# Patient Record
Sex: FEMALE | Race: WHITE | NOT HISPANIC OR LATINO | Employment: OTHER | RURAL
[De-identification: names, ages, dates, MRNs, and addresses within clinical notes are randomized per-mention and may not be internally consistent; named-entity substitution may affect disease eponyms.]

---

## 2017-10-04 LAB
CHOLEST SERPL-MSCNC: 252 MG/DL (ref 0–200)
HDLC SERPL-MCNC: 38 MG/DL (ref 35–70)
LDLC SERPL CALC-MCNC: ABNORMAL MG/DL
TRIGL SERPL-MCNC: 779 MG/DL (ref 40–160)

## 2018-02-20 LAB — % HEMOGLOBIN A1C: 8

## 2020-04-21 ENCOUNTER — HISTORICAL (OUTPATIENT)
Dept: ADMINISTRATIVE | Facility: HOSPITAL | Age: 65
End: 2020-04-21

## 2020-04-21 LAB
BASOPHILS # BLD AUTO: 0.05 X10E3/UL (ref 0–0.2)
BASOPHILS NFR BLD AUTO: 0.6 % (ref 0–1)
BILIRUB UR QL STRIP: NEGATIVE MG/DL
BUN SERPL-MCNC: 25 MG/DL (ref 7–18)
CALCIUM SERPL-MCNC: 9.3 MG/DL (ref 8.5–10.1)
CHLORIDE SERPL-SCNC: 102 MMOL/L (ref 98–107)
CLARITY UR: CLEAR
CO2 SERPL-SCNC: 30 MMOL/L (ref 21–32)
COLOR UR: COLORLESS
CREAT SERPL-MCNC: 1.72 MG/DL (ref 0.55–1.02)
EOSINOPHIL # BLD AUTO: 0.25 X10E3/UL (ref 0–0.5)
EOSINOPHIL NFR BLD AUTO: 2.8 % (ref 1–4)
ERYTHROCYTE [DISTWIDTH] IN BLOOD BY AUTOMATED COUNT: 15.5 % (ref 11.5–14.5)
GLUCOSE SERPL-MCNC: 147 MG/DL (ref 74–106)
GLUCOSE UR STRIP-MCNC: NEGATIVE MG/DL
HCT VFR BLD AUTO: 44.5 % (ref 38–47)
HGB BLD-MCNC: 14.2 G/DL (ref 12–16)
IMM GRANULOCYTES # BLD AUTO: 0.07 X10E3/UL (ref 0–0.04)
IMM GRANULOCYTES NFR BLD: 0.8 % (ref 0–0.4)
KETONES UR STRIP-SCNC: NEGATIVE MG/DL
LEUKOCYTE ESTERASE UR QL STRIP: NEGATIVE LEU/UL
LYMPHOCYTES # BLD AUTO: 1.59 X10E3/UL (ref 1–4.8)
LYMPHOCYTES NFR BLD AUTO: 17.6 % (ref 27–41)
MCH RBC QN AUTO: 29.9 PG (ref 27–31)
MCHC RBC AUTO-ENTMCNC: 31.9 G/DL (ref 32–36)
MCV RBC AUTO: 93.7 FL (ref 80–96)
MONOCYTES # BLD AUTO: 0.72 X10E3/UL (ref 0–0.8)
MONOCYTES NFR BLD AUTO: 8 % (ref 2–6)
MPC BLD CALC-MCNC: 10.1 FL (ref 9.4–12.4)
NEUTROPHILS # BLD AUTO: 6.36 X10E3/UL (ref 1.8–7.7)
NEUTROPHILS NFR BLD AUTO: 70.2 % (ref 53–65)
NITRITE UR QL STRIP: NEGATIVE
NRBC # BLD AUTO: 0 X10E3/UL (ref 0–0)
NRBC, AUTO (.00): 0 /100 (ref 0–0)
PH UR STRIP: 5.5 PH UNITS (ref 5–8)
PLATELET # BLD AUTO: 238 X10E3/UL (ref 150–400)
POTASSIUM SERPL-SCNC: 4 MMOL/L (ref 3.5–5.1)
PROLACTIN SERPL-MCNC: 12.5 NG/ML
PROT UR QL STRIP: NEGATIVE MG/DL
RBC # BLD AUTO: 4.75 X10E6/UL (ref 4.2–5.4)
RBC # UR STRIP: NEGATIVE ERY/UL
SODIUM SERPL-SCNC: 139 MMOL/L (ref 136–145)
SP GR UR STRIP: 1.01 (ref 1–1.03)
UROBILINOGEN UR STRIP-ACNC: 0.2 EU/DL
WBC # BLD AUTO: 9.04 X10E3/UL (ref 4.5–11)

## 2020-11-27 ENCOUNTER — HISTORICAL (OUTPATIENT)
Dept: ADMINISTRATIVE | Facility: HOSPITAL | Age: 65
End: 2020-11-27

## 2020-11-27 LAB
ALBUMIN SERPL BCP-MCNC: 3.4 G/DL (ref 3.4–5)
ALBUMIN/GLOB SERPL: 1 {RATIO}
ALP SERPL-CCNC: 72 U/L (ref 46–116)
ALT SERPL W P-5'-P-CCNC: 22 U/L (ref 14–63)
ANION GAP SERPL CALCULATED.3IONS-SCNC: 15 MMOL/L
ANISOCYTOSIS BLD QL SMEAR: ABNORMAL
APTT PPP: 36.4 SECONDS (ref 25.2–37.3)
AST SERPL W P-5'-P-CCNC: 15 U/L (ref 15–37)
BASOPHILS # BLD AUTO: 0.03 X10E3/UL (ref 0–0.2)
BASOPHILS NFR BLD AUTO: 0.2 % (ref 0–1)
BILIRUB SERPL-MCNC: 0.3 MG/DL (ref 0.2–1)
BUN SERPL-MCNC: 31 MG/DL (ref 7–17)
BUN/CREAT SERPL: 16.5
CALCIUM SERPL-MCNC: 9.5 MG/DL (ref 8.5–10.1)
CHLORIDE SERPL-SCNC: 101 MMOL/L (ref 98–107)
CO2 SERPL-SCNC: 25 MMOL/L (ref 21–32)
CREAT SERPL-MCNC: 1.88 MG/DL (ref 0.55–1.3)
D DIMER PPP FEU-MCNC: 0.33 UG/ML (ref 0–0.47)
EOSINOPHIL # BLD AUTO: 0.18 X10E3/UL (ref 0–0.5)
EOSINOPHIL NFR BLD AUTO: 1 % (ref 1–4)
EOSINOPHIL NFR BLD MANUAL: 2 % (ref 1–4)
ERYTHROCYTE [DISTWIDTH] IN BLOOD BY AUTOMATED COUNT: 16.2 % (ref 11.5–14.5)
FLUAV AG UPPER RESP QL IA.RAPID: NEGATIVE
FLUBV AG UPPER RESP QL IA.RAPID: NEGATIVE
GLOBULIN SER-MCNC: 3.5 G/DL
GLUCOSE SERPL-MCNC: 279 MG/DL (ref 74–106)
HCT VFR BLD AUTO: 42.9 % (ref 38–47)
HGB BLD-MCNC: 14.4 G/DL (ref 12–16)
INR BLD: 1 (ref 0–3.4)
LACTATE SERPL-SCNC: 2.89 MMOL/L (ref 0.4–2)
LYMPHOCYTES # BLD AUTO: 1.51 X10E3/UL (ref 1–4.8)
LYMPHOCYTES NFR BLD AUTO: 8.5 % (ref 27–41)
LYMPHOCYTES NFR BLD MANUAL: 9 % (ref 27–41)
MAGNESIUM SERPL-MCNC: 1.8 MG/DL (ref 1.8–2.4)
MCH RBC QN AUTO: 29.8 PG (ref 27–31)
MCHC RBC AUTO-ENTMCNC: 33.6 G/DL (ref 32–36)
MCV RBC AUTO: 89 FL (ref 80–96)
MONOCYTES # BLD AUTO: 1.12 X10E3/UL (ref 0–0.8)
MONOCYTES NFR BLD AUTO: 6.3 % (ref 2–6)
MONOCYTES NFR BLD MANUAL: 6 % (ref 2–6)
MPC BLD CALC-MCNC: 10.6 FL (ref 9.4–12.4)
NEUTROPHILS # BLD AUTO: 14.89 X10E3/UL (ref 1.8–7.7)
NEUTROPHILS NFR BLD AUTO: 84 % (ref 53–65)
NEUTS BAND NFR BLD MANUAL: 9 % (ref 1–5)
NEUTS SEG NFR BLD MANUAL: 74 % (ref 50–62)
NT-PROBNP SERPL-MCNC: 130 PG/ML (ref 5–125)
PLATELET # BLD AUTO: 193 X10E3/UL (ref 150–400)
PLATELET MORPHOLOGY: NORMAL
POTASSIUM SERPL-SCNC: 5.2 MMOL/L (ref 3.5–5.1)
PROT SERPL-MCNC: 6.9 G/DL (ref 6.4–8.2)
PROTHROMBIN TIME: 13.2 SECONDS (ref 11.7–14.7)
RBC # BLD AUTO: 4.83 X10E6/UL (ref 4.2–5.4)
RBC MORPH BLD: NORMAL
SARS-COV+SARS-COV-2 AG RESP QL IA.RAPID: NEGATIVE
SODIUM SERPL-SCNC: 136 MMOL/L (ref 136–145)
TROPONIN I SERPL-MCNC: <0.017 NG/ML (ref 0–0.06)
WBC # BLD AUTO: 17.73 X10E3/UL (ref 4.5–11)

## 2020-11-29 ENCOUNTER — HISTORICAL (OUTPATIENT)
Dept: ADMINISTRATIVE | Facility: HOSPITAL | Age: 65
End: 2020-11-29

## 2020-11-29 LAB
ANION GAP SERPL CALCULATED.3IONS-SCNC: 11 MMOL/L
BASOPHILS # BLD AUTO: 0.03 X10E3/UL (ref 0–0.2)
BASOPHILS NFR BLD AUTO: 0.3 % (ref 0–1)
BUN SERPL-MCNC: 29 MG/DL (ref 7–18)
CALCIUM SERPL-MCNC: 9.3 MG/DL (ref 8.5–10.1)
CHLORIDE SERPL-SCNC: 103 MMOL/L (ref 98–107)
CO2 SERPL-SCNC: 28 MMOL/L (ref 21–32)
CREAT SERPL-MCNC: 1.84 MG/DL (ref 0.55–1.02)
EOSINOPHIL # BLD AUTO: 0.31 X10E3/UL (ref 0–0.5)
EOSINOPHIL NFR BLD AUTO: 3.6 % (ref 1–4)
EOSINOPHIL NFR BLD MANUAL: 1 % (ref 1–4)
ERYTHROCYTE [DISTWIDTH] IN BLOOD BY AUTOMATED COUNT: 16.2 % (ref 11.5–14.5)
GLUCOSE SERPL-MCNC: 248 MG/DL (ref 74–106)
HCT VFR BLD AUTO: 39.5 % (ref 38–47)
HGB BLD-MCNC: 12.7 G/DL (ref 12–16)
LYMPHOCYTES # BLD AUTO: 2.18 X10E3/UL (ref 1–4.8)
LYMPHOCYTES NFR BLD AUTO: 25.4 % (ref 27–41)
LYMPHOCYTES NFR BLD MANUAL: 28 % (ref 27–41)
MCH RBC QN AUTO: 30.5 PG (ref 27–31)
MCHC RBC AUTO-ENTMCNC: 32.2 G/DL (ref 32–36)
MCV RBC AUTO: 95 FL (ref 80–96)
MONOCYTES # BLD AUTO: 0.7 X10E3/UL (ref 0–0.8)
MONOCYTES NFR BLD AUTO: 8.1 % (ref 2–6)
MONOCYTES NFR BLD MANUAL: 6 % (ref 2–6)
MPC BLD CALC-MCNC: 10.9 FL (ref 9.4–12.4)
NEUTROPHILS # BLD AUTO: 5.37 X10E3/UL (ref 1.8–7.7)
NEUTROPHILS NFR BLD AUTO: 62.6 % (ref 53–65)
NEUTS SEG NFR BLD MANUAL: 65 % (ref 50–62)
NT-PROBNP SERPL-MCNC: 254 PG/ML (ref 0–125)
PLATELET # BLD AUTO: 186 X10E3/UL (ref 150–400)
POTASSIUM SERPL-SCNC: 4.5 MMOL/L (ref 3.5–5.1)
RBC # BLD AUTO: 4.17 X10E6/UL (ref 4.2–5.4)
SODIUM SERPL-SCNC: 137 MMOL/L (ref 136–145)
WBC # BLD AUTO: 8.59 X10E3/UL (ref 4.5–11)

## 2020-11-30 ENCOUNTER — HISTORICAL (OUTPATIENT)
Dept: ADMINISTRATIVE | Facility: HOSPITAL | Age: 65
End: 2020-11-30

## 2020-11-30 LAB
ANION GAP SERPL CALCULATED.3IONS-SCNC: 12 MMOL/L
BUN SERPL-MCNC: 26 MG/DL (ref 7–18)
CALCIUM SERPL-MCNC: 9.4 MG/DL (ref 8.5–10.1)
CHLORIDE SERPL-SCNC: 103 MMOL/L (ref 98–107)
CO2 SERPL-SCNC: 27 MMOL/L (ref 21–32)
CREAT SERPL-MCNC: 1.58 MG/DL (ref 0.55–1.02)
GLUCOSE SERPL-MCNC: 189 MG/DL (ref 74–106)
POTASSIUM SERPL-SCNC: 4.8 MMOL/L (ref 3.5–5.1)
SARS-COV+SARS-COV-2 AG RESP QL IA.RAPID: NEGATIVE
SODIUM SERPL-SCNC: 137 MMOL/L (ref 136–145)

## 2020-12-01 ENCOUNTER — HISTORICAL (OUTPATIENT)
Dept: ADMINISTRATIVE | Facility: HOSPITAL | Age: 65
End: 2020-12-01

## 2020-12-01 LAB — GLUCOSE SERPL-MCNC: 222 MG/DL (ref 70–105)

## 2020-12-02 ENCOUNTER — HISTORICAL (OUTPATIENT)
Dept: ADMINISTRATIVE | Facility: HOSPITAL | Age: 65
End: 2020-12-02

## 2020-12-02 LAB
GLUCOSE SERPL-MCNC: 148 MG/DL (ref 70–105)
GLUCOSE SERPL-MCNC: 248 MG/DL (ref 70–105)
GLUCOSE SERPL-MCNC: 258 MG/DL (ref 70–105)

## 2020-12-03 LAB
REPORT: NORMAL

## 2021-01-19 ENCOUNTER — HISTORICAL (OUTPATIENT)
Dept: ADMINISTRATIVE | Facility: HOSPITAL | Age: 66
End: 2021-01-19

## 2021-01-19 LAB
25(OH)D3 SERPL-MCNC: 54.8 NG/ML
IRON SATN MFR SERPL: 15 % (ref 14–50)
IRON SERPL-MCNC: 53 UG/DL (ref 50–170)
TIBC SERPL-MCNC: 363 UG/DL (ref 250–450)

## 2021-05-27 ENCOUNTER — OFFICE VISIT (OUTPATIENT)
Dept: FAMILY MEDICINE | Facility: CLINIC | Age: 66
End: 2021-05-27
Payer: MEDICARE

## 2021-05-27 VITALS
BODY MASS INDEX: 34.96 KG/M2 | WEIGHT: 190 LBS | HEART RATE: 77 BPM | SYSTOLIC BLOOD PRESSURE: 116 MMHG | DIASTOLIC BLOOD PRESSURE: 70 MMHG | HEIGHT: 62 IN

## 2021-05-27 DIAGNOSIS — E11.9 TYPE 2 DIABETES MELLITUS WITHOUT COMPLICATION, WITHOUT LONG-TERM CURRENT USE OF INSULIN: Primary | ICD-10-CM

## 2021-05-27 DIAGNOSIS — G57.93 NEUROPATHY OF BOTH FEET: ICD-10-CM

## 2021-05-27 DIAGNOSIS — L84 CORNS AND CALLUS: ICD-10-CM

## 2021-05-27 DIAGNOSIS — E11.9 ENCOUNTER FOR DIABETIC FOOT EXAM: ICD-10-CM

## 2021-05-27 PROCEDURE — 99213 PR OFFICE/OUTPT VISIT, EST, LEVL III, 20-29 MIN: ICD-10-PCS | Mod: ,,, | Performed by: FAMILY MEDICINE

## 2021-05-27 PROCEDURE — 99213 OFFICE O/P EST LOW 20 MIN: CPT | Mod: ,,, | Performed by: FAMILY MEDICINE

## 2021-05-27 RX ORDER — PRAVASTATIN SODIUM 40 MG/1
40 TABLET ORAL NIGHTLY
COMMUNITY
Start: 2021-03-03 | End: 2021-07-01 | Stop reason: SDUPTHER

## 2021-05-27 RX ORDER — GLIPIZIDE 5 MG/1
5 TABLET ORAL DAILY
COMMUNITY
Start: 2021-03-27 | End: 2021-06-23 | Stop reason: SDUPTHER

## 2021-05-27 RX ORDER — ERGOCALCIFEROL 1.25 MG/1
5000 CAPSULE ORAL DAILY
COMMUNITY
Start: 2021-04-16

## 2021-05-27 RX ORDER — LISINOPRIL 20 MG/1
20 TABLET ORAL DAILY
COMMUNITY
Start: 2021-05-02 | End: 2021-07-01 | Stop reason: SDUPTHER

## 2021-05-27 RX ORDER — VALPROIC ACID 250 MG/1
250 CAPSULE, LIQUID FILLED ORAL 2 TIMES DAILY
COMMUNITY
Start: 2021-05-07 | End: 2021-07-01 | Stop reason: SDUPTHER

## 2021-05-27 RX ORDER — ESTRADIOL 1 MG/1
1 TABLET ORAL DAILY
COMMUNITY
Start: 2021-05-02 | End: 2021-07-01 | Stop reason: SDUPTHER

## 2021-05-27 RX ORDER — GABAPENTIN 100 MG/1
100 CAPSULE ORAL 2 TIMES DAILY
COMMUNITY
Start: 2021-05-07 | End: 2021-07-01 | Stop reason: SDUPTHER

## 2021-05-27 RX ORDER — LEVOTHYROXINE SODIUM 25 UG/1
25 TABLET ORAL DAILY
COMMUNITY
Start: 2021-05-02 | End: 2021-07-01 | Stop reason: SDUPTHER

## 2021-05-27 RX ORDER — PANTOPRAZOLE SODIUM 40 MG/1
40 TABLET, DELAYED RELEASE ORAL DAILY
COMMUNITY
Start: 2021-03-03 | End: 2021-07-01 | Stop reason: SDUPTHER

## 2021-06-24 RX ORDER — GLIPIZIDE 5 MG/1
5 TABLET ORAL DAILY
Qty: 90 TABLET | Refills: 1 | Status: SHIPPED | OUTPATIENT
Start: 2021-06-24 | End: 2021-10-01 | Stop reason: SDUPTHER

## 2021-07-01 ENCOUNTER — OFFICE VISIT (OUTPATIENT)
Dept: FAMILY MEDICINE | Facility: CLINIC | Age: 66
End: 2021-07-01
Payer: COMMERCIAL

## 2021-07-01 VITALS
OXYGEN SATURATION: 97 % | BODY MASS INDEX: 36.07 KG/M2 | HEIGHT: 62 IN | WEIGHT: 196 LBS | RESPIRATION RATE: 16 BRPM | DIASTOLIC BLOOD PRESSURE: 80 MMHG | SYSTOLIC BLOOD PRESSURE: 132 MMHG | HEART RATE: 64 BPM

## 2021-07-01 DIAGNOSIS — Z12.31 ENCOUNTER FOR SCREENING MAMMOGRAM FOR MALIGNANT NEOPLASM OF BREAST: ICD-10-CM

## 2021-07-01 DIAGNOSIS — I10 ESSENTIAL HYPERTENSION: ICD-10-CM

## 2021-07-01 DIAGNOSIS — E11.40 TYPE 2 DIABETES MELLITUS WITH DIABETIC NEUROPATHY, WITHOUT LONG-TERM CURRENT USE OF INSULIN: Primary | ICD-10-CM

## 2021-07-01 DIAGNOSIS — E89.40 ASYMPTOMATIC POSTSURGICAL MENOPAUSE: ICD-10-CM

## 2021-07-01 PROCEDURE — 1036F PR CURRENT TOBACCO NON-USER: ICD-10-PCS | Mod: ,,, | Performed by: FAMILY MEDICINE

## 2021-07-01 PROCEDURE — G0439 PR MEDICARE ANNUAL WELLNESS SUBSEQUENT VISIT: ICD-10-PCS | Mod: ,,, | Performed by: FAMILY MEDICINE

## 2021-07-01 PROCEDURE — 1036F TOBACCO NON-USER: CPT | Mod: ,,, | Performed by: FAMILY MEDICINE

## 2021-07-01 PROCEDURE — G0444 DEPRESSION SCREEN ANNUAL: HCPCS | Mod: ,,, | Performed by: FAMILY MEDICINE

## 2021-07-01 PROCEDURE — G0444 PR DEPRESSION SCREENING: ICD-10-PCS | Mod: ,,, | Performed by: FAMILY MEDICINE

## 2021-07-01 PROCEDURE — G0439 PPPS, SUBSEQ VISIT: HCPCS | Mod: ,,, | Performed by: FAMILY MEDICINE

## 2021-07-01 RX ORDER — PRAVASTATIN SODIUM 40 MG/1
40 TABLET ORAL NIGHTLY
Qty: 90 TABLET | Refills: 2 | Status: SHIPPED | OUTPATIENT
Start: 2021-07-01 | End: 2021-12-01 | Stop reason: SDUPTHER

## 2021-07-01 RX ORDER — LISINOPRIL 20 MG/1
20 TABLET ORAL DAILY
Qty: 90 TABLET | Refills: 2 | Status: SHIPPED | OUTPATIENT
Start: 2021-07-01 | End: 2021-10-28 | Stop reason: SDUPTHER

## 2021-07-01 RX ORDER — PANTOPRAZOLE SODIUM 40 MG/1
40 TABLET, DELAYED RELEASE ORAL DAILY
Qty: 90 TABLET | Refills: 2 | Status: SHIPPED | OUTPATIENT
Start: 2021-07-01 | End: 2021-10-28 | Stop reason: SDUPTHER

## 2021-07-01 RX ORDER — GABAPENTIN 100 MG/1
100 CAPSULE ORAL 2 TIMES DAILY
Qty: 180 CAPSULE | Refills: 2 | Status: SHIPPED | OUTPATIENT
Start: 2021-07-01 | End: 2021-10-01 | Stop reason: SDUPTHER

## 2021-07-01 RX ORDER — VALPROIC ACID 250 MG/1
250 CAPSULE, LIQUID FILLED ORAL 2 TIMES DAILY
Qty: 180 CAPSULE | Refills: 2 | Status: SHIPPED | OUTPATIENT
Start: 2021-07-01 | End: 2021-09-15 | Stop reason: SDUPTHER

## 2021-07-01 RX ORDER — LEVOTHYROXINE SODIUM 25 UG/1
25 TABLET ORAL DAILY
Qty: 90 TABLET | Refills: 2 | Status: SHIPPED | OUTPATIENT
Start: 2021-07-01 | End: 2021-10-28 | Stop reason: SDUPTHER

## 2021-07-01 RX ORDER — ESTRADIOL 1 MG/1
1 TABLET ORAL DAILY
Qty: 90 TABLET | Refills: 2 | Status: SHIPPED | OUTPATIENT
Start: 2021-07-01 | End: 2021-10-28 | Stop reason: SDUPTHER

## 2021-07-12 ENCOUNTER — HOSPITAL ENCOUNTER (OUTPATIENT)
Dept: RADIOLOGY | Facility: HOSPITAL | Age: 66
Discharge: HOME | End: 2021-07-12
Attending: FAMILY MEDICINE
Payer: MEDICARE

## 2021-07-12 ENCOUNTER — HOSPITAL ENCOUNTER (OUTPATIENT)
Dept: RADIOLOGY | Facility: HOSPITAL | Age: 66
Discharge: HOME | End: 2021-07-12
Attending: FAMILY MEDICINE
Payer: COMMERCIAL

## 2021-07-12 VITALS — HEIGHT: 62 IN | BODY MASS INDEX: 36.07 KG/M2 | WEIGHT: 196 LBS

## 2021-07-12 DIAGNOSIS — Z12.31 BREAST CANCER SCREENING BY MAMMOGRAM: ICD-10-CM

## 2021-07-12 DIAGNOSIS — E89.40 ASYMPTOMATIC POSTSURGICAL MENOPAUSE: ICD-10-CM

## 2021-07-12 PROCEDURE — 77080 DXA BONE DENSITY AXIAL: CPT | Mod: 26,,, | Performed by: RADIOLOGY

## 2021-07-12 PROCEDURE — 77080 DEXA BONE DENSITY SPINE HIP: ICD-10-PCS | Mod: 26,,, | Performed by: RADIOLOGY

## 2021-07-12 PROCEDURE — 77067 SCR MAMMO BI INCL CAD: CPT | Mod: TC

## 2021-07-12 PROCEDURE — 77080 DXA BONE DENSITY AXIAL: CPT | Mod: TC

## 2021-07-15 ENCOUNTER — OFFICE VISIT (OUTPATIENT)
Dept: FAMILY MEDICINE | Facility: CLINIC | Age: 66
End: 2021-07-15
Payer: COMMERCIAL

## 2021-07-15 VITALS
HEIGHT: 62 IN | HEART RATE: 74 BPM | BODY MASS INDEX: 35.7 KG/M2 | SYSTOLIC BLOOD PRESSURE: 132 MMHG | WEIGHT: 194 LBS | DIASTOLIC BLOOD PRESSURE: 73 MMHG

## 2021-07-15 DIAGNOSIS — S30.861A TICK BITE OF ABDOMEN, INITIAL ENCOUNTER: ICD-10-CM

## 2021-07-15 DIAGNOSIS — M72.2 PLANTAR FASCIITIS OF RIGHT FOOT: Primary | ICD-10-CM

## 2021-07-15 DIAGNOSIS — M79.671 FOOT PAIN, RIGHT: ICD-10-CM
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
## 2021-07-15 PROCEDURE — 99213 OFFICE O/P EST LOW 20 MIN: CPT | Mod: ,,, | Performed by: FAMILY MEDICINE

## 2021-07-15 PROCEDURE — 99213 PR OFFICE/OUTPT VISIT, EST, LEVL III, 20-29 MIN: ICD-10-PCS | Mod: ,,, | Performed by: FAMILY MEDICINE

## 2021-07-15 RX ORDER — NAPROXEN 500 MG/1
500 TABLET ORAL 2 TIMES DAILY WITH MEALS
Qty: 14 TABLET | Refills: 0 | Status: SHIPPED | OUTPATIENT
Start: 2021-07-15 | End: 2021-12-01 | Stop reason: ALTCHOICE

## 2021-09-14 ENCOUNTER — PATIENT OUTREACH (OUTPATIENT)
Dept: FAMILY MEDICINE | Facility: CLINIC | Age: 66
End: 2021-09-14

## 2021-09-15 RX ORDER — VALPROIC ACID 250 MG/1
250 CAPSULE, LIQUID FILLED ORAL 2 TIMES DAILY
Qty: 180 CAPSULE | Refills: 2 | Status: SHIPPED | OUTPATIENT
Start: 2021-09-15 | End: 2022-09-08 | Stop reason: SDUPTHER

## 2021-09-23 ENCOUNTER — IMMUNIZATION (OUTPATIENT)
Dept: FAMILY MEDICINE | Facility: CLINIC | Age: 66
End: 2021-09-23
Payer: COMMERCIAL

## 2021-09-23 DIAGNOSIS — I10 ESSENTIAL HYPERTENSION: ICD-10-CM

## 2021-09-23 DIAGNOSIS — E11.40 TYPE 2 DIABETES MELLITUS WITH DIABETIC NEUROPATHY, WITHOUT LONG-TERM CURRENT USE OF INSULIN: Primary | ICD-10-CM

## 2021-09-23 DIAGNOSIS — Z79.899 LONG-TERM USE OF HIGH-RISK MEDICATION: ICD-10-CM

## 2021-09-23 DIAGNOSIS — Z23 NEED FOR VACCINATION: ICD-10-CM

## 2021-09-23 DIAGNOSIS — E55.9 VITAMIN D DEFICIENCY: ICD-10-CM

## 2021-09-23 DIAGNOSIS — Z13.220 SCREENING FOR LIPOID DISORDERS: ICD-10-CM

## 2021-09-23 PROCEDURE — 91301 COVID-19, MRNA, LNP-S, PF, 100 MCG/0.5 ML DOSE VACCINE: ICD-10-PCS | Mod: ,,, | Performed by: NURSE PRACTITIONER

## 2021-09-23 PROCEDURE — 0011A COVID-19, MRNA, LNP-S, PF, 100 MCG/0.5 ML DOSE VACCINE: CPT | Mod: CV19,,, | Performed by: NURSE PRACTITIONER

## 2021-09-23 PROCEDURE — 91301 COVID-19, MRNA, LNP-S, PF, 100 MCG/0.5 ML DOSE VACCINE: CPT | Mod: ,,, | Performed by: NURSE PRACTITIONER

## 2021-09-23 PROCEDURE — 0011A COVID-19, MRNA, LNP-S, PF, 100 MCG/0.5 ML DOSE VACCINE: ICD-10-PCS | Mod: CV19,,, | Performed by: NURSE PRACTITIONER

## 2021-10-28 RX ORDER — PANTOPRAZOLE SODIUM 40 MG/1
40 TABLET, DELAYED RELEASE ORAL DAILY
Qty: 90 TABLET | Refills: 2 | Status: SHIPPED | OUTPATIENT
Start: 2021-10-28 | End: 2022-07-21

## 2021-10-28 RX ORDER — LISINOPRIL 20 MG/1
20 TABLET ORAL DAILY
Qty: 90 TABLET | Refills: 2 | Status: SHIPPED | OUTPATIENT
Start: 2021-10-28 | End: 2022-07-21

## 2021-10-28 RX ORDER — LEVOTHYROXINE SODIUM 25 UG/1
25 TABLET ORAL DAILY
Qty: 90 TABLET | Refills: 2 | Status: SHIPPED | OUTPATIENT
Start: 2021-10-28 | End: 2022-07-21

## 2021-10-28 RX ORDER — ESTRADIOL 1 MG/1
1 TABLET ORAL DAILY
Qty: 90 TABLET | Refills: 2 | Status: SHIPPED | OUTPATIENT
Start: 2021-10-28 | End: 2022-07-21

## 2021-12-01 ENCOUNTER — OFFICE VISIT (OUTPATIENT)
Dept: FAMILY MEDICINE | Facility: CLINIC | Age: 66
End: 2021-12-01
Payer: MEDICARE

## 2021-12-01 VITALS
SYSTOLIC BLOOD PRESSURE: 133 MMHG | HEART RATE: 77 BPM | HEIGHT: 62 IN | BODY MASS INDEX: 35.7 KG/M2 | WEIGHT: 194 LBS | DIASTOLIC BLOOD PRESSURE: 76 MMHG

## 2021-12-01 DIAGNOSIS — M25.562 CHRONIC PAIN OF BOTH KNEES: Chronic | ICD-10-CM

## 2021-12-01 DIAGNOSIS — G89.29 CHRONIC PAIN OF BOTH KNEES: Chronic | ICD-10-CM

## 2021-12-01 DIAGNOSIS — M25.561 CHRONIC PAIN OF BOTH KNEES: Chronic | ICD-10-CM

## 2021-12-01 DIAGNOSIS — E78.2 MIXED HYPERLIPIDEMIA: Chronic | ICD-10-CM

## 2021-12-01 DIAGNOSIS — E11.40 TYPE 2 DIABETES MELLITUS WITH DIABETIC NEUROPATHY, WITHOUT LONG-TERM CURRENT USE OF INSULIN: Primary | Chronic | ICD-10-CM

## 2021-12-01 PROCEDURE — 99214 OFFICE O/P EST MOD 30 MIN: CPT | Mod: ,,, | Performed by: FAMILY MEDICINE

## 2021-12-01 PROCEDURE — 99214 PR OFFICE/OUTPT VISIT, EST, LEVL IV, 30-39 MIN: ICD-10-PCS | Mod: ,,, | Performed by: FAMILY MEDICINE

## 2021-12-01 RX ORDER — MELOXICAM 7.5 MG/1
7.5 TABLET ORAL DAILY
Qty: 90 TABLET | Refills: 0 | Status: SHIPPED | OUTPATIENT
Start: 2021-12-01 | End: 2022-02-18 | Stop reason: SDUPTHER

## 2021-12-01 RX ORDER — PRAVASTATIN SODIUM 40 MG/1
40 TABLET ORAL NIGHTLY
Qty: 90 TABLET | Refills: 2 | Status: SHIPPED | OUTPATIENT
Start: 2021-12-01 | End: 2022-09-08 | Stop reason: SDUPTHER

## 2021-12-01 RX ORDER — GLIPIZIDE 5 MG/1
5 TABLET ORAL 2 TIMES DAILY WITH MEALS
Qty: 180 TABLET | Refills: 2 | Status: SHIPPED | OUTPATIENT
Start: 2021-12-01 | End: 2022-07-26 | Stop reason: SDUPTHER

## 2022-02-18 ENCOUNTER — OFFICE VISIT (OUTPATIENT)
Dept: FAMILY MEDICINE | Facility: CLINIC | Age: 67
End: 2022-02-18
Payer: MEDICARE

## 2022-02-18 VITALS
HEART RATE: 85 BPM | DIASTOLIC BLOOD PRESSURE: 76 MMHG | WEIGHT: 194 LBS | SYSTOLIC BLOOD PRESSURE: 140 MMHG | HEIGHT: 62 IN | BODY MASS INDEX: 35.7 KG/M2

## 2022-02-18 DIAGNOSIS — E11.40 TYPE 2 DIABETES MELLITUS WITH DIABETIC NEUROPATHY, WITHOUT LONG-TERM CURRENT USE OF INSULIN: Chronic | ICD-10-CM

## 2022-02-18 DIAGNOSIS — G89.29 CHRONIC LOW BACK PAIN, UNSPECIFIED BACK PAIN LATERALITY, UNSPECIFIED WHETHER SCIATICA PRESENT: Chronic | ICD-10-CM

## 2022-02-18 DIAGNOSIS — M54.50 CHRONIC LOW BACK PAIN, UNSPECIFIED BACK PAIN LATERALITY, UNSPECIFIED WHETHER SCIATICA PRESENT: Chronic | ICD-10-CM

## 2022-02-18 DIAGNOSIS — G89.29 CHRONIC PAIN OF BOTH KNEES: Chronic | ICD-10-CM

## 2022-02-18 DIAGNOSIS — G57.93 NEUROPATHY OF BOTH FEET: Primary | Chronic | ICD-10-CM

## 2022-02-18 DIAGNOSIS — M25.561 CHRONIC PAIN OF BOTH KNEES: Chronic | ICD-10-CM

## 2022-02-18 DIAGNOSIS — M25.562 CHRONIC PAIN OF BOTH KNEES: Chronic | ICD-10-CM

## 2022-02-18 PROCEDURE — 99214 PR OFFICE/OUTPT VISIT, EST, LEVL IV, 30-39 MIN: ICD-10-PCS | Mod: ,,, | Performed by: FAMILY MEDICINE

## 2022-02-18 PROCEDURE — 99214 OFFICE O/P EST MOD 30 MIN: CPT | Mod: ,,, | Performed by: FAMILY MEDICINE

## 2022-02-18 RX ORDER — MELOXICAM 7.5 MG/1
7.5 TABLET ORAL DAILY
Qty: 90 TABLET | Refills: 2 | Status: SHIPPED | OUTPATIENT
Start: 2022-02-18 | End: 2022-09-09

## 2022-02-18 NOTE — PROGRESS NOTES
Robert Gallegos MD   [facility]  MEDICAL GROUP [facility] FAM[facility]  [address] [place] [address]  [phone]      PATIENT NAME: Rosalva Lopez  : 1955  DATE: 22  MRN: [id number]      Billing Provider: Robert Gallegos MD  Level of Service:   Patient PCP Information       Provider PCP Type    Robert Gallegos MD General            Reason for Visit / Chief Complaint: Leg Pain       Update PCP  Update Chief Complaint         History of Present Illness / Problem Focused Workflow     Rosalva Lopez presents to the clinic with Leg Pain       Neuropathy has been bothering her more lately.  Hurts most when trying to sleep.  Is taking neurontin 100 mg BID    Has also been having some LBP and hip pain.  Takes mobic 7.5 mg (needs refilled) and also tylenol prn    Says that she has been focusing on diabetic diet and glucose has been a lot better controlled recently      Review of Systems     Review of Systems   Constitutional: Negative for activity change, chills and fever.   HENT: Negative for sore throat.    Respiratory: Negative for cough, chest tightness and shortness of breath.    Cardiovascular: Negative for chest pain and palpitations.   Gastrointestinal: Negative for abdominal pain.   Musculoskeletal: Positive for arthralgias and back pain.   Neurological: Positive for numbness. Negative for dizziness, syncope and weakness.   Psychiatric/Behavioral: Negative for confusion.        Medical / Social / Family History     Past Medical History:   Diagnosis Date    Depressive disorder     Diabetes     GERD (gastroesophageal reflux disease)     Hypothyroidism     Trigeminal neuralgia of left side of face        Past Surgical History:   Procedure Laterality Date    BREAST BIOPSY      CARPAL TUNNEL RELEASE Left     CHOLECYSTECTOMY      HYSTERECTOMY      INGUINAL HERNIA REPAIR      TONSILLECTOMY      TYMPANOPLASTY         Social History    reports that  she has never smoked. She has never used smokeless tobacco. She reports that she does not drink alcohol and does not use drugs.   Social History     Tobacco Use    Smoking status: Never Smoker    Smokeless tobacco: Never Used   Substance Use Topics    Alcohol use: Never    Drug use: Never       Family History  Family History   Problem Relation Age of Onset    Heart disease Mother     Hypertension Father     Diabetes Brother     Breast cancer Maternal Grandmother        Medications and Allergies     Medications  No outpatient medications have been marked as taking for the 2/18/22 encounter (Office Visit) with Robert Gallegos MD.       Allergies  Review of patient's allergies indicates:   Allergen Reactions    Fluzone [id number] tri-whole     Sulfa (sulfonamide antibiotics)     Tamiflu [oseltamivir]        Physical Examination     Vitals:    02/18/22 1456   BP: (!) 140/76   Pulse: 85     Physical Exam  Vitals reviewed.   Constitutional:       Appearance: Normal appearance.   HENT:      Head: Normocephalic and atraumatic.   Eyes:      Extraocular Movements: Extraocular movements intact.      Conjunctiva/sclera: Conjunctivae normal.      Pupils: Pupils are equal, round, and reactive to light.   Cardiovascular:      Rate and Rhythm: Normal rate and regular rhythm.      Heart sounds: Normal heart sounds.   Pulmonary:      Effort: Pulmonary effort is normal.      Breath sounds: Normal breath sounds.   Musculoskeletal:         General: Normal range of motion.      Cervical back: Normal range of motion.   Skin:     General: Skin is warm and dry.   Neurological:      General: No focal deficit present.      Mental Status: She is alert and oriented to person, place, and time.   Psychiatric:         Mood and Affect: Mood normal.         Behavior: Behavior normal.          Assessment and Plan (including Health Maintenance)      Problem List  Smart Sets  Document Outside HM   :    Plan: increase gabapentin to 200 mg  BID to see if neuropathy improves  Refill mobic        Health Maintenance Due   Topic Date Due    Hepatitis C Screening  Never done    Diabetes Urine Screening  Never done    Foot Exam  Never done    TETANUS VACCINE  Never done    Colorectal Cancer Screening  Never done    Shingles Vaccine (1 of 2) Never done    Influenza Vaccine (1) Never done    Eye Exam  12/04/2021       Problem List Items Addressed This Visit        Neuro    Neuropathy of both feet - Primary (Chronic)       Endocrine    Type 2 diabetes mellitus with diabetic neuropathy, without long-term current use of insulin (Chronic)       Orthopedic    Chronic low back pain (Chronic)          Health Maintenance Topics with due status: Not Due       Topic Last Completion Date    Mammogram 07/12/2021    DEXA SCAN 07/12/2021    Lipid Panel 09/23/2021    Hemoglobin A1c 09/23/2021    Low Dose Statin 12/01/2021       Future Appointments   Date Time Provider Department Center   7/7/2022 10:00 AM [facility] NURSE, [facility]   7/18/2022  8:15 AM [facility]1 R[facility]            Signature:  MD TONY Murcia [facility]  [facility] [place] - [facility]  [address] [place] [address] [place] [address]  [phone]    Date of encounter: 2/18/22

## 2022-03-11 DIAGNOSIS — Z71.89 COMPLEX CARE COORDINATION: ICD-10-CM

## 2022-03-29 ENCOUNTER — OFFICE VISIT (OUTPATIENT)
Dept: FAMILY MEDICINE | Facility: CLINIC | Age: 67
End: 2022-03-29
Payer: MEDICARE

## 2022-03-29 VITALS — DIASTOLIC BLOOD PRESSURE: 68 MMHG | HEART RATE: 82 BPM | SYSTOLIC BLOOD PRESSURE: 130 MMHG

## 2022-03-29 DIAGNOSIS — E11.9 ENCOUNTER FOR DIABETIC FOOT EXAM: ICD-10-CM

## 2022-03-29 DIAGNOSIS — E11.40 TYPE 2 DIABETES MELLITUS WITH DIABETIC NEUROPATHY, WITHOUT LONG-TERM CURRENT USE OF INSULIN: Primary | Chronic | ICD-10-CM

## 2022-03-29 DIAGNOSIS — G57.93 NEUROPATHY OF BOTH FEET: Chronic | ICD-10-CM

## 2022-03-29 DIAGNOSIS — F33.41 RECURRENT MAJOR DEPRESSIVE DISORDER, IN PARTIAL REMISSION: Chronic | ICD-10-CM

## 2022-03-29 PROCEDURE — 99214 OFFICE O/P EST MOD 30 MIN: CPT | Mod: ,,, | Performed by: FAMILY MEDICINE

## 2022-03-29 PROCEDURE — 99214 PR OFFICE/OUTPT VISIT, EST, LEVL IV, 30-39 MIN: ICD-10-PCS | Mod: ,,, | Performed by: FAMILY MEDICINE

## 2022-03-29 RX ORDER — GABAPENTIN 100 MG/1
CAPSULE ORAL
Qty: 360 CAPSULE | Refills: 1 | Status: SHIPPED | OUTPATIENT
Start: 2022-03-29 | End: 2022-12-13 | Stop reason: SDUPTHER

## 2022-03-29 RX ORDER — TRAZODONE HYDROCHLORIDE 50 MG/1
50 TABLET ORAL DAILY
Qty: 90 TABLET | Refills: 1 | Status: SHIPPED | OUTPATIENT
Start: 2022-03-29 | End: 2022-10-07 | Stop reason: SDUPTHER

## 2022-03-29 NOTE — PROGRESS NOTES
Robert Gallegos MD   [facility]  MEDICAL GROUP [facility] FAM[facility] MEDIC[facility]  [address] [place] [id number]  [phone]      PATIENT NAME: Rosalva Lopez  : 1955  DATE: 3/29/22  MRN: [id number]      Billing Provider: Robert Gallegos MD  Level of Service:   Patient PCP Information       Provider PCP Type    Robert Gallegos MD General            Reason for Visit / Chief Complaint: Follow-up (Medication refills) and Other (DIABETIC FOOT EXAM)       Update PCP  Update Chief Complaint         History of Present Illness / Problem Focused Workflow     Rosalva Lopez presents to the clinic with Follow-up (Medication refills) and Other (DIABETIC FOOT EXAM)       Follow up DM c neuropathy.  Needs diabetic foot exam.  Needs medications refilled.  No new/acute complaints.    Follow-up  Associated symptoms include numbness (feet). Pertinent negatives include no abdominal pain, chest pain, chills, coughing, fever, sore throat or weakness.       Review of Systems     Review of Systems   Constitutional: Negative for activity change, chills and fever.   HENT: Negative for sore throat.    Eyes: Negative for pain.   Respiratory: Negative for cough, chest tightness and shortness of breath.    Cardiovascular: Negative for chest pain and palpitations.   Gastrointestinal: Negative for abdominal pain.   Neurological: Positive for numbness (feet). Negative for dizziness, syncope and weakness.   Psychiatric/Behavioral: Negative for confusion.        Medical / Social / Family History     Past Medical History:   Diagnosis Date    Depressive disorder     Diabetes     GERD (gastroesophageal reflux disease)     Hypothyroidism     Trigeminal neuralgia of left side of face        Past Surgical History:   Procedure Laterality Date    BREAST BIOPSY      CARPAL TUNNEL RELEASE Left     CHOLECYSTECTOMY      HYSTERECTOMY      INGUINAL HERNIA REPAIR      TONSILLECTOMY      TYMPANOPLASTY          Social History    reports that she has never smoked. She has never used smokeless tobacco. She reports that she does not drink alcohol and does not use drugs.   Social History     Tobacco Use    Smoking status: Never Smoker    Smokeless tobacco: Never Used   Substance Use Topics    Alcohol use: Never    Drug use: Never       Family History  Family History   Problem Relation Age of Onset    Heart disease Mother     Hypertension Father     Diabetes Brother     Breast cancer Maternal Grandmother        Medications and Allergies     Medications  Outpatient Medications Marked as Taking for the 3/29/22 encounter (Office Visit) with Robert Gallegos MD   Medication Sig Dispense Refill    ergocalciferol (ERGOCALCIFEROL) 50,000 unit Cap TAKE 1 CAPSULE BY MOUTH ON THE 1ST AND THE 15TH OF THE MONTH FOR 3 MONTHS      estradioL (ESTRACE) 1 MG tablet Take 1 tablet (1 mg total) by mouth once daily. 90 tablet 2    gabapentin (NEURONTIN) 100 MG capsule Take 1 capsule (100 mg total) by mouth 2 (two) times daily. 180 capsule 2    glipiZIDE (GLUCOTROL) 5 MG tablet Take 1 tablet (5 mg total) by mouth 2 (two) times daily with meals. 180 tablet 2    levothyroxine (SYNTHROID) 25 MCG tablet Take 1 tablet (25 mcg total) by mouth once daily. 90 tablet 2    lisinopriL (PRINIVIL,ZESTRIL) 20 MG tablet Take 1 tablet (20 mg total) by mouth once daily. 90 tablet 2    meloxicam (MOBIC) 7.5 MG tablet Take 1 tablet (7.5 mg total) by mouth once daily. May increase to twice daily during pain flare 90 tablet 2    pantoprazole (PROTONIX) 40 MG tablet Take 1 tablet (40 mg total) by mouth once daily. 90 tablet 2    pravastatin (PRAVACHOL) 40 MG tablet Take 1 tablet (40 mg total) by mouth every evening. 90 tablet 2    traZODone (DESYREL) 50 MG tablet Take 1 tablet (50 mg total) by mouth once daily. 90 tablet 1    valproic acid (DEPAKENE) 250 mg capsule Take 1 capsule (250 mg total) by mouth 2 (two) times daily. 180 capsule 2        Allergies  Review of patient's allergies indicates:   Allergen Reactions    Fluzone [id number] tri-whole     Sulfa (sulfonamide antibiotics)     Tamiflu [oseltamivir]        Physical Examination     Vitals:    03/29/22 0920   BP: 130/68   Pulse: 82     Physical Exam  Vitals reviewed.   Constitutional:       Appearance: Normal appearance.   HENT:      Head: Normocephalic and atraumatic.   Eyes:      Extraocular Movements: Extraocular movements intact.      Conjunctiva/sclera: Conjunctivae normal.      Pupils: Pupils are equal, round, and reactive to light.   Cardiovascular:      Rate and Rhythm: Normal rate and regular rhythm.      Heart sounds: Normal heart sounds.   Pulmonary:      Effort: Pulmonary effort is normal.      Breath sounds: Normal breath sounds.   Musculoskeletal:         General: Normal range of motion.      Cervical back: Normal range of motion.   Feet:      Comments: Patient has sensation throughout feet; all digits intact, no ulceration present  Skin:     General: Skin is warm and dry.   Neurological:      General: No focal deficit present.      Mental Status: She is alert and oriented to person, place, and time.   Psychiatric:         Mood and Affect: Mood normal.         Behavior: Behavior normal.          Assessment and Plan (including Health Maintenance)      Problem List  Smart Sets  Document Outside HM   :    Plan: recommend diabetic shoes with custom inserts        Health Maintenance Due   Topic Date Due    Hepatitis C Screening  Never done    Diabetes Urine Screening  Never done    Foot Exam  Never done    TETANUS VACCINE  Never done    Colorectal Cancer Screening  Never done    Shingles Vaccine (1 of 2) Never done    Influenza Vaccine (1) Never done    Eye Exam  12/04/2021    Hemoglobin A1c  12/23/2021       Problem List Items Addressed This Visit        Neuro    Neuropathy of both feet - Primary (Chronic)       Psychiatric    Recurrent major depressive disorder, in  partial remission       Endocrine    Type 2 diabetes mellitus with diabetic neuropathy, without long-term current use of insulin (Chronic)      Other Visit Diagnoses     Encounter for diabetic foot exam              Health Maintenance Topics with due status: Not Due       Topic Last Completion Date    Mammogram 07/12/2021    DEXA Scan 07/12/2021    Lipid Panel 09/23/2021    Low Dose Statin 12/01/2021       Future Appointments   Date Time Provider Department Center   7/7/2022 10:00 AM AW[facility] NURSE, [facility] FAMILY MEDIC[facility]   7/18/2022  8:15 AM [facility]            Signature:  Robert Gallegos MD  [facility]  MEDICAL GROUP [facility] - FAMILY MEDICINE  [address] [place] [address]  [phone]    Date of encounter: 3/29/22

## 2022-04-14 ENCOUNTER — OFFICE VISIT (OUTPATIENT)
Dept: FAMILY MEDICINE | Facility: CLINIC | Age: 67
End: 2022-04-14
Payer: MEDICARE

## 2022-04-14 VITALS
SYSTOLIC BLOOD PRESSURE: 135 MMHG | BODY MASS INDEX: 35.51 KG/M2 | DIASTOLIC BLOOD PRESSURE: 76 MMHG | HEART RATE: 94 BPM | TEMPERATURE: 97 F | OXYGEN SATURATION: 95 % | HEIGHT: 62 IN | WEIGHT: 193 LBS

## 2022-04-14 DIAGNOSIS — T63.421A FIRE ANT BITE, ACCIDENTAL OR UNINTENTIONAL, INITIAL ENCOUNTER: ICD-10-CM

## 2022-04-14 DIAGNOSIS — T63.481A INSECT STINGS, ACCIDENTAL OR UNINTENTIONAL, INITIAL ENCOUNTER: Primary | ICD-10-CM

## 2022-04-14 PROCEDURE — 99213 PR OFFICE/OUTPT VISIT, EST, LEVL III, 20-29 MIN: ICD-10-PCS | Mod: ,,, | Performed by: NURSE PRACTITIONER

## 2022-04-14 PROCEDURE — 99213 OFFICE O/P EST LOW 20 MIN: CPT | Mod: ,,, | Performed by: NURSE PRACTITIONER

## 2022-04-14 RX ORDER — ASPIRIN 81 MG/1
81 TABLET ORAL DAILY
COMMUNITY

## 2022-04-14 RX ORDER — MUPIROCIN 20 MG/G
OINTMENT TOPICAL 2 TIMES DAILY
Qty: 15 G | Refills: 0 | Status: SHIPPED | OUTPATIENT
Start: 2022-04-14 | End: 2022-04-19

## 2022-04-14 NOTE — PROGRESS NOTES
Clinic note     Patient name: Rosalva Lopez is a 67 y.o. female   Chief compliant   Chief Complaint   Patient presents with    Insect Bite     States she found it about 10-15 mins about on right forearm on top just below the elbow. C/o of burning.  Denies any allergies to insects. Denies any difficulty breathing.        Subjective     History of present illness   PCP: Dr Gallegos   In clinic for evaluation of insect sting to right forearm which occurred about one hour ago, she is unsure of type of insect   She also has fire ant bites on left ankle and foot   Hx of DM, reports bg was 173 the last time she checked it a couple days ago   Last a1c was 10.8  She states she takes allegra daily for seasonal allergies         Social History     Tobacco Use    Smoking status: Never Smoker    Smokeless tobacco: Never Used   Substance Use Topics    Alcohol use: Never    Drug use: Never       Review of patient's allergies indicates:   Allergen Reactions    Fluzone [id number] tri-whole     Sulfa (sulfonamide antibiotics)     Tamiflu [oseltamivir]        Past Medical History:   Diagnosis Date    Depressive disorder     Diabetes     GERD (gastroesophageal reflux disease)     Hypothyroidism     Trigeminal neuralgia of left side of face        Past Surgical History:   Procedure Laterality Date    BREAST BIOPSY      CARPAL TUNNEL RELEASE Left     CHOLECYSTECTOMY      HYSTERECTOMY      INGUINAL HERNIA REPAIR      TONSILLECTOMY      TYMPANOPLASTY          Family History   Problem Relation Age of Onset    Heart disease Mother     Hypertension Father     Diabetes Brother     Breast cancer Maternal Grandmother          Current Outpatient Medications:     aspirin (ECOTRIN) 81 MG EC tablet, Take 81 mg by mouth once daily., Disp: , Rfl:     ergocalciferol (ERGOCALCIFEROL) 50,000 unit Cap, TAKE 1 CAPSULE BY MOUTH ON THE 1ST AND THE 15TH OF THE MONTH FOR 3 MONTHS, Disp: , Rfl:     estradioL (ESTRACE) 1 MG tablet,  "Take 1 tablet (1 mg total) by mouth once daily., Disp: 90 tablet, Rfl: 2    gabapentin (NEURONTIN) 100 MG capsule, Take two capsules by mouth twice daily., Disp: 360 capsule, Rfl: 1    glipiZIDE (GLUCOTROL) 5 MG tablet, Take 1 tablet (5 mg total) by mouth 2 (two) times daily with meals., Disp: 180 tablet, Rfl: 2    levothyroxine (SYNTHROID) 25 MCG tablet, Take 1 tablet (25 mcg total) by mouth once daily., Disp: 90 tablet, Rfl: 2    lisinopriL (PRINIVIL,ZESTRIL) 20 MG tablet, Take 1 tablet (20 mg total) by mouth once daily., Disp: 90 tablet, Rfl: 2    meloxicam (MOBIC) 7.5 MG tablet, Take 1 tablet (7.5 mg total) by mouth once daily. May increase to twice daily during pain flare, Disp: 90 tablet, Rfl: 2    pantoprazole (PROTONIX) 40 MG tablet, Take 1 tablet (40 mg total) by mouth once daily., Disp: 90 tablet, Rfl: 2    pravastatin (PRAVACHOL) 40 MG tablet, Take 1 tablet (40 mg total) by mouth every evening., Disp: 90 tablet, Rfl: 2    traZODone (DESYREL) 50 MG tablet, Take 1 tablet (50 mg total) by mouth once daily., Disp: 90 tablet, Rfl: 1    valproic acid (DEPAKENE) 250 mg capsule, Take 1 capsule (250 mg total) by mouth 2 (two) times daily., Disp: 180 capsule, Rfl: 2    mupirocin (BACTROBAN) 2 % ointment, Apply topically 2 (two) times daily. for 5 days, Disp: 15 g, Rfl: 0    Review of Systems   Constitutional: Negative for chills, fatigue and fever.   Respiratory: Negative for cough and shortness of breath.    Cardiovascular: Negative for chest pain, palpitations and leg swelling.   Gastrointestinal: Negative for abdominal pain, nausea and vomiting.   Musculoskeletal: Negative for gait problem.   Integumentary:  Negative for wound.   Neurological: Negative for dizziness, syncope, light-headedness and headaches.       Objective     /76   Pulse 94   Temp 97.2 °F (36.2 °C)   Ht 5' 2" (1.575 m)   Wt 87.5 kg (193 lb)   SpO2 95%   BMI 35.30 kg/m²     Physical Exam   Constitutional: She is oriented to " person, place, and time. No distress.   HENT:   Head: Normocephalic and atraumatic.   Mouth/Throat: Mucous membranes are moist.   Eyes: Pupils are equal, round, and reactive to light. Conjunctivae are normal.   Cardiovascular: Normal rate and regular rhythm.   Pulmonary/Chest: Effort normal and breath sounds normal. No respiratory distress. She has no wheezes. She has no rhonchi. She has no rales.   Abdominal: Soft. Bowel sounds are normal. There is no abdominal tenderness.   Musculoskeletal:         General: Normal range of motion.      Cervical back: Normal range of motion and neck supple.      Right lower leg: No edema.      Left lower leg: No edema.   Neurological: She is alert and oriented to person, place, and time. Gait normal.   Skin: Skin is warm and dry.        Psychiatric: Her behavior is normal. Mood normal.       Lab Results   Component Value Date    WBC 8.59 11/29/2020    HGB 12.7 11/29/2020    HCT 39.5 11/29/2020    MCV 95 11/29/2020     11/29/2020       CMP  Sodium   Date Value Ref Range Status   09/23/2021 135 (L) 136 - 145 mmol/L Final     Potassium   Date Value Ref Range Status   09/23/2021 4.9 3.5 - 5.1 mmol/L Final     Chloride   Date Value Ref Range Status   09/23/2021 102 98 - 107 mmol/L Final     CO2   Date Value Ref Range Status   09/23/2021 29 21 - 32 mmol/L Final     Glucose   Date Value Ref Range Status   09/23/2021 198 (H) 74 - 106 mg/dL Final     BUN   Date Value Ref Range Status   09/23/2021 23 (H) 7 - 18 mg/dL Final     Creatinine   Date Value Ref Range Status   09/23/2021 1.48 (H) 0.55 - 1.02 mg/dL Final     Calcium   Date Value Ref Range Status   09/23/2021 10.1 8.5 - 10.1 mg/dL Final     Total Protein   Date Value Ref Range Status   09/23/2021 7.3 6.4 - 8.2 g/dL Final     Albumin   Date Value Ref Range Status   09/23/2021 3.4 (L) 3.5 - 5.0 g/dL Final     Bilirubin, Total   Date Value Ref Range Status   09/23/2021 0.3 >0.0 - 1.2 mg/dL Final     Alk Phos   Date Value Ref Range  Status   09/23/2021 61 55 - 142 U/L Final     AST   Date Value Ref Range Status   09/23/2021 29 15 - 37 U/L Final     ALT   Date Value Ref Range Status   09/23/2021 30 13 - 56 U/L Final     Anion Gap   Date Value Ref Range Status   09/23/2021 9 7 - 16 mmol/L Final     eGFR    Date Value Ref Range Status   11/30/2020 42       eGFR   Date Value Ref Range Status   09/23/2021 37 (L) >=60 mL/min/1.73m² Final     No results found for: TSH  Lab Results   Component Value Date    CHOL 179 09/23/2021    CHOL 252 (A) 10/04/2017     Lab Results   Component Value Date    HDL 32 (L) 09/23/2021    HDL 38 10/04/2017     Lab Results   Component Value Date    LDLCALC  09/23/2021      Comment:      Unable to calculate due to one of the following values:  Cholesterol <5  HDL Cholesterol <5  Triglycerides <10 or >400    LDLCALC  10/04/2017      Comment:      TRI > 400 INVALID     Lab Results   Component Value Date    TRIG 429 (H) 09/23/2021    TRIG 779 (A) 10/04/2017     Lab Results   Component Value Date    CHOLHDL 5.6 09/23/2021     Lab Results   Component Value Date    HGBA1C 10.8 (H) 09/23/2021         Assessment and Plan   Insect stings, accidental or unintentional, initial encounter    Fire ant bite, accidental or unintentional, initial encounter  -     mupirocin (BACTROBAN) 2 % ointment; Apply topically 2 (two) times daily. for 5 days  Dispense: 15 g; Refill: 0          Patient Instructions  Patient Instructions   Benadryl cream to insect sting on right forearm     Bactroban ointment to ant bites on left ankle    Monitor for any signs of infection, follow up with PCP if any occur     Continue allegra daily

## 2022-04-14 NOTE — PATIENT INSTRUCTIONS
Benadryl cream to insect sting on right forearm     Bactroban ointment to ant bites on left ankle    Monitor for any signs of infection, follow up with PCP if any occur     Continue allegra daily

## 2022-05-13 ENCOUNTER — OFFICE VISIT (OUTPATIENT)
Dept: FAMILY MEDICINE | Facility: CLINIC | Age: 67
End: 2022-05-13
Payer: MEDICARE

## 2022-05-13 VITALS
HEART RATE: 85 BPM | SYSTOLIC BLOOD PRESSURE: 136 MMHG | HEIGHT: 62 IN | BODY MASS INDEX: 35.51 KG/M2 | WEIGHT: 193 LBS | DIASTOLIC BLOOD PRESSURE: 75 MMHG

## 2022-05-13 DIAGNOSIS — G89.29 CHRONIC PAIN OF LEFT ANKLE: ICD-10-CM

## 2022-05-13 DIAGNOSIS — M25.572 CHRONIC PAIN OF LEFT ANKLE: ICD-10-CM

## 2022-05-13 DIAGNOSIS — I10 ESSENTIAL HYPERTENSION: Chronic | ICD-10-CM

## 2022-05-13 DIAGNOSIS — E11.40 TYPE 2 DIABETES MELLITUS WITH DIABETIC NEUROPATHY, WITHOUT LONG-TERM CURRENT USE OF INSULIN: Primary | Chronic | ICD-10-CM

## 2022-05-13 PROCEDURE — 85025 CBC WITH DIFFERENTIAL: ICD-10-PCS | Mod: ,,, | Performed by: CLINICAL MEDICAL LABORATORY

## 2022-05-13 PROCEDURE — 80048 BASIC METABOLIC PNL TOTAL CA: CPT | Mod: ,,, | Performed by: CLINICAL MEDICAL LABORATORY

## 2022-05-13 PROCEDURE — 83036 HEMOGLOBIN GLYCOSYLATED A1C: CPT | Mod: ,,, | Performed by: CLINICAL MEDICAL LABORATORY

## 2022-05-13 PROCEDURE — 99214 PR OFFICE/OUTPT VISIT, EST, LEVL IV, 30-39 MIN: ICD-10-PCS | Mod: ,,, | Performed by: FAMILY MEDICINE

## 2022-05-13 PROCEDURE — 83036 HEMOGLOBIN A1C: ICD-10-PCS | Mod: ,,, | Performed by: CLINICAL MEDICAL LABORATORY

## 2022-05-13 PROCEDURE — 99214 OFFICE O/P EST MOD 30 MIN: CPT | Mod: ,,, | Performed by: FAMILY MEDICINE

## 2022-05-13 PROCEDURE — 80048 BASIC METABOLIC PANEL: ICD-10-PCS | Mod: ,,, | Performed by: CLINICAL MEDICAL LABORATORY

## 2022-05-13 PROCEDURE — 85025 COMPLETE CBC W/AUTO DIFF WBC: CPT | Mod: ,,, | Performed by: CLINICAL MEDICAL LABORATORY

## 2022-05-13 RX ORDER — DICLOFENAC SODIUM 10 MG/G
2 GEL TOPICAL 4 TIMES DAILY
Qty: 200 G | Refills: 2 | Status: SHIPPED | OUTPATIENT
Start: 2022-05-13 | End: 2022-08-15 | Stop reason: SDUPTHER

## 2022-05-13 NOTE — PROGRESS NOTES
"   Robert Gallegos MD   [facility]  MEDICAL GROUP [facility] FAMILY MEDICINE  [address] [place] [address]  [phone]      PATIENT NAME: Rosalva Lopez  : 1955  DATE: 22  MRN: [id number]      Billing Provider: Robert Gallegos MD  Level of Service:   Patient PCP Information       Provider PCP Type    Robert Gallegos MD General            Reason for Visit / Chief Complaint: left leg pain, swelling (Left ankle swelling)       Update PCP  Update Chief Complaint         History of Present Illness / Problem Focused Workflow     Rosalva Lopez presents to the clinic with left leg pain, swelling (Left ankle swelling)       Has DM and needs A1C checked.  Also treated for HTN.  Reports compliance with meds and low sodium diet.  BP is doing well.    Having ankle and heel pain.  Reports having broken her ankle years ago and has bothered her "off and on" since then.  Has been wearing some shoes recently that do not have good ankle support and has been having pain and swelling.        Review of Systems     Review of Systems     Medical / Social / Family History     Past Medical History:   Diagnosis Date    Depressive disorder     Diabetes     GERD (gastroesophageal reflux disease)     Hypothyroidism     Trigeminal neuralgia of left side of face        Past Surgical History:   Procedure Laterality Date    BREAST BIOPSY      CARPAL TUNNEL RELEASE Left     CHOLECYSTECTOMY      HYSTERECTOMY      INGUINAL HERNIA REPAIR      TONSILLECTOMY      TYMPANOPLASTY         Social History    reports that she has never smoked. She has never used smokeless tobacco. She reports that she does not drink alcohol and does not use drugs.   Social History     Tobacco Use    Smoking status: Never Smoker    Smokeless tobacco: Never Used   Substance Use Topics    Alcohol use: Never    Drug use: Never       Family History  Family History   Problem Relation Age of Onset    Heart " disease Mother     Hypertension Father     Diabetes Brother     Breast cancer Maternal Grandmother        Medications and Allergies     Medications  No outpatient medications have been marked as taking for the 5/13/22 encounter (Office Visit) with Robert Gallegos MD.       Allergies  Review of patient's allergies indicates:   Allergen Reactions    Fluzone [id number] tri-whole     Sulfa (sulfonamide antibiotics)     Tamiflu [oseltamivir]        Physical Examination     Vitals:    05/13/22 1505   BP: 136/75   Pulse: 85     Physical Exam  Vitals reviewed.   Constitutional:       Appearance: Normal appearance.   HENT:      Head: Normocephalic and atraumatic.   Eyes:      Extraocular Movements: Extraocular movements intact.      Conjunctiva/sclera: Conjunctivae normal.      Pupils: Pupils are equal, round, and reactive to light.   Cardiovascular:      Rate and Rhythm: Normal rate and regular rhythm.      Heart sounds: Normal heart sounds.   Pulmonary:      Effort: Pulmonary effort is normal.      Breath sounds: Normal breath sounds.   Musculoskeletal:         General: Swelling (mild swelling of left ankle) present. Normal range of motion.      Cervical back: Normal range of motion.      Right lower leg: No edema.      Left lower leg: No edema.   Skin:     General: Skin is warm and dry.      Findings: No erythema.   Neurological:      General: No focal deficit present.      Mental Status: She is alert and oriented to person, place, and time.      Gait: Gait abnormal.   Psychiatric:         Mood and Affect: Mood normal.         Behavior: Behavior normal.          Assessment and Plan (including Health Maintenance)      Problem List  Smart Sets  Document Outside HM   :    Plan: will check some labs today.  Trial of voltaren gel for ankle pain and swelling.          Health Maintenance Due   Topic Date Due    Hepatitis C Screening  Never done    Diabetes Urine Screening  Never done    Foot Exam  Never done     TETANUS VACCINE  Never done    Colorectal Cancer Screening  Never done    Shingles Vaccine (1 of 2) Never done    Eye Exam  12/04/2021    Hemoglobin A1c  12/23/2021    Mammogram  07/12/2022       Problem List Items Addressed This Visit        Cardiac/Vascular    Essential hypertension    Relevant Orders    CBC Auto Differential    Basic Metabolic Panel       Endocrine    Type 2 diabetes mellitus with diabetic neuropathy, without long-term current use of insulin - Primary (Chronic)    Relevant Orders    Hemoglobin A1C       Orthopedic    Chronic pain of left ankle    Relevant Medications    diclofenac sodium (VOLTAREN) 1 % Gel          Health Maintenance Topics with due status: Not Due       Topic Last Completion Date    DEXA Scan 07/12/2021    Lipid Panel 09/23/2021    Low Dose Statin 04/14/2022    Influenza Vaccine Not Due       Future Appointments   Date Time Provider Department Center   7/7/2022 10:00 AM [facility], [facility]   7/18/2022  8:15 AM [facility]            Signature:  MD ABELARDO Murcia [facility]  MEDICAL GROUP [facility] - FAMILY MEDICINE  [address] [place] [address]  [phone]    Date of encounter: 5/13/22

## 2022-05-16 LAB
ANION GAP SERPL CALCULATED.3IONS-SCNC: 12 MMOL/L (ref 7–16)
BASOPHILS # BLD AUTO: 0.09 K/UL (ref 0–0.2)
BASOPHILS NFR BLD AUTO: 0.9 % (ref 0–1)
BUN SERPL-MCNC: 26 MG/DL (ref 7–18)
BUN/CREAT SERPL: 15 (ref 6–20)
CALCIUM SERPL-MCNC: 9.5 MG/DL (ref 8.5–10.1)
CHLORIDE SERPL-SCNC: 103 MMOL/L (ref 98–107)
CO2 SERPL-SCNC: 28 MMOL/L (ref 21–32)
CREAT SERPL-MCNC: 1.69 MG/DL (ref 0.55–1.02)
DIFFERENTIAL METHOD BLD: ABNORMAL
EOSINOPHIL # BLD AUTO: 0.38 K/UL (ref 0–0.5)
EOSINOPHIL NFR BLD AUTO: 4 % (ref 1–4)
ERYTHROCYTE [DISTWIDTH] IN BLOOD BY AUTOMATED COUNT: 16.5 % (ref 11.5–14.5)
EST. AVERAGE GLUCOSE BLD GHB EST-MCNC: 270 MG/DL
GLUCOSE SERPL-MCNC: 373 MG/DL (ref 74–106)
HBA1C MFR BLD HPLC: 10.7 % (ref 4.5–6.6)
HCT VFR BLD AUTO: 45.5 % (ref 38–47)
HGB BLD-MCNC: 14.8 G/DL (ref 12–16)
IMM GRANULOCYTES # BLD AUTO: 0.11 K/UL (ref 0–0.04)
IMM GRANULOCYTES NFR BLD: 1.2 % (ref 0–0.4)
LYMPHOCYTES # BLD AUTO: 2.12 K/UL (ref 1–4.8)
LYMPHOCYTES NFR BLD AUTO: 22.3 % (ref 27–41)
MCH RBC QN AUTO: 30.3 PG (ref 27–31)
MCHC RBC AUTO-ENTMCNC: 32.5 G/DL (ref 32–36)
MCV RBC AUTO: 93 FL (ref 80–96)
MONOCYTES # BLD AUTO: 0.91 K/UL (ref 0–0.8)
MONOCYTES NFR BLD AUTO: 9.6 % (ref 2–6)
MPC BLD CALC-MCNC: 12.2 FL (ref 9.4–12.4)
NEUTROPHILS # BLD AUTO: 5.91 K/UL (ref 1.8–7.7)
NEUTROPHILS NFR BLD AUTO: 62 % (ref 53–65)
NRBC # BLD AUTO: 0 X10E3/UL
NRBC, AUTO (.00): 0 %
PLATELET # BLD AUTO: 193 K/UL (ref 150–400)
POTASSIUM SERPL-SCNC: 5.7 MMOL/L (ref 3.5–5.1)
RBC # BLD AUTO: 4.89 M/UL (ref 4.2–5.4)
SODIUM SERPL-SCNC: 137 MMOL/L (ref 136–145)
WBC # BLD AUTO: 9.52 K/UL (ref 4.5–11)

## 2022-05-31 ENCOUNTER — EXTERNAL CHRONIC CARE MANAGEMENT (OUTPATIENT)
Dept: FAMILY MEDICINE | Facility: CLINIC | Age: 67
End: 2022-05-31
Payer: MEDICARE

## 2022-05-31 PROCEDURE — G0511 CCM/BHI BY RHC/FQHC 20MIN MO: HCPCS | Mod: ,,, | Performed by: FAMILY MEDICINE

## 2022-05-31 PROCEDURE — G0511 PR CHRONIC CARE MGMT, RHC OR FQHC ONLY, 20 MINS OR MORE: ICD-10-PCS | Mod: ,,, | Performed by: FAMILY MEDICINE

## 2022-06-24 ENCOUNTER — OFFICE VISIT (OUTPATIENT)
Dept: FAMILY MEDICINE | Facility: CLINIC | Age: 67
End: 2022-06-24
Payer: MEDICARE

## 2022-06-24 VITALS
HEIGHT: 62 IN | DIASTOLIC BLOOD PRESSURE: 85 MMHG | BODY MASS INDEX: 35.51 KG/M2 | HEART RATE: 87 BPM | SYSTOLIC BLOOD PRESSURE: 139 MMHG | WEIGHT: 193 LBS

## 2022-06-24 DIAGNOSIS — G89.29 CHRONIC MIDLINE LOW BACK PAIN WITHOUT SCIATICA: Chronic | ICD-10-CM

## 2022-06-24 DIAGNOSIS — M54.50 CHRONIC MIDLINE LOW BACK PAIN WITHOUT SCIATICA: Chronic | ICD-10-CM

## 2022-06-24 DIAGNOSIS — E87.5 HYPERKALEMIA: ICD-10-CM

## 2022-06-24 DIAGNOSIS — R06.02 SOB (SHORTNESS OF BREATH): Primary | ICD-10-CM

## 2022-06-24 DIAGNOSIS — E11.40 TYPE 2 DIABETES MELLITUS WITH DIABETIC NEUROPATHY, WITHOUT LONG-TERM CURRENT USE OF INSULIN: Chronic | ICD-10-CM

## 2022-06-24 LAB
ANION GAP SERPL CALCULATED.3IONS-SCNC: 14 MMOL/L (ref 7–16)
BUN SERPL-MCNC: 24 MG/DL (ref 7–18)
BUN/CREAT SERPL: 17 (ref 6–20)
CALCIUM SERPL-MCNC: 9.9 MG/DL (ref 8.5–10.1)
CHLORIDE SERPL-SCNC: 97 MMOL/L (ref 98–107)
CO2 SERPL-SCNC: 27 MMOL/L (ref 21–32)
CREAT SERPL-MCNC: 1.42 MG/DL (ref 0.55–1.02)
GLUCOSE SERPL-MCNC: 266 MG/DL (ref 74–106)
POTASSIUM SERPL-SCNC: 5.2 MMOL/L (ref 3.5–5.1)
SODIUM SERPL-SCNC: 133 MMOL/L (ref 136–145)

## 2022-06-24 PROCEDURE — 99214 OFFICE O/P EST MOD 30 MIN: CPT | Mod: ,,, | Performed by: FAMILY MEDICINE

## 2022-06-24 PROCEDURE — 99214 PR OFFICE/OUTPT VISIT, EST, LEVL IV, 30-39 MIN: ICD-10-PCS | Mod: ,,, | Performed by: FAMILY MEDICINE

## 2022-06-24 PROCEDURE — 80048 BASIC METABOLIC PANEL: ICD-10-PCS | Mod: ,,, | Performed by: CLINICAL MEDICAL LABORATORY

## 2022-06-24 PROCEDURE — 80048 BASIC METABOLIC PNL TOTAL CA: CPT | Mod: ,,, | Performed by: CLINICAL MEDICAL LABORATORY

## 2022-06-24 RX ORDER — ALBUTEROL SULFATE 90 UG/1
2 AEROSOL, METERED RESPIRATORY (INHALATION) EVERY 6 HOURS PRN
Qty: 8 G | Refills: 0 | Status: SHIPPED | OUTPATIENT
Start: 2022-06-24

## 2022-06-24 NOTE — PROGRESS NOTES
Robert Gallegos MD   [facility]  MEDICAL GROUP [address] [address]  [phone]      PATIENT NAME: Rosalva Lopez  : 1955  DATE: 22  MRN: [id number]      Billing Provider: Robert Gallegos MD  Level of Service:   Patient PCP Information       Provider PCP Type    Robert Gallegos MD General            Reason for Visit / Chief Complaint: Back Pain, Fatigue, and Shortness of Breath       Update PCP  Update Chief Complaint         History of Present Illness / Problem Focused Workflow     Rosalva Lopez presents to the clinic with Back Pain, Fatigue, and Shortness of Breath       68 yo WF who reports that she has been having some SOB since cutting yard a few days ago.  Gives history of mild asthma.  No longer ghas an inhaler due to misplacing her last one.  Also has some flare up of mid/low back pain, as well,  since pushing mower.  Last labs had hyperkalemia and needs to have rechecked.        Review of Systems     Review of Systems   Constitutional: Negative for activity change, chills and fever.   HENT: Negative for sore throat.    Eyes: Negative for pain.   Respiratory: Positive for shortness of breath. Negative for cough, chest tightness and wheezing.    Cardiovascular: Negative for chest pain and palpitations.   Gastrointestinal: Negative for abdominal pain.   Musculoskeletal: Positive for back pain and myalgias.   Neurological: Negative for dizziness, syncope and weakness.   Psychiatric/Behavioral: Negative for confusion.        Medical / Social / Family History     Past Medical History:   Diagnosis Date    Depressive disorder     Diabetes     GERD (gastroesophageal reflux disease)     Hypothyroidism     Trigeminal neuralgia of left side of face        Past Surgical History:   Procedure Laterality Date    BREAST BIOPSY      CARPAL TUNNEL RELEASE Left     CHOLECYSTECTOMY      HYSTERECTOMY      INGUINAL HERNIA  REPAIR      TONSILLECTOMY      TYMPANOPLASTY         Social History    reports that she has never smoked. She has never used smokeless tobacco. She reports that she does not drink alcohol and does not use drugs.   Social History     Tobacco Use    Smoking status: Never Smoker    Smokeless tobacco: Never Used   Substance Use Topics    Alcohol use: Never    Drug use: Never       Family History  Family History   Problem Relation Age of Onset    Heart disease Mother     Hypertension Father     Diabetes Brother     Breast cancer Maternal Grandmother        Medications and Allergies     Medications  No outpatient medications have been marked as taking for the 6/24/22 encounter (Office Visit) with Robert Gallegos MD.       Allergies  Review of patient's allergies indicates:   Allergen Reactions    Fluzone [id number] tri-whole     Sulfa (sulfonamide antibiotics)     Tamiflu [oseltamivir]        Physical Examination     Vitals:    06/24/22 1053   BP: 139/85   Pulse: 87     Physical Exam  Vitals reviewed.   Constitutional:       Appearance: Normal appearance.   HENT:      Head: Normocephalic and atraumatic.   Eyes:      Extraocular Movements: Extraocular movements intact.      Conjunctiva/sclera: Conjunctivae normal.      Pupils: Pupils are equal, round, and reactive to light.   Cardiovascular:      Rate and Rhythm: Normal rate and regular rhythm.      Heart sounds: Normal heart sounds.   Pulmonary:      Effort: Pulmonary effort is normal. No respiratory distress.      Breath sounds: Normal breath sounds. No wheezing, rhonchi or rales.   Musculoskeletal:         General: Tenderness (midback) present. Normal range of motion.      Cervical back: Normal range of motion.   Skin:     General: Skin is warm and dry.   Neurological:      General: No focal deficit present.      Mental Status: She is alert and oriented to person, place, and time.   Psychiatric:         Mood and Affect: Mood normal.         Behavior:  Behavior normal.          Assessment and Plan (including Health Maintenance)      Problem List  Smart Sets  Document Outside HM   :    Plan: recheck BMP.  Last K was 5.7 one month ago.  Has DM.  Last A1C was elevated.  She is working on diet.         Health Maintenance Due   Topic Date Due    Hepatitis C Screening  Never done    Diabetes Urine Screening  Never done    Foot Exam  Never done    TETANUS VACCINE  Never done    Colorectal Cancer Screening  Never done    Shingles Vaccine (1 of 2) Never done    Eye Exam  12/04/2021    Mammogram  07/12/2022       Problem List Items Addressed This Visit        Endocrine    Type 2 diabetes mellitus with diabetic neuropathy, without long-term current use of insulin (Chronic)    Relevant Orders    Basic Metabolic Panel       Orthopedic    Chronic low back pain (Chronic)      Other Visit Diagnoses     SOB (shortness of breath)    -  Primary    Hyperkalemia        Relevant Orders    Basic Metabolic Panel          Health Maintenance Topics with due status: Not Due       Topic Last Completion Date    DEXA Scan 07/12/2021    Lipid Panel 09/23/2021    Low Dose Statin 04/14/2022    Hemoglobin A1c 05/13/2022    Influenza Vaccine Not Due       Future Appointments   Date Time Provider Department Center   7/18/2022  8:15 AM [facility]   9/1/2022  9:00 AM AW[facility] NURSE, [facility]            Signature:  MD TONY Murcia [facility]  MEDICAL GROUP OF [place] - FAMILY MEDICINE  [address] [place] [address] [place] [address]  [phone]    Date of encounter: 6/24/22

## 2022-06-30 ENCOUNTER — EXTERNAL CHRONIC CARE MANAGEMENT (OUTPATIENT)
Dept: FAMILY MEDICINE | Facility: CLINIC | Age: 67
End: 2022-06-30
Payer: MEDICARE

## 2022-06-30 PROCEDURE — G0511 PR CHRONIC CARE MGMT, RHC OR FQHC ONLY, 20 MINS OR MORE: ICD-10-PCS | Mod: ,,, | Performed by: FAMILY MEDICINE

## 2022-06-30 PROCEDURE — G0511 CCM/BHI BY RHC/FQHC 20MIN MO: HCPCS | Mod: ,,, | Performed by: FAMILY MEDICINE

## 2022-07-18 ENCOUNTER — HOSPITAL ENCOUNTER (OUTPATIENT)
Dept: RADIOLOGY | Facility: HOSPITAL | Age: 67
Discharge: HOME | End: 2022-07-18
Attending: FAMILY MEDICINE
Payer: MEDICARE

## 2022-07-18 DIAGNOSIS — Z12.31 OTHER SCREENING MAMMOGRAM: ICD-10-CM

## 2022-07-18 PROCEDURE — 77067 SCR MAMMO BI INCL CAD: CPT | Mod: TC

## 2022-07-25 ENCOUNTER — HOSPITAL ENCOUNTER (EMERGENCY)
Facility: HOSPITAL | Age: 67
Discharge: HOME | End: 2022-07-25
Payer: MEDICARE

## 2022-07-25 VITALS
DIASTOLIC BLOOD PRESSURE: 79 MMHG | OXYGEN SATURATION: 95 % | WEIGHT: 200 LBS | TEMPERATURE: 98 F | HEART RATE: 81 BPM | HEIGHT: 62 IN | BODY MASS INDEX: 36.8 KG/M2 | SYSTOLIC BLOOD PRESSURE: 156 MMHG | RESPIRATION RATE: 18 BRPM

## 2022-07-25 DIAGNOSIS — M77.51 BONE SPUR OF RIGHT FOOT: ICD-10-CM

## 2022-07-25 DIAGNOSIS — M79.671 PAIN IN RIGHT FOOT: Primary | ICD-10-CM

## 2022-07-25 PROCEDURE — 99284 EMERGENCY DEPT VISIT MOD MDM: CPT

## 2022-07-25 PROCEDURE — 63600175 PHARM REV CODE 636 W HCPCS: Performed by: NURSE PRACTITIONER

## 2022-07-25 PROCEDURE — 99283 EMERGENCY DEPT VISIT LOW MDM: CPT | Performed by: NURSE PRACTITIONER

## 2022-07-25 PROCEDURE — 96372 THER/PROPH/DIAG INJ SC/IM: CPT

## 2022-07-25 RX ORDER — DEXAMETHASONE SODIUM PHOSPHATE 4 MG/ML
4 INJECTION, SOLUTION INTRA-ARTICULAR; INTRALESIONAL; INTRAMUSCULAR; INTRAVENOUS; SOFT TISSUE
Status: COMPLETED | OUTPATIENT
Start: 2022-07-25 | End: 2022-07-25

## 2022-07-25 RX ORDER — KETOROLAC TROMETHAMINE 30 MG/ML
15 INJECTION, SOLUTION INTRAMUSCULAR; INTRAVENOUS
Status: COMPLETED | OUTPATIENT
Start: 2022-07-25 | End: 2022-07-25

## 2022-07-25 RX ORDER — LORAZEPAM 0.5 MG/1
0.5 TABLET ORAL EVERY 6 HOURS PRN
COMMUNITY
End: 2023-04-28 | Stop reason: SDUPTHER

## 2022-07-25 RX ADMIN — KETOROLAC TROMETHAMINE 15 MG: 30 INJECTION, SOLUTION INTRAMUSCULAR at 01:07

## 2022-07-25 RX ADMIN — DEXAMETHASONE SODIUM PHOSPHATE 4 MG: 4 INJECTION, SOLUTION INTRAMUSCULAR; INTRAVENOUS at 01:07

## 2022-07-25 NOTE — DISCHARGE INSTRUCTIONS
Elevate your right foot as much as possible. Apply warm compresses 4-6 times per day. Take your Mobic and gabapentin as prescribed. Follow-up with your PCP if no improvement in 2-3 days or sooner for worsening.

## 2022-07-25 NOTE — ED PROVIDER NOTES
Encounter Date: 7/25/2022       History     Chief Complaint   Patient presents with    right foot pain     Presented with c/o pain to bottom of right foot that started 2 days ago. Denies injury but states had to walk more than usual outside her home on Saturday prior to onset of pain. States pain with any palpation or wt bearing. Denies history of similar pain.        Review of patient's allergies indicates:   Allergen Reactions    Fluzone [id number] tri-whole     Sulfa (sulfonamide antibiotics)     Tamiflu [oseltamivir]      Past Medical History:   Diagnosis Date    Depressive disorder     Diabetes     GERD (gastroesophageal reflux disease)     Hypothyroidism     Trigeminal neuralgia of left side of face      Past Surgical History:   Procedure Laterality Date    BREAST BIOPSY      CARPAL TUNNEL RELEASE Left     CHOLECYSTECTOMY      HYSTERECTOMY      INGUINAL HERNIA REPAIR      TONSILLECTOMY      TYMPANOPLASTY       Family History   Problem Relation Age of Onset    Heart disease Mother     Hypertension Father     Diabetes Brother     Breast cancer Maternal Grandmother      Social History     Tobacco Use    Smoking status: Never Smoker    Smokeless tobacco: Never Used   Substance Use Topics    Alcohol use: Never    Drug use: Never     Review of Systems   Constitutional: Positive for activity change. Negative for chills and fever.   HENT: Negative.    Respiratory: Negative.    Cardiovascular: Negative.    Gastrointestinal: Negative.    Genitourinary: Negative.    Musculoskeletal: Positive for arthralgias (right foot pain).   Skin: Negative.    Neurological: Negative.    Psychiatric/Behavioral: Negative.        Physical Exam     Initial Vitals   BP Pulse Resp Temp SpO2   07/25/22 0023 07/25/22 0023 07/25/22 0023 07/25/22 0018 07/25/22 0023   (!) 185/94 84 18 98 °F (36.7 °C) 96 %      MAP       --                Physical Exam    Nursing note and vitals reviewed.  Constitutional: She appears  well-developed and well-nourished. No distress.   HENT:   Head: Normocephalic.   Mouth/Throat: Oropharynx is clear and moist.   Eyes: Conjunctivae and EOM are normal. Pupils are equal, round, and reactive to light.   Neck: Neck supple.   Normal range of motion.  Cardiovascular: Normal rate, regular rhythm, normal heart sounds and intact distal pulses.   No murmur heard.  Pulmonary/Chest: Breath sounds normal.   Abdominal: Abdomen is soft. Bowel sounds are normal.   Musculoskeletal:         General: Tenderness (right mid foot at plantar/lateral aspect ) present.      Cervical back: Normal range of motion and neck supple.     Neurological: She is alert and oriented to person, place, and time. She has normal strength. GCS score is 15. GCS eye subscore is 4. GCS verbal subscore is 5. GCS motor subscore is 6.   Skin: Skin is warm and dry. Capillary refill takes less than 2 seconds. No erythema.   Psychiatric: She has a normal mood and affect. Her behavior is normal. Judgment and thought content normal.         Medical Screening Exam   See Full Note    ED Course   Procedures  Labs Reviewed - No data to display       Imaging Results          X-Ray Foot Complete Right (In process)               X-Rays:   Independently Interpreted Readings:   Other Readings:  Right foot: tiny plantar spur. No other abnormality.    Medications   dexamethasone injection 4 mg (has no administration in time range)   ketorolac injection 15 mg (has no administration in time range)                       Clinical Impression:   Final diagnoses:  [M79.671] Pain in right foot (Primary)  [M77.51] Bone spur of right foot          ED Disposition Condition    Discharge Stable        ED Prescriptions     None        Follow-up Information     Follow up With Specialties Details Why Contact Info    Robert Gallegos MD Family Medicine In 3 days If not better [address] [place] [address]  [facility] [address]  [phone]              Jerilyn Diaz NP  07/25/22 0141

## 2022-07-25 NOTE — ED TRIAGE NOTES
Presents to ER with c/o right foot pain that started yesterday.  Took some tylenol around noon yesterday.  Swollen underneath her foot on the ball of her foot.  Denies injuring it in any way. States had a tick on her about 2 weeks ago.

## 2022-07-26 ENCOUNTER — OFFICE VISIT (OUTPATIENT)
Dept: FAMILY MEDICINE | Facility: CLINIC | Age: 67
End: 2022-07-26
Payer: MEDICARE

## 2022-07-26 VITALS — DIASTOLIC BLOOD PRESSURE: 80 MMHG | SYSTOLIC BLOOD PRESSURE: 167 MMHG | HEART RATE: 75 BPM

## 2022-07-26 DIAGNOSIS — E11.40 TYPE 2 DIABETES MELLITUS WITH DIABETIC NEUROPATHY, WITHOUT LONG-TERM CURRENT USE OF INSULIN: Chronic | ICD-10-CM

## 2022-07-26 DIAGNOSIS — I10 ESSENTIAL HYPERTENSION: Chronic | ICD-10-CM

## 2022-07-26 DIAGNOSIS — M79.671 FOOT PAIN, RIGHT: Primary | ICD-10-CM

## 2022-07-26 DIAGNOSIS — E89.40 ASYMPTOMATIC POSTSURGICAL MENOPAUSE: Chronic | ICD-10-CM

## 2022-07-26 DIAGNOSIS — K21.9 GASTROESOPHAGEAL REFLUX DISEASE WITHOUT ESOPHAGITIS: Chronic | ICD-10-CM

## 2022-07-26 DIAGNOSIS — E03.9 ACQUIRED HYPOTHYROIDISM: Chronic | ICD-10-CM

## 2022-07-26 PROCEDURE — 99214 OFFICE O/P EST MOD 30 MIN: CPT | Mod: ,,, | Performed by: FAMILY MEDICINE

## 2022-07-26 PROCEDURE — 99214 PR OFFICE/OUTPT VISIT, EST, LEVL IV, 30-39 MIN: ICD-10-PCS | Mod: ,,, | Performed by: FAMILY MEDICINE

## 2022-07-26 RX ORDER — ESTRADIOL 1 MG/1
1 TABLET ORAL DAILY
Qty: 90 TABLET | Refills: 1 | Status: SHIPPED | OUTPATIENT
Start: 2022-07-26 | End: 2023-01-27 | Stop reason: SDUPTHER

## 2022-07-26 RX ORDER — LEVOTHYROXINE SODIUM 25 UG/1
25 TABLET ORAL DAILY
Qty: 90 TABLET | Refills: 1 | Status: SHIPPED | OUTPATIENT
Start: 2022-07-26 | End: 2022-09-30

## 2022-07-26 RX ORDER — PANTOPRAZOLE SODIUM 40 MG/1
40 TABLET, DELAYED RELEASE ORAL DAILY
Qty: 90 TABLET | Refills: 1 | Status: SHIPPED | OUTPATIENT
Start: 2022-07-26 | End: 2023-01-27 | Stop reason: SDUPTHER

## 2022-07-26 RX ORDER — GLIPIZIDE 5 MG/1
5 TABLET ORAL 2 TIMES DAILY WITH MEALS
Qty: 180 TABLET | Refills: 2 | Status: SHIPPED | OUTPATIENT
Start: 2022-07-26 | End: 2023-04-28 | Stop reason: SDUPTHER

## 2022-07-26 RX ORDER — LISINOPRIL 20 MG/1
20 TABLET ORAL DAILY
Qty: 90 TABLET | Refills: 1 | Status: SHIPPED | OUTPATIENT
Start: 2022-07-26 | End: 2023-03-10

## 2022-07-26 NOTE — PROGRESS NOTES
Robert Gallegos MD   [facility]  MEDICAL GROUP [facility] FAMILY MEDICINE  [address] [place] [address]  [phone]      PATIENT NAME: Rosalva Lopez  : 1955  DATE: 22  MRN: [id number]      Billing Provider: Robert Gallegos MD  Level of Service:   Patient PCP Information       Provider PCP Type    Robert Gallegos MD General            Reason for Visit / Chief Complaint: Follow-up (Follow-up from ER visit on 2022 for right foot pain due to bone spur. )       Update PCP  Update Chief Complaint         History of Present Illness / Problem Focused Workflow     Rosalva Lopez presents to the clinic with Follow-up (Follow-up from ER visit on 2022 for right foot pain due to bone spur. )       Needs several meds refilled.  Was seen at ED last night for foot pain.  Xray showed small bone spur.      Review of Systems     Review of Systems   Constitutional: Negative for activity change, chills and fever.   HENT: Negative for sore throat.    Eyes: Negative for pain.   Respiratory: Negative for cough, chest tightness and shortness of breath.    Cardiovascular: Negative for chest pain and palpitations.   Gastrointestinal: Negative for abdominal pain.   Musculoskeletal: Positive for arthralgias.   Neurological: Negative for dizziness, syncope and weakness.   Psychiatric/Behavioral: Negative for confusion.        Medical / Social / Family History     Past Medical History:   Diagnosis Date    Depressive disorder     Diabetes     GERD (gastroesophageal reflux disease)     Hypothyroidism     Trigeminal neuralgia of left side of face        Past Surgical History:   Procedure Laterality Date    BREAST BIOPSY      CARPAL TUNNEL RELEASE Left     CHOLECYSTECTOMY      HYSTERECTOMY      INGUINAL HERNIA REPAIR      TONSILLECTOMY      TYMPANOPLASTY         Social History    reports that she has never smoked. She has never used smokeless tobacco. She reports  that she does not drink alcohol and does not use drugs.   Social History     Tobacco Use    Smoking status: Never Smoker    Smokeless tobacco: Never Used   Substance Use Topics    Alcohol use: Never    Drug use: Never       Family History  Family History   Problem Relation Age of Onset    Heart disease Mother     Hypertension Father     Diabetes Brother     Breast cancer Maternal Grandmother        Medications and Allergies     Medications  Outpatient Medications Marked as Taking for the 7/26/22 encounter (Office Visit) with Robert Gallegos MD   Medication Sig Dispense Refill    LORazepam (ATIVAN) 0.5 MG tablet Take 0.5 mg by mouth every 6 (six) hours as needed for Anxiety.      [DISCONTINUED] estradioL (ESTRACE) 1 MG tablet Take 1 tablet by mouth once daily 90 tablet 1    [DISCONTINUED] levothyroxine (SYNTHROID) 25 MCG tablet Take 1 tablet by mouth once daily 90 tablet 1    [DISCONTINUED] lisinopriL (PRINIVIL,ZESTRIL) 20 MG tablet Take 1 tablet by mouth once daily 90 tablet 1    [DISCONTINUED] pantoprazole (PROTONIX) 40 MG tablet Take 1 tablet by mouth once daily 90 tablet 1       Allergies  Review of patient's allergies indicates:   Allergen Reactions    Fluzone [id number] tri-whole     Sulfa (sulfonamide antibiotics)     Tamiflu [oseltamivir]        Physical Examination     Vitals:    07/26/22 1625   BP: (!) 167/80   Pulse: 75     Physical Exam  Vitals reviewed.   Constitutional:       Appearance: Normal appearance.   HENT:      Head: Normocephalic and atraumatic.   Eyes:      Extraocular Movements: Extraocular movements intact.      Conjunctiva/sclera: Conjunctivae normal.      Pupils: Pupils are equal, round, and reactive to light.   Cardiovascular:      Rate and Rhythm: Normal rate and regular rhythm.      Heart sounds: Normal heart sounds.   Pulmonary:      Effort: Pulmonary effort is normal.      Breath sounds: Normal breath sounds.   Musculoskeletal:         General: Tenderness (lateral  right midfoot plantar surface) present. Normal range of motion.      Cervical back: Normal range of motion.   Skin:     General: Skin is warm and dry.      Findings: No bruising, erythema or lesion.   Neurological:      General: No focal deficit present.      Mental Status: She is alert and oriented to person, place, and time.      Gait: Gait abnormal.   Psychiatric:         Mood and Affect: Mood normal.         Behavior: Behavior normal.      X-Ray Foot Complete Right  Narrative: EXAMINATION:  XR FOOT COMPLETE 3 VIEW RIGHT    CLINICAL HISTORY:  . Pain in right foot, pain along dorsum of the foot x2 days    TECHNIQUE:  AP, lateral, and oblique views of the right foot were performed.    COMPARISON:  None    FINDINGS:  No fracture detected.  No dislocation.  Mild degenerative changes seen diffusely.  No radiopaque foreign bodies.  Impression: No acute findings.    Electronically signed by: Scooby Mcghee  Date:    07/25/2022  Time:    07:53        Assessment and Plan (including Health Maintenance)      Problem List  Smart Virtualmin  Document Outside HM   :    Plan: epsom salt soaks.  Wear shoes with good padding.        Health Maintenance Due   Topic Date Due    Hepatitis C Screening  Never done    Diabetes Urine Screening  Never done    Foot Exam  Never done    TETANUS VACCINE  Never done    Colorectal Cancer Screening  Never done    Shingles Vaccine (1 of 2) Never done    Eye Exam  12/04/2021       Problem List Items Addressed This Visit        Cardiac/Vascular    Essential hypertension    Relevant Medications    lisinopriL (PRINIVIL,ZESTRIL) 20 MG tablet       Renal/    Asymptomatic postsurgical menopause (Chronic)    Relevant Medications    estradioL (ESTRACE) 1 MG tablet       Endocrine    Type 2 diabetes mellitus with diabetic neuropathy, without long-term current use of insulin (Chronic)    Relevant Medications    glipiZIDE (GLUCOTROL) 5 MG tablet    Acquired hypothyroidism (Chronic)    Relevant Medications     levothyroxine (SYNTHROID) 25 MCG tablet       GI    Gastroesophageal reflux disease without esophagitis (Chronic)    Relevant Medications    pantoprazole (PROTONIX) 40 MG tablet      Other Visit Diagnoses     Foot pain, right    -  Primary          Health Maintenance Topics with due status: Not Due       Topic Last Completion Date    DEXA Scan 07/12/2021    Lipid Panel 09/23/2021    Hemoglobin A1c 05/13/2022    Mammogram 07/18/2022    Low Dose Statin 07/25/2022    Influenza Vaccine Not Due       Future Appointments   Date Time Provider Department Center   9/1/2022  9:00 AM [facility] NURSE, [facility] FAMILY MEDICINE [facility]   7/24/2023  8:15 AM [facility]            Signature:  Robert Gallegos MD  [facility]  MEDICAL GROUP [facility] FAMILY MEDICINE  [address] [place] [address] [place] [address]  [phone]    Date of encounter: 7/26/22

## 2022-07-31 ENCOUNTER — EXTERNAL CHRONIC CARE MANAGEMENT (OUTPATIENT)
Dept: FAMILY MEDICINE | Facility: CLINIC | Age: 67
End: 2022-07-31
Payer: MEDICARE

## 2022-07-31 PROCEDURE — G0511 PR CHRONIC CARE MGMT, RHC OR FQHC ONLY, 20 MINS OR MORE: ICD-10-PCS | Mod: ,,, | Performed by: FAMILY MEDICINE

## 2022-07-31 PROCEDURE — G0511 CCM/BHI BY RHC/FQHC 20MIN MO: HCPCS | Mod: ,,, | Performed by: FAMILY MEDICINE

## 2022-08-12 ENCOUNTER — OFFICE VISIT (OUTPATIENT)
Dept: FAMILY MEDICINE | Facility: CLINIC | Age: 67
End: 2022-08-12
Payer: MEDICARE

## 2022-08-12 VITALS
BODY MASS INDEX: 36.8 KG/M2 | SYSTOLIC BLOOD PRESSURE: 143 MMHG | DIASTOLIC BLOOD PRESSURE: 84 MMHG | WEIGHT: 200 LBS | HEART RATE: 79 BPM | HEIGHT: 62 IN

## 2022-08-12 DIAGNOSIS — M79.10 MUSCLE PAIN: Primary | ICD-10-CM

## 2022-08-12 DIAGNOSIS — M25.572 CHRONIC PAIN OF LEFT ANKLE: ICD-10-CM

## 2022-08-12 DIAGNOSIS — M54.6 ACUTE LEFT-SIDED THORACIC BACK PAIN: ICD-10-CM

## 2022-08-12 DIAGNOSIS — G89.29 CHRONIC PAIN OF LEFT ANKLE: ICD-10-CM

## 2022-08-12 PROCEDURE — 99213 PR OFFICE/OUTPT VISIT, EST, LEVL III, 20-29 MIN: ICD-10-PCS | Mod: ,,, | Performed by: FAMILY MEDICINE

## 2022-08-12 PROCEDURE — 99213 OFFICE O/P EST LOW 20 MIN: CPT | Mod: ,,, | Performed by: FAMILY MEDICINE

## 2022-08-12 NOTE — PROGRESS NOTES
Robert Gallegos MD   [facility]  [facility] [address] [address]  [phone]      PATIENT NAME: Rosalva Lopez  : 1955  DATE: 22  MRN: [id number]      Billing Provider: Robert Gallegos MD  Level of Service:   Patient PCP Information       Provider PCP Type    Robert Gallegos MD General            Reason for Visit / Chief Complaint: Other (Complains of painful knot on back that will not stop hurting. )       Update PCP  Update Chief Complaint         History of Present Illness / Problem Focused Workflow     Rosalva Lopez presents to the clinic with Other (Complains of painful knot on back that will not stop hurting. )       Has been to chiropractor, Dr. Cesar, but pain did not resolve with treatment.      Review of Systems     Review of Systems   Constitutional: Negative for activity change, chills and fever.   HENT: Negative for sore throat.    Eyes: Negative for pain.   Respiratory: Negative for cough, chest tightness and shortness of breath.    Cardiovascular: Negative for chest pain and palpitations.   Gastrointestinal: Negative for abdominal pain.   Musculoskeletal: Positive for back pain and myalgias.   Neurological: Negative for dizziness, seizures, syncope, weakness and numbness.   Psychiatric/Behavioral: Negative for confusion.        Medical / Social / Family History     Past Medical History:   Diagnosis Date    Depressive disorder     Diabetes     GERD (gastroesophageal reflux disease)     Hypothyroidism     Trigeminal neuralgia of left side of face        Past Surgical History:   Procedure Laterality Date    BREAST BIOPSY      CARPAL TUNNEL RELEASE Left     CHOLECYSTECTOMY      HYSTERECTOMY      INGUINAL HERNIA REPAIR      TONSILLECTOMY      TYMPANOPLASTY         Social History    reports that she has never smoked. She has never used smokeless tobacco. She reports that she does not drink  alcohol and does not use drugs.   Social History     Tobacco Use    Smoking status: Never Smoker    Smokeless tobacco: Never Used   Substance Use Topics    Alcohol use: Never    Drug use: Never       Family History  Family History   Problem Relation Age of Onset    Heart disease Mother     Hypertension Father     Diabetes Brother     Breast cancer Maternal Grandmother        Medications and Allergies     Medications  Outpatient Medications Marked as Taking for the 8/12/22 encounter (Office Visit) with Robert Gallegos MD   Medication Sig Dispense Refill    albuterol (VENTOLIN HFA) 90 mcg/actuation inhaler Inhale 2 puffs into the lungs every 6 (six) hours as needed for Wheezing or Shortness of Breath. Rescue 8 g 0    aspirin (ECOTRIN) 81 MG EC tablet Take 81 mg by mouth once daily.      ergocalciferol (ERGOCALCIFEROL) 50,000 unit Cap Take 5,000 Units by mouth once daily.      estradioL (ESTRACE) 1 MG tablet Take 1 tablet (1 mg total) by mouth once daily. 90 tablet 1    gabapentin (NEURONTIN) 100 MG capsule Take two capsules by mouth twice daily. 360 capsule 1    glipiZIDE (GLUCOTROL) 5 MG tablet Take 1 tablet (5 mg total) by mouth 2 (two) times daily with meals. 180 tablet 2    levothyroxine (SYNTHROID) 25 MCG tablet Take 1 tablet (25 mcg total) by mouth once daily. 90 tablet 1    lisinopriL (PRINIVIL,ZESTRIL) 20 MG tablet Take 1 tablet (20 mg total) by mouth once daily. 90 tablet 1    LORazepam (ATIVAN) 0.5 MG tablet Take 0.5 mg by mouth every 6 (six) hours as needed for Anxiety.      meloxicam (MOBIC) 7.5 MG tablet Take 1 tablet (7.5 mg total) by mouth once daily. May increase to twice daily during pain flare 90 tablet 2    pantoprazole (PROTONIX) 40 MG tablet Take 1 tablet (40 mg total) by mouth once daily. 90 tablet 1    pravastatin (PRAVACHOL) 40 MG tablet Take 1 tablet (40 mg total) by mouth every evening. 90 tablet 2    traZODone (DESYREL) 50 MG tablet Take 1 tablet (50 mg total) by mouth  once daily. 90 tablet 1    valproic acid (DEPAKENE) 250 mg capsule Take 1 capsule (250 mg total) by mouth 2 (two) times daily. 180 capsule 2    [DISCONTINUED] diclofenac sodium (VOLTAREN) 1 % Gel Apply 2 g topically 4 (four) times daily. To ankle 200 g 2       Allergies  Review of patient's allergies indicates:   Allergen Reactions    Fluzone 6520-6684 tri-whole     Sulfa (sulfonamide antibiotics)     Tamiflu [oseltamivir]        Physical Examination     Vitals:    08/12/22 1449   BP: (!) 143/84   Pulse: 79     Physical Exam  Vitals reviewed.   Constitutional:       Appearance: Normal appearance.   HENT:      Head: Normocephalic and atraumatic.   Eyes:      Extraocular Movements: Extraocular movements intact.      Conjunctiva/sclera: Conjunctivae normal.      Pupils: Pupils are equal, round, and reactive to light.   Cardiovascular:      Rate and Rhythm: Normal rate and regular rhythm.      Heart sounds: Normal heart sounds.   Pulmonary:      Effort: Pulmonary effort is normal.      Breath sounds: Normal breath sounds.   Musculoskeletal:         General: Tenderness (paraspinous muscle TTP left thoracic) present. Normal range of motion.      Cervical back: Normal range of motion.   Skin:     General: Skin is warm and dry.   Neurological:      General: No focal deficit present.      Mental Status: She is alert and oriented to person, place, and time.   Psychiatric:         Mood and Affect: Mood normal.         Behavior: Behavior normal.          Assessment and Plan (including Health Maintenance)      Problem List  Smart Sets  Document Outside HM   :    Plan:         Health Maintenance Due   Topic Date Due    Hepatitis C Screening  Never done    Diabetes Urine Screening  Never done    Foot Exam  Never done    TETANUS VACCINE  Never done    Colorectal Cancer Screening  Never done    Shingles Vaccine (1 of 2) Never done    Eye Exam  12/04/2021    Hemoglobin A1c  08/13/2022       Problem List Items Addressed  This Visit        Orthopedic    Chronic pain of left ankle      Other Visit Diagnoses     Muscle pain    -  Primary    Relevant Medications    diclofenac sodium (VOLTAREN) 1 % Gel    Acute left-sided thoracic back pain        Relevant Medications    diclofenac sodium (VOLTAREN) 1 % Gel          Health Maintenance Topics with due status: Not Due       Topic Last Completion Date    DEXA Scan 07/12/2021    Lipid Panel 09/23/2021    Mammogram 07/18/2022    Low Dose Statin 08/12/2022    Influenza Vaccine Not Due       Future Appointments   Date Time Provider Department Center   9/1/2022  9:00 AM [facility] NURSE, [facility] FAMILY MEDICINE [facility]   7/24/2023  8:15 AM [facility]            Signature:  MD ABELARDO Murcia NEAL [facility]  MEDICAL GROUP [facility] - FAMILY MEDICINE  [address] [place] [address] [place] [address]  [phone]    Date of encounter: 8/12/22

## 2022-08-15 RX ORDER — DICLOFENAC SODIUM 10 MG/G
2 GEL TOPICAL 4 TIMES DAILY
Qty: 100 G | Refills: 0
Start: 2022-08-15

## 2022-08-20 ENCOUNTER — HOSPITAL ENCOUNTER (EMERGENCY)
Facility: HOSPITAL | Age: 67
Discharge: HOME | End: 2022-08-21
Payer: MEDICARE

## 2022-08-20 DIAGNOSIS — R06.02 SHORTNESS OF BREATH: ICD-10-CM

## 2022-08-20 DIAGNOSIS — E86.0 MILD DEHYDRATION: Primary | ICD-10-CM

## 2022-08-20 DIAGNOSIS — R73.9 HYPERGLYCEMIA: ICD-10-CM

## 2022-08-20 DIAGNOSIS — R05.9 COUGH: ICD-10-CM

## 2022-08-20 LAB
ALBUMIN SERPL BCP-MCNC: 3.2 G/DL (ref 3.5–5)
ALBUMIN/GLOB SERPL: 0.8 {RATIO}
ALP SERPL-CCNC: 84 U/L (ref 55–142)
ALT SERPL W P-5'-P-CCNC: 22 U/L (ref 13–56)
ANION GAP SERPL CALCULATED.3IONS-SCNC: 17 MMOL/L (ref 7–16)
AST SERPL W P-5'-P-CCNC: 12 U/L (ref 15–37)
BASOPHILS # BLD AUTO: 0.02 K/UL (ref 0–0.2)
BASOPHILS NFR BLD AUTO: 0.2 % (ref 0–1)
BILIRUB SERPL-MCNC: 0.3 MG/DL (ref 0–1.2)
BILIRUB UR QL STRIP: NEGATIVE
BUN SERPL-MCNC: 23 MG/DL (ref 7–18)
BUN/CREAT SERPL: 14 (ref 6–20)
CALCIUM SERPL-MCNC: 9.2 MG/DL (ref 8.5–10.1)
CHLORIDE SERPL-SCNC: 98 MMOL/L (ref 98–107)
CLARITY UR: CLEAR
CO2 SERPL-SCNC: 25 MMOL/L (ref 21–32)
COLOR UR: ABNORMAL
CREAT SERPL-MCNC: 1.59 MG/DL (ref 0.55–1.02)
DIFFERENTIAL METHOD BLD: ABNORMAL
EGFR (NO RACE VARIABLE) (RUSH/TITUS): 35 ML/MIN/1.73M²
EOSINOPHIL # BLD AUTO: 0.29 K/UL (ref 0–0.5)
EOSINOPHIL NFR BLD AUTO: 3.1 % (ref 1–4)
ERYTHROCYTE [DISTWIDTH] IN BLOOD BY AUTOMATED COUNT: 15.3 % (ref 11.5–14.5)
GLOBULIN SER-MCNC: 4.1 G/DL (ref 2–4)
GLUCOSE SERPL-MCNC: 317 MG/DL (ref 74–106)
GLUCOSE UR STRIP-MCNC: 500 MG/DL
HCT VFR BLD AUTO: 42.8 % (ref 38–47)
HGB BLD-MCNC: 14.9 G/DL (ref 12–16)
KETONES UR STRIP-SCNC: NEGATIVE MG/DL
LEUKOCYTE ESTERASE UR QL STRIP: NEGATIVE
LYMPHOCYTES # BLD AUTO: 2.55 K/UL (ref 1–4.8)
LYMPHOCYTES NFR BLD AUTO: 27.7 % (ref 27–41)
MCH RBC QN AUTO: 30.5 PG (ref 27–31)
MCHC RBC AUTO-ENTMCNC: 34.8 G/DL (ref 32–36)
MCV RBC AUTO: 87.5 FL (ref 80–96)
MONOCYTES # BLD AUTO: 0.82 K/UL (ref 0–0.8)
MONOCYTES NFR BLD AUTO: 8.9 % (ref 2–6)
MPC BLD CALC-MCNC: 11 FL (ref 9.4–12.4)
NEUTROPHILS # BLD AUTO: 5.53 K/UL (ref 1.8–7.7)
NEUTROPHILS NFR BLD AUTO: 60.1 % (ref 53–65)
NITRITE UR QL STRIP: NEGATIVE
PH UR STRIP: 5.5 PH UNITS
PLATELET # BLD AUTO: 181 K/UL (ref 150–400)
POTASSIUM SERPL-SCNC: 4.2 MMOL/L (ref 3.5–5.1)
PROT SERPL-MCNC: 7.3 G/DL (ref 6.4–8.2)
PROT UR QL STRIP: NEGATIVE
RBC # BLD AUTO: 4.89 M/UL (ref 4.2–5.4)
RBC # UR STRIP: NEGATIVE /UL
SODIUM SERPL-SCNC: 136 MMOL/L (ref 136–145)
SP GR UR STRIP: 1.01
UROBILINOGEN UR STRIP-ACNC: 0.2 MG/DL
WBC # BLD AUTO: 9.21 K/UL (ref 4.5–11)

## 2022-08-20 PROCEDURE — 99284 EMERGENCY DEPT VISIT MOD MDM: CPT | Performed by: NURSE PRACTITIONER

## 2022-08-20 PROCEDURE — 81003 URINALYSIS AUTO W/O SCOPE: CPT | Performed by: NURSE PRACTITIONER

## 2022-08-20 PROCEDURE — 85025 COMPLETE CBC W/AUTO DIFF WBC: CPT | Performed by: NURSE PRACTITIONER

## 2022-08-20 PROCEDURE — 87428 SARSCOV & INF VIR A&B AG IA: CPT | Performed by: NURSE PRACTITIONER

## 2022-08-20 PROCEDURE — 96360 HYDRATION IV INFUSION INIT: CPT

## 2022-08-20 PROCEDURE — 80053 COMPREHEN METABOLIC PANEL: CPT | Performed by: NURSE PRACTITIONER

## 2022-08-20 PROCEDURE — 93010 ELECTROCARDIOGRAM REPORT: CPT | Performed by: HOSPITALIST

## 2022-08-20 PROCEDURE — 93005 ELECTROCARDIOGRAM TRACING: CPT

## 2022-08-20 PROCEDURE — 36415 COLL VENOUS BLD VENIPUNCTURE: CPT | Performed by: NURSE PRACTITIONER

## 2022-08-20 PROCEDURE — 99285 EMERGENCY DEPT VISIT HI MDM: CPT | Mod: 25,CS

## 2022-08-21 ENCOUNTER — TELEPHONE (OUTPATIENT)
Dept: EMERGENCY MEDICINE | Facility: HOSPITAL | Age: 67
End: 2022-08-21
Payer: MEDICARE

## 2022-08-21 VITALS
HEIGHT: 62 IN | DIASTOLIC BLOOD PRESSURE: 93 MMHG | TEMPERATURE: 98 F | OXYGEN SATURATION: 97 % | WEIGHT: 200 LBS | HEART RATE: 76 BPM | RESPIRATION RATE: 16 BRPM | SYSTOLIC BLOOD PRESSURE: 163 MMHG | BODY MASS INDEX: 36.8 KG/M2

## 2022-08-21 LAB
FLUAV AG UPPER RESP QL IA.RAPID: NEGATIVE
FLUBV AG UPPER RESP QL IA.RAPID: NEGATIVE
SARS-COV+SARS-COV-2 AG RESP QL IA.RAPID: NEGATIVE

## 2022-08-21 PROCEDURE — 25000003 PHARM REV CODE 250: Performed by: NURSE PRACTITIONER

## 2022-08-21 RX ADMIN — SODIUM CHLORIDE 1000 ML: 9 INJECTION, SOLUTION INTRAVENOUS at 12:08

## 2022-08-21 NOTE — ED PROVIDER NOTES
"Encounter Date: 8/20/2022       History     Chief Complaint   Patient presents with    Weakness    Shortness of Breath     67 year old  female presents to the ER with c/o of feeling "jittery", mild intermittent cough (nonproductive) and generalized weakness.  Denies chest pain, dizziness, fever, body aches.  Pt reports she thinks it could be related to a tick bite; however, she has never found a tick on her.  Reported to the nurse mild intermittent SOB but no reports to me of SOB.    The history is provided by the patient.   General Illness   The current episode started just prior to arrival. The problem occurs continuously. The problem has been gradually improving. The pain is at a severity of 0/10. Nothing relieves the symptoms. Nothing aggravates the symptoms. Associated symptoms include cough and shortness of breath. Pertinent negatives include no fever, no decreased vision, no double vision, no eye itching, no photophobia, no abdominal pain, no constipation, no diarrhea, no nausea, no vomiting, no congestion, no ear discharge, no ear pain, no headaches, no hearing loss, no mouth sores, no rhinorrhea, no sore throat, no stridor, no swollen glands, no muscle aches, no neck pain, no neck stiffness, no URI, no wheezing, no rash, no diaper rash, no discharge, no pain and no eye redness. She has received no recent medical care.     Review of patient's allergies indicates:   Allergen Reactions    Fluzone [id number] tri-whole     Sulfa (sulfonamide antibiotics)     Tamiflu [oseltamivir]      Past Medical History:   Diagnosis Date    Depressive disorder     Diabetes     GERD (gastroesophageal reflux disease)     Hypothyroidism     Trigeminal neuralgia of left side of face      Past Surgical History:   Procedure Laterality Date    BREAST BIOPSY      CARPAL TUNNEL RELEASE Left     CHOLECYSTECTOMY      HYSTERECTOMY      INGUINAL HERNIA REPAIR      TONSILLECTOMY      TYMPANOPLASTY       Family " History   Problem Relation Age of Onset    Heart disease Mother     Hypertension Father     Diabetes Brother     Breast cancer Maternal Grandmother      Social History     Tobacco Use    Smoking status: Never Smoker    Smokeless tobacco: Never Used   Substance Use Topics    Alcohol use: Never    Drug use: Never     Review of Systems   Constitutional: Negative for fever.   HENT: Negative for congestion, ear discharge, ear pain, hearing loss, mouth sores, rhinorrhea and sore throat.    Eyes: Negative for double vision, photophobia, pain, discharge, redness and itching.   Respiratory: Positive for cough and shortness of breath. Negative for wheezing and stridor.    Cardiovascular: Negative for chest pain.   Gastrointestinal: Negative for abdominal pain, constipation, diarrhea, nausea and vomiting.   Genitourinary: Negative for dysuria.   Musculoskeletal: Negative for back pain and neck pain.   Skin: Negative for rash.   Neurological: Positive for weakness. Negative for headaches.   Hematological: Does not bruise/bleed easily.       Physical Exam     Initial Vitals [08/20/22 2325]   BP Pulse Resp Temp SpO2   (!) 191/115 85 20 98.2 °F (36.8 °C) 95 %      MAP       --         Physical Exam    Nursing note and vitals reviewed.  Constitutional: She appears well-developed and well-nourished. She is not diaphoretic. She is Obese . She is cooperative.  Non-toxic appearance. She does not have a sickly appearance. She does not appear ill. No distress. She is not intubated.   HENT:   Head: Normocephalic and atraumatic.   Eyes: Pupils are equal, round, and reactive to light.   Neck: Neck supple.   Cardiovascular: Normal rate, regular rhythm, normal heart sounds and intact distal pulses. Exam reveals no gallop and no friction rub.    No murmur heard.  Pulmonary/Chest: Effort normal and breath sounds normal. No accessory muscle usage. No apnea, no tachypnea and no bradypnea. She is not intubated. No respiratory distress. She  has no decreased breath sounds. She has no wheezes. She has no rhonchi. She has no rales. She exhibits no tenderness.   Musculoskeletal:      Cervical back: Neck supple.     Neurological: She is alert and oriented to person, place, and time.   Skin: Skin is warm and dry. Capillary refill takes less than 2 seconds.   Psychiatric: She has a normal mood and affect.         Medical Screening Exam   See Full Note    ED Course   Procedures  Labs Reviewed   COMPREHENSIVE METABOLIC PANEL - Abnormal; Notable for the following components:       Result Value    Anion Gap 17 (*)     Glucose 317 (*)     BUN 23 (*)     Creatinine 1.59 (*)     Albumin 3.2 (*)     Globulin 4.1 (*)     AST 12 (*)     eGFR 35 (*)     All other components within normal limits   URINALYSIS, REFLEX TO URINE CULTURE - Abnormal; Notable for the following components:    Glucose,   (*)     All other components within normal limits   CBC WITH DIFFERENTIAL - Abnormal; Notable for the following components:    RDW 15.3 (*)     Monocytes % 8.9 (*)     Monocytes, Absolute 0.82 (*)     All other components within normal limits   SARS-COV2 (COVID) W/ FLU ANTIGEN - Normal    Narrative:     Negative SARS-CoV results should not be used as the sole basis for treatment or patient management decisions; negative results should be considered in the context of a patient's recent exposures, history and the presene of clinical signs and symptoms consistent with COVID-19.  Negative results should be treated as presumptive and confirmed by molecular assay, if necessary for patient management.   CBC W/ AUTO DIFFERENTIAL    Narrative:     The following orders were created for panel order CBC auto differential.  Procedure                               Abnormality         Status                     ---------                               -----------         ------                     CBC with Differential[[id number]]        Abnormal            Final result                  Please view results for these tests on the individual orders.     EKG Readings: (Independently Interpreted)   Initial Reading: No STEMI. Rhythm: Normal Sinus Rhythm. Heart Rate: 77. Ectopy: No Ectopy. Conduction: Normal. ST Segments: Normal ST Segments. T Waves: Normal. Clinical Impression: Normal Sinus Rhythm     ECG Results          EKG 12-lead (In process)  Result time 08/20/22 23:39:50    In process by Interface, Lab In [facility] (08/20/22 23:39:50)                 Narrative:    Test Reason : R06.02,    Vent. Rate : 077 BPM     Atrial Rate : 077 BPM     P-R Int : 140 ms          QRS Dur : 080 ms      QT Int : 380 ms       P-R-T Axes : 048 058 089 degrees     QTc Int : 430 ms    Normal sinus rhythm  Cannot rule out Anterior infarct ,age undetermined  Abnormal ECG  No previous ECGs available    Referred By:             Confirmed By:                             Imaging Results          X-Ray Chest PA And Lateral (Preliminary result)  Result time 08/21/22 01:47:34    ED Interpretation by DEBORAH Galvez (08/21/22 01:47:34, [facility] - Emergency Department, Emergency Medicine)    Chronic lung findings without superimposed focal infiltrate.  No radiographically evident acute cardiopulmonary process.                               Medications   sodium chloride 0.9% bolus 1,000 mL (1,000 mLs Intravenous New Bag 8/21/22 0045)                 ED Course as of 08/21/22 0149   Sat Aug 20, 2022   2341 BP(!): 175/106 [AG]   Sun Aug 21, 2022   0033 Creatinine(!): 1.59 [AG]   0033 BUN(!): 23 [AG]   0033 Glucose(!): 317 [AG]   0033 Anion Gap(!): 17 [AG]   0147 Hydrated well with IVFs.  Will send home to see PCP [AG]      ED Course User Index  [AG] DEBORAH Galvez          Clinical Impression:   Final diagnoses:  [R06.02] Shortness of breath  [R05.9] Cough  [E86.0] Mild dehydration (Primary)  [R73.9] Hyperglycemia          ED Disposition Condition    Discharge Stable        ED Prescriptions     None         Follow-up Information     Follow up With Specialties Details Why Contact Info    Robert Gallegos MD Family Medicine Schedule an appointment as soon as possible for a visit in 3 days As needed, If symptoms worsen [address] [facility]  [facility] [place] [address]  [phone]             Lindsay Simpson, DEBORAH  08/21/22 0149

## 2022-08-21 NOTE — DISCHARGE INSTRUCTIONS
Drink plenty of water for hydration.  Continue your home meds.  Follow-up with your family doctor on Monday.  Return for worsening of your symptoms.

## 2022-08-21 NOTE — ED TRIAGE NOTES
"Presents to ER with c/o generalized weakness, shaking, and some shortness of breath that started an hour or so prior to arrival. Said she had a tick bite in June and feels some fatigue. States tonight she just felt so weak that she could hardly move. Has a lost of energy and states " I just don't feel good."  "

## 2022-08-25 ENCOUNTER — OFFICE VISIT (OUTPATIENT)
Dept: FAMILY MEDICINE | Facility: CLINIC | Age: 67
End: 2022-08-25
Payer: MEDICARE

## 2022-08-25 VITALS
BODY MASS INDEX: 35.51 KG/M2 | SYSTOLIC BLOOD PRESSURE: 150 MMHG | HEART RATE: 75 BPM | WEIGHT: 193 LBS | DIASTOLIC BLOOD PRESSURE: 81 MMHG | HEIGHT: 62 IN

## 2022-08-25 DIAGNOSIS — I10 ESSENTIAL HYPERTENSION: Primary | ICD-10-CM

## 2022-08-25 DIAGNOSIS — N18.32 STAGE 3B CHRONIC KIDNEY DISEASE: ICD-10-CM

## 2022-08-25 PROCEDURE — 99214 OFFICE O/P EST MOD 30 MIN: CPT | Mod: ,,, | Performed by: FAMILY MEDICINE

## 2022-08-25 PROCEDURE — 99214 PR OFFICE/OUTPT VISIT, EST, LEVL IV, 30-39 MIN: ICD-10-PCS | Mod: ,,, | Performed by: FAMILY MEDICINE

## 2022-08-25 RX ORDER — AMLODIPINE BESYLATE 10 MG/1
10 TABLET ORAL DAILY
Qty: 30 TABLET | Refills: 11 | Status: SHIPPED | OUTPATIENT
Start: 2022-08-25 | End: 2023-07-18 | Stop reason: SDUPTHER

## 2022-08-25 NOTE — PROGRESS NOTES
Cheko Bonner IV, DO  [facility]  [address] [place] [address]  Phone: [phone]      Subjective     Name: Rosalva Lopez   Sex: female  YOB: 1955 (67 y.o.)  MRN: [id number]  Visit Date: 08/25/2022   Chief Complaint: Follow-up (Recent ED visit)        HISTORY OF PRESENT ILLNESS:    Patient presents with a chief complaint of hypertension.  Thorough chart review and interpretation of previous labs by other providers was performed.  Blood pressure noted to be 150/81 in the clinic today.  She is only on lisinopril for hypertension and I will add amlodipine 10 mg p.o. q.d. amlodipine will need no adjustment for patient's chronic kidney disease noted to be stage IIIB with a GFR of 35 on most recent labs.  Of note patient's A1c is still greater than 10 she is on 2 oral medications for this.  After a long discussion with the patient she is going to consider starting insulin therapy.  American Diabetes Association recommendations A1c greater than 10 require insulin this patient may benefit from 70 30 as it is she however if Medicare will cover basal bolus patient seems to be capable of learning this regimen and this would be most beneficial.  If GFR drops below 30 should consider referral to Nephrology who will tell the patient she needs to control her blood pressure and her blood sugar.  These are the 2 most common cause in the [place] of kidney failure.  Patient has multiple other concerns not all of which could be addressed today.  She does have a wellness visit in October and I have advised her to make a follow-up appointment with Dr. Gallegos afterwards.        PAST MEDICAL HISTORY:  Significant Diagnoses - Patient  has a past medical history of Depressive disorder, Diabetes, GERD (gastroesophageal reflux disease), Hypothyroidism, and Trigeminal neuralgia of left side of face.  Medications - Patient has a current medication list which includes the  following long-term medication(s): albuterol, aspirin, diclofenac sodium, estradiol, gabapentin, glipizide, levothyroxine, lisinopril, lorazepam, pantoprazole, pravastatin, trazodone, valproic acid, and amlodipine.   Allergies - Patient is allergic to fluzone [id number] tri-whole, sulfa (sulfonamide antibiotics), and tamiflu [oseltamivir].  Surgeries - Patient  has a past surgical history that includes Cholecystectomy; Tonsillectomy; Inguinal hernia repair; Tympanoplasty; Carpal tunnel release (Left); Breast biopsy; and Hysterectomy.  Family History - Patient family history includes Breast cancer in her maternal grandmother; Diabetes in her brother; Heart disease in her mother; Hypertension in her father.      SOCIAL HISTORY:  Tobacco - Patient  reports that she has never smoked. She has never used smokeless tobacco.   Alcohol - Patient  reports no history of alcohol use.   Recreational Drugs - Patient  reports no history of drug use.       Review of Systems   Constitutional: Negative for fatigue and fever.   HENT: Negative for nasal congestion and sore throat.    Respiratory: Negative for cough and shortness of breath.    Cardiovascular: Negative for chest pain.   Gastrointestinal: Negative for abdominal pain, nausea and vomiting.   Genitourinary: Negative for dysuria.   Musculoskeletal: Negative for myalgias.   Integumentary:  Negative for rash.   Neurological: Negative for dizziness, weakness and headaches.          Past Medical History:   Diagnosis Date    Depressive disorder     Diabetes     GERD (gastroesophageal reflux disease)     Hypothyroidism     Trigeminal neuralgia of left side of face         Review of patient's allergies indicates:   Allergen Reactions    Fluzone [id number] tri-whole     Sulfa (sulfonamide antibiotics)     Tamiflu [oseltamivir]         Past Surgical History:   Procedure Laterality Date    BREAST BIOPSY      CARPAL TUNNEL RELEASE Left     CHOLECYSTECTOMY      HYSTERECTOMY    "   INGUINAL HERNIA REPAIR      TONSILLECTOMY      TYMPANOPLASTY          Family History   Problem Relation Age of Onset    Heart disease Mother     Hypertension Father     Diabetes Brother     Breast cancer Maternal Grandmother        Current Outpatient Medications   Medication Instructions    albuterol (VENTOLIN HFA) 90 mcg/actuation inhaler 2 puffs, Inhalation, Every 6 hours PRN, Rescue    amLODIPine (NORVASC) 10 mg, Oral, Daily    aspirin (ECOTRIN) 81 mg, Oral, Daily    diclofenac sodium (VOLTAREN) 2 g, Topical (Top), 4 times daily    ergocalciferol (ERGOCALCIFEROL) 5,000 Units, Oral, Daily    estradioL (ESTRACE) 1 mg, Oral, Daily    gabapentin (NEURONTIN) 100 MG capsule Take two capsules by mouth twice daily.    glipiZIDE (GLUCOTROL) 5 mg, Oral, 2 times daily with meals    levothyroxine (SYNTHROID) 25 mcg, Oral, Daily    lisinopriL (PRINIVIL,ZESTRIL) 20 mg, Oral, Daily    LORazepam (ATIVAN) 0.5 mg, Oral, Every 6 hours PRN    meloxicam (MOBIC) 7.5 mg, Oral, Daily, May increase to twice daily during pain flare    pantoprazole (PROTONIX) 40 mg, Oral, Daily    pravastatin (PRAVACHOL) 40 mg, Oral, Nightly    traZODone (DESYREL) 50 mg, Oral, Daily    valproic acid (DEPAKENE) 250 mg, Oral, 2 times daily        Objective     BP (!) 150/81   Pulse 75   Ht 5' 2" (1.575 m)   Wt 87.5 kg (193 lb)   BMI 35.30 kg/m²     Physical Exam  Constitutional:       General: She is not in acute distress.     Appearance: Normal appearance. She is not ill-appearing.   HENT:      Head: Normocephalic and atraumatic.      Right Ear: External ear normal.      Left Ear: External ear normal.   Eyes:      Extraocular Movements: Extraocular movements intact.      Conjunctiva/sclera: Conjunctivae normal.   Cardiovascular:      Rate and Rhythm: Normal rate.      Heart sounds: No murmur heard.  Pulmonary:      Effort: Pulmonary effort is normal.   Musculoskeletal:      Cervical back: Normal range of motion.   Skin:     " General: Skin is warm and dry.      Coloration: Skin is not jaundiced.      Findings: No rash.   Neurological:      Mental Status: She is alert.   Psychiatric:         Mood and Affect: Mood normal.          All recently obtained labs have been reviewed and discussed in detail with the patient.   Assessment     1. Essential hypertension    2. Stage 3b chronic kidney disease         Plan        Problem List Items Addressed This Visit        Cardiac/Vascular    Essential hypertension - Primary    Relevant Medications    amLODIPine (NORVASC) 10 MG tablet      Other Visit Diagnoses     Stage 3b chronic kidney disease              No follow-ups on file.    Cheko Bonner DO  [facility]

## 2022-08-31 ENCOUNTER — EXTERNAL CHRONIC CARE MANAGEMENT (OUTPATIENT)
Dept: FAMILY MEDICINE | Facility: CLINIC | Age: 67
End: 2022-08-31
Payer: MEDICARE

## 2022-08-31 PROCEDURE — G0511 CCM/BHI BY RHC/FQHC 20MIN MO: HCPCS | Mod: ,,, | Performed by: FAMILY MEDICINE

## 2022-08-31 PROCEDURE — G0511 PR CHRONIC CARE MGMT, RHC OR FQHC ONLY, 20 MINS OR MORE: ICD-10-PCS | Mod: ,,, | Performed by: FAMILY MEDICINE

## 2022-09-08 ENCOUNTER — OFFICE VISIT (OUTPATIENT)
Dept: FAMILY MEDICINE | Facility: CLINIC | Age: 67
End: 2022-09-08
Payer: MEDICARE

## 2022-09-08 VITALS
OXYGEN SATURATION: 96 % | HEART RATE: 82 BPM | BODY MASS INDEX: 35.51 KG/M2 | WEIGHT: 193 LBS | SYSTOLIC BLOOD PRESSURE: 138 MMHG | HEIGHT: 62 IN | DIASTOLIC BLOOD PRESSURE: 86 MMHG

## 2022-09-08 DIAGNOSIS — M25.561 CHRONIC PAIN OF BOTH KNEES: Chronic | ICD-10-CM

## 2022-09-08 DIAGNOSIS — M79.10 MUSCLE PAIN: ICD-10-CM

## 2022-09-08 DIAGNOSIS — G89.29 CHRONIC PAIN OF BOTH KNEES: Chronic | ICD-10-CM

## 2022-09-08 DIAGNOSIS — G89.29 CHRONIC LOW BACK PAIN, UNSPECIFIED BACK PAIN LATERALITY, UNSPECIFIED WHETHER SCIATICA PRESENT: Chronic | ICD-10-CM

## 2022-09-08 DIAGNOSIS — M25.562 CHRONIC PAIN OF BOTH KNEES: Chronic | ICD-10-CM

## 2022-09-08 DIAGNOSIS — M54.6 ACUTE LEFT-SIDED THORACIC BACK PAIN: ICD-10-CM

## 2022-09-08 DIAGNOSIS — E78.2 MIXED HYPERLIPIDEMIA: Chronic | ICD-10-CM

## 2022-09-08 DIAGNOSIS — M54.50 CHRONIC LOW BACK PAIN, UNSPECIFIED BACK PAIN LATERALITY, UNSPECIFIED WHETHER SCIATICA PRESENT: Chronic | ICD-10-CM

## 2022-09-08 DIAGNOSIS — E11.40 TYPE 2 DIABETES MELLITUS WITH DIABETIC NEUROPATHY, WITHOUT LONG-TERM CURRENT USE OF INSULIN: Primary | Chronic | ICD-10-CM

## 2022-09-08 PROCEDURE — 99214 OFFICE O/P EST MOD 30 MIN: CPT | Mod: ,,, | Performed by: FAMILY MEDICINE

## 2022-09-08 PROCEDURE — 99214 PR OFFICE/OUTPT VISIT, EST, LEVL IV, 30-39 MIN: ICD-10-PCS | Mod: ,,, | Performed by: FAMILY MEDICINE

## 2022-09-09 RX ORDER — PRAVASTATIN SODIUM 40 MG/1
40 TABLET ORAL NIGHTLY
Qty: 90 TABLET | Refills: 2 | Status: SHIPPED | OUTPATIENT
Start: 2022-09-09 | End: 2023-05-24 | Stop reason: SDUPTHER

## 2022-09-09 RX ORDER — DICLOFENAC SODIUM 10 MG/G
2 GEL TOPICAL 4 TIMES DAILY
Qty: 100 G | Refills: 0 | Status: CANCELLED
Start: 2022-09-09

## 2022-09-09 RX ORDER — MELOXICAM 7.5 MG/1
7.5 TABLET ORAL DAILY
Qty: 90 TABLET | Refills: 2 | Status: CANCELLED | OUTPATIENT
Start: 2022-09-09

## 2022-09-09 RX ORDER — VALPROIC ACID 250 MG/1
250 CAPSULE, LIQUID FILLED ORAL 2 TIMES DAILY
Qty: 180 CAPSULE | Refills: 2 | Status: SHIPPED | OUTPATIENT
Start: 2022-09-09 | End: 2024-02-29 | Stop reason: SDUPTHER

## 2022-09-09 RX ORDER — TIZANIDINE 4 MG/1
4 TABLET ORAL EVERY 8 HOURS
Qty: 60 TABLET | Refills: 1 | Status: SHIPPED | OUTPATIENT
Start: 2022-09-09 | End: 2022-10-21 | Stop reason: ALTCHOICE

## 2022-09-12 RX ORDER — SEMAGLUTIDE 1.34 MG/ML
0.25 INJECTION, SOLUTION SUBCUTANEOUS
Qty: 1 PEN | Refills: 0
Start: 2022-09-12 | End: 2022-11-18 | Stop reason: ALTCHOICE

## 2022-09-15 ENCOUNTER — OFFICE VISIT (OUTPATIENT)
Dept: FAMILY MEDICINE | Facility: CLINIC | Age: 67
End: 2022-09-15
Payer: MEDICARE

## 2022-09-15 VITALS — SYSTOLIC BLOOD PRESSURE: 132 MMHG | DIASTOLIC BLOOD PRESSURE: 84 MMHG | HEART RATE: 84 BPM

## 2022-09-15 DIAGNOSIS — E78.2 MIXED HYPERLIPIDEMIA: Primary | Chronic | ICD-10-CM

## 2022-09-15 DIAGNOSIS — E03.9 ACQUIRED HYPOTHYROIDISM: Chronic | ICD-10-CM

## 2022-09-15 DIAGNOSIS — I10 ESSENTIAL HYPERTENSION: ICD-10-CM

## 2022-09-15 DIAGNOSIS — E11.40 TYPE 2 DIABETES MELLITUS WITH DIABETIC NEUROPATHY, WITHOUT LONG-TERM CURRENT USE OF INSULIN: Chronic | ICD-10-CM

## 2022-09-15 PROCEDURE — 99214 PR OFFICE/OUTPT VISIT, EST, LEVL IV, 30-39 MIN: ICD-10-PCS | Mod: ,,, | Performed by: FAMILY MEDICINE

## 2022-09-15 PROCEDURE — 99214 OFFICE O/P EST MOD 30 MIN: CPT | Mod: ,,, | Performed by: FAMILY MEDICINE

## 2022-09-15 PROCEDURE — 84443 ASSAY THYROID STIM HORMONE: CPT | Mod: ,,, | Performed by: CLINICAL MEDICAL LABORATORY

## 2022-09-15 PROCEDURE — 82533 CORTISOL, RANDOM: ICD-10-PCS | Mod: ,,, | Performed by: CLINICAL MEDICAL LABORATORY

## 2022-09-15 PROCEDURE — 80048 BASIC METABOLIC PNL TOTAL CA: CPT | Mod: ,,, | Performed by: CLINICAL MEDICAL LABORATORY

## 2022-09-15 PROCEDURE — 82533 TOTAL CORTISOL: CPT | Mod: ,,, | Performed by: CLINICAL MEDICAL LABORATORY

## 2022-09-15 PROCEDURE — 80061 LIPID PANEL: ICD-10-PCS | Mod: ,,, | Performed by: CLINICAL MEDICAL LABORATORY

## 2022-09-15 PROCEDURE — 84443 TSH: ICD-10-PCS | Mod: ,,, | Performed by: CLINICAL MEDICAL LABORATORY

## 2022-09-15 PROCEDURE — 80061 LIPID PANEL: CPT | Mod: ,,, | Performed by: CLINICAL MEDICAL LABORATORY

## 2022-09-15 PROCEDURE — 80048 BASIC METABOLIC PANEL: ICD-10-PCS | Mod: ,,, | Performed by: CLINICAL MEDICAL LABORATORY

## 2022-09-16 LAB
ANION GAP SERPL CALCULATED.3IONS-SCNC: 14 MMOL/L (ref 7–16)
BUN SERPL-MCNC: 21 MG/DL (ref 7–18)
BUN/CREAT SERPL: 14 (ref 6–20)
CALCIUM SERPL-MCNC: 9.7 MG/DL (ref 8.5–10.1)
CHLORIDE SERPL-SCNC: 100 MMOL/L (ref 98–107)
CHOLEST SERPL-MCNC: 159 MG/DL (ref 0–200)
CHOLEST/HDLC SERPL: 5 {RATIO}
CO2 SERPL-SCNC: 28 MMOL/L (ref 21–32)
CORTIS SERPL-MCNC: 12.8 ΜG/DL
CREAT SERPL-MCNC: 1.47 MG/DL (ref 0.55–1.02)
EGFR (NO RACE VARIABLE) (RUSH/TITUS): 39 ML/MIN/1.73M²
GLUCOSE SERPL-MCNC: 259 MG/DL (ref 74–106)
HDLC SERPL-MCNC: 32 MG/DL (ref 40–60)
LDLC SERPL CALC-MCNC: 51 MG/DL
LDLC/HDLC SERPL: 1.6 {RATIO}
NONHDLC SERPL-MCNC: 127 MG/DL
POTASSIUM SERPL-SCNC: 4.5 MMOL/L (ref 3.5–5.1)
SODIUM SERPL-SCNC: 137 MMOL/L (ref 136–145)
TRIGL SERPL-MCNC: 382 MG/DL (ref 35–150)
TSH SERPL DL<=0.005 MIU/L-ACNC: 5.03 UIU/ML (ref 0.36–3.74)
VLDLC SERPL-MCNC: 76 MG/DL

## 2022-09-19 NOTE — PROGRESS NOTES
Robert Gallegos MD   [facility]  MEDICAL GROUP [facility] FAM[facility] MED[facility]  [address] [place] [id number]  [phone]      PATIENT NAME: Rosalva Lopez  : 1955  DATE: 9/15/22  MRN: [id number]      Billing Provider: Robert Gallegos MD  Level of Service:   Patient PCP Information       Provider PCP Type    Robert Gallegos MD General            Reason for Visit / Chief Complaint: Hyperglycemia (Complains of blood sugar averaging in the low to mid 200's), Foot Swelling (Complains of left foot swelling. ), and Tremors       Update PCP  Update Chief Complaint         History of Present Illness / Problem Focused Workflow     Rosalva Lopez presents to the clinic with Hyperglycemia (Complains of blood sugar averaging in the low to mid 200's), Foot Swelling (Complains of left foot swelling. ), and Tremors       68 yo WF who was started on ozempic just a few days ago.  She states that glucose is better than previous but still elevated.  BP is doing much better.  She is trying to comply with diabetic diet.  She had admitted to eating a lot of sweets and white bread.    Review of Systems     Review of Systems   Constitutional:  Negative for activity change, chills and fever.   HENT:  Negative for sore throat.    Eyes:  Negative for pain.   Respiratory:  Negative for cough, chest tightness and shortness of breath.    Cardiovascular:  Negative for chest pain and palpitations.   Gastrointestinal:  Negative for abdominal pain.   Musculoskeletal:  Positive for leg pain.   Neurological:  Negative for dizziness, syncope and weakness.   Psychiatric/Behavioral:  Negative for confusion.       Medical / Social / Family History     Past Medical History:   Diagnosis Date    Depressive disorder     Diabetes     GERD (gastroesophageal reflux disease)     Hypothyroidism     Trigeminal neuralgia of left side of face        Past Surgical History:   Procedure Laterality Date    BREAST BIOPSY       CARPAL TUNNEL RELEASE Left     CHOLECYSTECTOMY      HYSTERECTOMY      INGUINAL HERNIA REPAIR      TONSILLECTOMY      TYMPANOPLASTY         Social History    reports that she has never smoked. She has never used smokeless tobacco. She reports that she does not drink alcohol and does not use drugs.   Social History     Tobacco Use    Smoking status: Never    Smokeless tobacco: Never   Substance Use Topics    Alcohol use: Never    Drug use: Never       Family History  Family History   Problem Relation Age of Onset    Heart disease Mother     Hypertension Father     Diabetes Brother     Breast cancer Maternal Grandmother        Medications and Allergies     Medications  Outpatient Medications Marked as Taking for the 9/15/22 encounter (Office Visit) with Robert Gallegos MD   Medication Sig Dispense Refill    albuterol (VENTOLIN HFA) 90 mcg/actuation inhaler Inhale 2 puffs into the lungs every 6 (six) hours as needed for Wheezing or Shortness of Breath. Rescue 8 g 0    amLODIPine (NORVASC) 10 MG tablet Take 1 tablet (10 mg total) by mouth once daily. 30 tablet 11    aspirin (ECOTRIN) 81 MG EC tablet Take 81 mg by mouth once daily.      diclofenac sodium (VOLTAREN) 1 % Gel Apply 2 g topically 4 (four) times daily. 100 g 0    ergocalciferol (ERGOCALCIFEROL) 50,000 unit Cap Take 5,000 Units by mouth once daily.      estradioL (ESTRACE) 1 MG tablet Take 1 tablet (1 mg total) by mouth once daily. 90 tablet 1    gabapentin (NEURONTIN) 100 MG capsule Take two capsules by mouth twice daily. 360 capsule 1    glipiZIDE (GLUCOTROL) 5 MG tablet Take 1 tablet (5 mg total) by mouth 2 (two) times daily with meals. 180 tablet 2    levothyroxine (SYNTHROID) 25 MCG tablet Take 1 tablet (25 mcg total) by mouth once daily. 90 tablet 1    lisinopriL (PRINIVIL,ZESTRIL) 20 MG tablet Take 1 tablet (20 mg total) by mouth once daily. 90 tablet 1    LORazepam (ATIVAN) 0.5 MG tablet Take 0.5 mg by mouth every 6 (six) hours as needed for  Anxiety.      pantoprazole (PROTONIX) 40 MG tablet Take 1 tablet (40 mg total) by mouth once daily. 90 tablet 1    pravastatin (PRAVACHOL) 40 MG tablet Take 1 tablet (40 mg total) by mouth every evening. 90 tablet 2    semaglutide (OZEMPIC) 0.25 mg or 0.5 mg(2 mg/1.5 mL) pen injector Inject 0.25 mg into the skin every 7 days. 1 pen 0    tiZANidine (ZANAFLEX) 4 MG tablet Take 1 tablet (4 mg total) by mouth every 8 (eight) hours. Muscle pain/spasm 60 tablet 1    traZODone (DESYREL) 50 MG tablet Take 1 tablet (50 mg total) by mouth once daily. 90 tablet 1    valproic acid (DEPAKENE) 250 mg capsule Take 1 capsule (250 mg total) by mouth 2 (two) times daily. 180 capsule 2       Allergies  Review of patient's allergies indicates:   Allergen Reactions    Fluzone [id number] tri-whole     Sulfa (sulfonamide antibiotics)     Tamiflu [oseltamivir]        Physical Examination     Vitals:    09/15/22 0922   BP: 132/84   Pulse: 84     Physical Exam  Vitals reviewed.   Constitutional:       Appearance: Normal appearance.   HENT:      Head: Normocephalic and atraumatic.   Eyes:      Extraocular Movements: Extraocular movements intact.      Conjunctiva/sclera: Conjunctivae normal.      Pupils: Pupils are equal, round, and reactive to light.   Cardiovascular:      Rate and Rhythm: Normal rate and regular rhythm.      Heart sounds: Normal heart sounds.   Pulmonary:      Effort: Pulmonary effort is normal.      Breath sounds: Normal breath sounds.   Musculoskeletal:         General: Normal range of motion.      Cervical back: Normal range of motion.      Right lower leg: No edema (no edema seen).      Left lower leg: No edema (no edema seen).   Skin:     General: Skin is warm and dry.   Neurological:      General: No focal deficit present.      Mental Status: She is alert and oriented to person, place, and time.      Gait: Gait normal.      Comments: No visible tremors   Psychiatric:         Mood and Affect: Mood normal.          Behavior: Behavior normal.      Office Visit on 09/15/2022   Component Date Value Ref Range Status    Triglycerides 09/15/2022 382 (H)  35 - 150 mg/dL Final      Normal:  <150 mg/dL  Borderline High: 150-199 mg/dL  High:   200-499 mg/dL  Very High:  >=500    Cholesterol 09/15/2022 159  0 - 200 mg/dL Final      <200 mg/dL:  Desirable  200-240 mg/dL: Borderline High  >240 mg/dL:  High    HDL Cholesterol 09/15/2022 32 (L)  40 - 60 mg/dL Final      <40 mg/dL: Low HDL  40-60 mg/dL: Normal  >60 mg/dL: Desirable    Cholesterol/HDL Ratio (Risk Factor) 09/15/2022 5.0   Final    Non-HDL 09/15/2022 127  mg/dL Final    LDL Calculated 09/15/2022 51  mg/dL Final    Unable to calculate due to one of the following values:  Cholesterol <5  HDL Cholesterol <5  Triglycerides <10 or >400    LDL/HDL 09/15/2022 1.6   Final    Unable to calculate due to one of the following values:  Cholesterol <5  HDL Cholesterol <5  Triglycerides <10 or >400    VLDL 09/15/2022 76  mg/dL Final    Cortisol 09/15/2022 12.8  µg/dL Final      Mornin.30 - 22.40 ug/dL  Afternoon:   3.09 - 16.66 ug/dL    Sodium 09/15/2022 137  136 - 145 mmol/L Final    Potassium 09/15/2022 4.5  3.5 - 5.1 mmol/L Final    Chloride 09/15/2022 100  98 - 107 mmol/L Final    CO2 09/15/2022 28  21 - 32 mmol/L Final    Anion Gap 09/15/2022 14  7 - 16 mmol/L Final    Glucose 09/15/2022 259 (H)  74 - 106 mg/dL Final    BUN 09/15/2022 21 (H)  7 - 18 mg/dL Final    Creatinine 09/15/2022 1.47 (H)  0.55 - 1.02 mg/dL Final    BUN/Creatinine Ratio 09/15/2022 14  6 - 20 Final    Calcium 09/15/2022 9.7  8.5 - 10.1 mg/dL Final    eGFR 09/15/2022 39 (L)  >=60 mL/min/1.73m² Final    TSH 09/15/2022 5.030 (H)  0.358 - 3.740 uIU/mL Final         Assessment and Plan (including Health Maintenance)      Problem List  Smart Sets  Document Outside HM   :    Plan: discussed with patient that it may take several weeks to get the full effects of ozempic.  Continue to make better dietary choices for  diabetes.  Follow up in 3 weeks.  Continue to monitor glucose daily and maintain log.  Bring log to next clinic visit.        Health Maintenance Due   Topic Date Due    Hepatitis C Screening  Never done    Diabetes Urine Screening  Never done    Foot Exam  Never done    TETANUS VACCINE  Never done    Colorectal Cancer Screening  Never done    Shingles Vaccine (1 of 2) Never done    Eye Exam  12/04/2021    Hemoglobin A1c  08/13/2022    Influenza Vaccine (1) Never done       Problem List Items Addressed This Visit          Cardiac/Vascular    Mixed hyperlipidemia - Primary (Chronic)    Relevant Orders    Lipid Panel (Completed)    Cortisol (Completed)    Essential hypertension    Relevant Orders    Cortisol (Completed)    Basic Metabolic Panel (Completed)       Endocrine    Type 2 diabetes mellitus with diabetic neuropathy, without long-term current use of insulin (Chronic)    Relevant Orders    Cortisol (Completed)    Basic Metabolic Panel (Completed)    Acquired hypothyroidism (Chronic)    Relevant Orders    Cortisol (Completed)    TSH (Completed)       Health Maintenance Topics with due status: Not Due       Topic Last Completion Date    DEXA Scan 07/12/2021    Mammogram 07/18/2022    Lipid Panel 09/15/2022       Future Appointments   Date Time Provider Department Center   10/7/2022  2:30 PM Robert Gallegos MD [facility]   10/21/2022 10:30 AM [facility], [facility]   7/24/2023  8:15 AM [facility]            Signature:  MD ABELARDO Murcia [facility]  [facility] [place] - FAMILY MEDIC[facility]  [address] [place] [id number]  [phone]    Date of encounter: 9/15/22

## 2022-09-30 ENCOUNTER — OFFICE VISIT (OUTPATIENT)
Dept: FAMILY MEDICINE | Facility: CLINIC | Age: 67
End: 2022-09-30
Payer: MEDICARE

## 2022-09-30 ENCOUNTER — EXTERNAL CHRONIC CARE MANAGEMENT (OUTPATIENT)
Dept: FAMILY MEDICINE | Facility: CLINIC | Age: 67
End: 2022-09-30
Payer: MEDICARE

## 2022-09-30 VITALS
WEIGHT: 187 LBS | BODY MASS INDEX: 34.41 KG/M2 | SYSTOLIC BLOOD PRESSURE: 122 MMHG | HEART RATE: 76 BPM | DIASTOLIC BLOOD PRESSURE: 67 MMHG | HEIGHT: 62 IN

## 2022-09-30 DIAGNOSIS — Z76.89 PERSONS ENCOUNTERING HEALTH SERVICES IN OTHER SPECIFIED CIRCUMSTANCES: ICD-10-CM

## 2022-09-30 DIAGNOSIS — E11.40 TYPE 2 DIABETES MELLITUS WITH DIABETIC NEUROPATHY, WITHOUT LONG-TERM CURRENT USE OF INSULIN: Chronic | ICD-10-CM

## 2022-09-30 DIAGNOSIS — E78.2 MIXED HYPERLIPIDEMIA: Chronic | ICD-10-CM

## 2022-09-30 DIAGNOSIS — E03.9 ACQUIRED HYPOTHYROIDISM: Primary | Chronic | ICD-10-CM

## 2022-09-30 PROCEDURE — G0511 CCM/BHI BY RHC/FQHC 20MIN MO: HCPCS | Mod: ,,, | Performed by: FAMILY MEDICINE

## 2022-09-30 PROCEDURE — G0511 PR CHRONIC CARE MGMT, RHC OR FQHC ONLY, 20 MINS OR MORE: ICD-10-PCS | Mod: ,,, | Performed by: FAMILY MEDICINE

## 2022-09-30 PROCEDURE — 99214 OFFICE O/P EST MOD 30 MIN: CPT | Mod: ,,, | Performed by: FAMILY MEDICINE

## 2022-09-30 PROCEDURE — 99214 PR OFFICE/OUTPT VISIT, EST, LEVL IV, 30-39 MIN: ICD-10-PCS | Mod: ,,, | Performed by: FAMILY MEDICINE

## 2022-09-30 RX ORDER — LEVOTHYROXINE SODIUM 50 UG/1
50 TABLET ORAL
Qty: 90 TABLET | Refills: 2 | Status: SHIPPED | OUTPATIENT
Start: 2022-09-30 | End: 2023-07-18 | Stop reason: SDUPTHER

## 2022-09-30 NOTE — PATIENT INSTRUCTIONS
Levothyroxine 25 mcg.  Take one tablet in morning on empty stomach Tuesdays, Thursdays, Saturdays and Sundays.  Take 2 tablets in morning on Mondays, Wednesdays and Fridays.  When yo run out of 25 mcg tablets start new prescription at higher strength one tablet every day.

## 2022-09-30 NOTE — PROGRESS NOTES
Robert Gallegos MD   [facility]  MEDICAL GROUP [facility] FAMILY [address] [address]  [phone]      PATIENT NAME: Rosalva Lopez  : 1955  DATE: 22  MRN: [id number]      Billing Provider: Robert Gallegos MD  Level of Service:   Patient PCP Information       Provider PCP Type    Robert Gallegos MD General            Reason for Visit / Chief Complaint: Follow-up and Fatigue       Update PCP  Update Chief Complaint         History of Present Illness / Problem Focused Workflow     Rosalva Lopez presents to the clinic with Follow-up and Fatigue       Has been feeling  fatigued recently.  Wants to review recent labs.  BP has been well controlled and glucose is much lower since has been on ozempic for a couple of weeks.  Is now running in 120-130s usually.  Has lost 6 pounds.  Is eating a healthier diet.      Review of Systems     Review of Systems   Constitutional:  Positive for appetite change (not as hungry), fatigue and unexpected weight change (6 lb weight loss (since started ozempic and changed diet)). Negative for activity change, chills and fever.   HENT:  Negative for sore throat.    Eyes:  Negative for pain.   Respiratory:  Negative for cough, chest tightness and shortness of breath.    Cardiovascular:  Negative for chest pain and palpitations.   Gastrointestinal:  Negative for abdominal pain.   Neurological:  Negative for dizziness, syncope and weakness.   Psychiatric/Behavioral:  Negative for confusion.       Medical / Social / Family History     Past Medical History:   Diagnosis Date    Depressive disorder     Diabetes     GERD (gastroesophageal reflux disease)     Hypothyroidism     Trigeminal neuralgia of left side of face        Past Surgical History:   Procedure Laterality Date    BREAST BIOPSY      CARPAL TUNNEL RELEASE Left     CHOLECYSTECTOMY      HYSTERECTOMY      INGUINAL HERNIA REPAIR      TONSILLECTOMY       TYMPANOPLASTY         Social History    reports that she has never smoked. She has never used smokeless tobacco. She reports that she does not drink alcohol and does not use drugs.   Social History     Tobacco Use    Smoking status: Never    Smokeless tobacco: Never   Substance Use Topics    Alcohol use: Never    Drug use: Never       Family History  Family History   Problem Relation Age of Onset    Heart disease Mother     Hypertension Father     Diabetes Brother     Breast cancer Maternal Grandmother        Medications and Allergies     Medications  No outpatient medications have been marked as taking for the 9/30/22 encounter (Office Visit) with Robert Gallegos MD.       Allergies  Review of patient's allergies indicates:   Allergen Reactions    Fluzone [id number] tri-whole     Sulfa (sulfonamide antibiotics)     Tamiflu [oseltamivir]        Physical Examination     Vitals:    09/30/22 0951   BP: 122/67   Pulse: 76     Physical Exam  Vitals reviewed.   Constitutional:       Appearance: Normal appearance.   HENT:      Head: Normocephalic and atraumatic.   Eyes:      Extraocular Movements: Extraocular movements intact.      Conjunctiva/sclera: Conjunctivae normal.      Pupils: Pupils are equal, round, and reactive to light.   Cardiovascular:      Rate and Rhythm: Normal rate and regular rhythm.      Heart sounds: Normal heart sounds.   Pulmonary:      Effort: Pulmonary effort is normal.      Breath sounds: Normal breath sounds.   Musculoskeletal:         General: Normal range of motion.      Cervical back: Normal range of motion.   Skin:     General: Skin is warm and dry.   Neurological:      General: No focal deficit present.      Mental Status: She is alert and oriented to person, place, and time.   Psychiatric:         Mood and Affect: Mood normal.         Behavior: Behavior normal.      No visits with results within 2 Week(s) from this visit.   Latest known visit with results is:   Office Visit on 09/15/2022    Component Date Value Ref Range Status    Triglycerides 09/15/2022 382 (H)  35 - 150 mg/dL Final      Normal:  <150 mg/dL  Borderline High: 150-199 mg/dL  High:   200-499 mg/dL  Very High:  >=500    Cholesterol 09/15/2022 159  0 - 200 mg/dL Final      <200 mg/dL:  Desirable  200-240 mg/dL: Borderline High  >240 mg/dL:  High    HDL Cholesterol 09/15/2022 32 (L)  40 - 60 mg/dL Final      <40 mg/dL: Low HDL  40-60 mg/dL: Normal  >60 mg/dL: Desirable    Cholesterol/HDL Ratio (Risk Factor) 09/15/2022 5.0   Final    Non-HDL 09/15/2022 127  mg/dL Final    LDL Calculated 09/15/2022 51  mg/dL Final    Unable to calculate due to one of the following values:  Cholesterol <5  HDL Cholesterol <5  Triglycerides <10 or >400    LDL/HDL 09/15/2022 1.6   Final    Unable to calculate due to one of the following values:  Cholesterol <5  HDL Cholesterol <5  Triglycerides <10 or >400    VLDL 09/15/2022 76  mg/dL Final    Cortisol 09/15/2022 12.8  µg/dL Final      Mornin.30 - 22.40 ug/dL  Afternoon:   3.09 - 16.66 ug/dL    Sodium 09/15/2022 137  136 - 145 mmol/L Final    Potassium 09/15/2022 4.5  3.5 - 5.1 mmol/L Final    Chloride 09/15/2022 100  98 - 107 mmol/L Final    CO2 09/15/2022 28  21 - 32 mmol/L Final    Anion Gap 09/15/2022 14  7 - 16 mmol/L Final    Glucose 09/15/2022 259 (H)  74 - 106 mg/dL Final    BUN 09/15/2022 21 (H)  7 - 18 mg/dL Final    Creatinine 09/15/2022 1.47 (H)  0.55 - 1.02 mg/dL Final    BUN/Creatinine Ratio 09/15/2022 14  6 - 20 Final    Calcium 09/15/2022 9.7  8.5 - 10.1 mg/dL Final    eGFR 09/15/2022 39 (L)  >=60 mL/min/1.73m² Final    TSH 09/15/2022 5.030 (H)  0.358 - 3.740 uIU/mL Final         Assessment and Plan (including Health Maintenance)      Problem List  Smart Sets  Document Outside HM   :    Plan: increase thyroid med dosage as instructed.  Fatigue could be due to underactive thyroid.  Continue all other current care.  Continue healthy diet.        Health Maintenance Due   Topic Date Due     Hepatitis C Screening  Never done    Diabetes Urine Screening  Never done    Foot Exam  Never done    TETANUS VACCINE  Never done    Colorectal Cancer Screening  Never done    Shingles Vaccine (1 of 2) Never done    Eye Exam  12/04/2021    Hemoglobin A1c  08/13/2022    Influenza Vaccine (1) Never done       Problem List Items Addressed This Visit          Endocrine    Acquired hypothyroidism - Primary (Chronic)    Relevant Medications    levothyroxine (SYNTHROID) 50 MCG tablet       Health Maintenance Topics with due status: Not Due       Topic Last Completion Date    DEXA Scan 07/12/2021    Mammogram 07/18/2022    Lipid Panel 09/15/2022       Future Appointments   Date Time Provider Department Center   10/7/2022  2:30 PM Robert Gallegos MD [facility]   10/21/2022 10:30 AM [facility] NURSE, [facility] FAMILY MEDICINE [facility]   7/24/2023  8:15 AM [facility]            Signature:  Robert Gallegos MD  [facility]  MEDICAL GROUP [facility] - FAMILY MEDICINE  [address] [place] [address] [place] [id number]  [phone]    Date of encounter: 9/30/22

## 2022-10-07 ENCOUNTER — OFFICE VISIT (OUTPATIENT)
Dept: FAMILY MEDICINE | Facility: CLINIC | Age: 67
End: 2022-10-07
Payer: MEDICARE

## 2022-10-07 VITALS — HEART RATE: 82 BPM | DIASTOLIC BLOOD PRESSURE: 73 MMHG | SYSTOLIC BLOOD PRESSURE: 133 MMHG

## 2022-10-07 DIAGNOSIS — E11.40 TYPE 2 DIABETES MELLITUS WITH DIABETIC NEUROPATHY, WITHOUT LONG-TERM CURRENT USE OF INSULIN: Chronic | ICD-10-CM

## 2022-10-07 DIAGNOSIS — F33.41 RECURRENT MAJOR DEPRESSIVE DISORDER, IN PARTIAL REMISSION: Primary | Chronic | ICD-10-CM

## 2022-10-07 DIAGNOSIS — E03.9 ACQUIRED HYPOTHYROIDISM: Chronic | ICD-10-CM

## 2022-10-07 PROCEDURE — 99214 OFFICE O/P EST MOD 30 MIN: CPT | Mod: ,,, | Performed by: FAMILY MEDICINE

## 2022-10-07 PROCEDURE — 99214 PR OFFICE/OUTPT VISIT, EST, LEVL IV, 30-39 MIN: ICD-10-PCS | Mod: ,,, | Performed by: FAMILY MEDICINE

## 2022-10-07 RX ORDER — TRAZODONE HYDROCHLORIDE 50 MG/1
50 TABLET ORAL DAILY
Qty: 90 TABLET | Refills: 1 | Status: SHIPPED | OUTPATIENT
Start: 2022-10-07 | End: 2023-03-31 | Stop reason: SDUPTHER

## 2022-10-07 NOTE — PROGRESS NOTES
Robert Gallegos MD   [facility]  [facility] FAM[facility] [address] [address]  [phone]      PATIENT NAME: Rosalva Lopez  : 1955  DATE: 10/7/22  MRN: [id number]      Billing Provider: Robert Gallegos MD  Level of Service:   Patient PCP Information       Provider PCP Type    Robert Gallegos MD General            Reason for Visit / Chief Complaint: Follow-up (Follow-up diabetes. )       Update PCP  Update Chief Complaint         History of Present Illness / Problem Focused Workflow     Rosalva Lopez presents to the clinic with Follow-up (Follow-up diabetes. )       Follow up DM, hypothyroidism and MDD.  Needs trazodone refilled.  Says that glucose has been doing better with addition of ozempic.  Is now on higher dose of thyroid med.    Follow-up  Pertinent negatives include no abdominal pain, chest pain, chills, coughing, fever, sore throat or weakness.   Review of Systems     Review of Systems   Constitutional:  Negative for activity change, chills and fever.   HENT:  Negative for sore throat.    Eyes:  Negative for pain.   Respiratory:  Negative for cough, chest tightness and shortness of breath.    Cardiovascular:  Negative for chest pain and palpitations.   Gastrointestinal:  Negative for abdominal pain.   Neurological:  Negative for dizziness, syncope and weakness.   Psychiatric/Behavioral:  Negative for confusion.       Medical / Social / Family History     Past Medical History:   Diagnosis Date    Depressive disorder     Diabetes     GERD (gastroesophageal reflux disease)     Hypothyroidism     Trigeminal neuralgia of left side of face        Past Surgical History:   Procedure Laterality Date    BREAST BIOPSY      CARPAL TUNNEL RELEASE Left     CHOLECYSTECTOMY      HYSTERECTOMY      INGUINAL HERNIA REPAIR      TONSILLECTOMY      TYMPANOPLASTY         Social History    reports that she has never smoked. She has never used  smokeless tobacco. She reports that she does not drink alcohol and does not use drugs.   Social History     Tobacco Use    Smoking status: Never    Smokeless tobacco: Never   Substance Use Topics    Alcohol use: Never    Drug use: Never       Family History  Family History   Problem Relation Age of Onset    Heart disease Mother     Hypertension Father     Diabetes Brother     Breast cancer Maternal Grandmother        Medications and Allergies     Medications  Outpatient Medications Marked as Taking for the 10/7/22 encounter (Office Visit) with Robert Gallegos MD   Medication Sig Dispense Refill    albuterol (VENTOLIN HFA) 90 mcg/actuation inhaler Inhale 2 puffs into the lungs every 6 (six) hours as needed for Wheezing or Shortness of Breath. Rescue 8 g 0    amLODIPine (NORVASC) 10 MG tablet Take 1 tablet (10 mg total) by mouth once daily. 30 tablet 11    aspirin (ECOTRIN) 81 MG EC tablet Take 81 mg by mouth once daily.      diclofenac sodium (VOLTAREN) 1 % Gel Apply 2 g topically 4 (four) times daily. 100 g 0    ergocalciferol (ERGOCALCIFEROL) 50,000 unit Cap Take 5,000 Units by mouth once daily.      estradioL (ESTRACE) 1 MG tablet Take 1 tablet (1 mg total) by mouth once daily. 90 tablet 1    gabapentin (NEURONTIN) 100 MG capsule Take two capsules by mouth twice daily. 360 capsule 1    glipiZIDE (GLUCOTROL) 5 MG tablet Take 1 tablet (5 mg total) by mouth 2 (two) times daily with meals. 180 tablet 2    levothyroxine (SYNTHROID) 50 MCG tablet Take 1 tablet (50 mcg total) by mouth before breakfast. 90 tablet 2    lisinopriL (PRINIVIL,ZESTRIL) 20 MG tablet Take 1 tablet (20 mg total) by mouth once daily. 90 tablet 1    LORazepam (ATIVAN) 0.5 MG tablet Take 0.5 mg by mouth every 6 (six) hours as needed for Anxiety.      pantoprazole (PROTONIX) 40 MG tablet Take 1 tablet (40 mg total) by mouth once daily. 90 tablet 1    pravastatin (PRAVACHOL) 40 MG tablet Take 1 tablet (40 mg total) by mouth every evening. 90 tablet  2    semaglutide (OZEMPIC) 0.25 mg or 0.5 mg(2 mg/1.5 mL) pen injector Inject 0.25 mg into the skin every 7 days. 1 pen 0    tiZANidine (ZANAFLEX) 4 MG tablet Take 1 tablet (4 mg total) by mouth every 8 (eight) hours. Muscle pain/spasm 60 tablet 1    valproic acid (DEPAKENE) 250 mg capsule Take 1 capsule (250 mg total) by mouth 2 (two) times daily. 180 capsule 2    [DISCONTINUED] traZODone (DESYREL) 50 MG tablet Take 1 tablet (50 mg total) by mouth once daily. 90 tablet 1       Allergies  Review of patient's allergies indicates:   Allergen Reactions    Fluzone [id number] tri-whole     Sulfa (sulfonamide antibiotics)     Tamiflu [oseltamivir]        Physical Examination     Vitals:    10/07/22 1436   BP: 133/73   Pulse: 82     Physical Exam  Vitals reviewed.   Constitutional:       Appearance: Normal appearance.   HENT:      Head: Normocephalic and atraumatic.   Eyes:      Extraocular Movements: Extraocular movements intact.      Conjunctiva/sclera: Conjunctivae normal.      Pupils: Pupils are equal, round, and reactive to light.   Cardiovascular:      Rate and Rhythm: Normal rate and regular rhythm.      Heart sounds: Normal heart sounds.   Pulmonary:      Effort: Pulmonary effort is normal.      Breath sounds: Normal breath sounds.   Musculoskeletal:         General: Normal range of motion.      Cervical back: Normal range of motion.   Skin:     General: Skin is warm and dry.   Neurological:      General: No focal deficit present.      Mental Status: She is alert and oriented to person, place, and time.   Psychiatric:         Mood and Affect: Mood normal.         Behavior: Behavior normal.        Assessment and Plan (including Health Maintenance)      Problem List  Smart Sets  Document Outside HM   :    Plan: continue current care.  Follow up one month and recheck A1C at that time.        Health Maintenance Due   Topic Date Due    Hepatitis C Screening  Never done    Diabetes Urine Screening  Never done    Foot Exam   Never done    TETANUS VACCINE  Never done    Colorectal Cancer Screening  Never done    Shingles Vaccine (1 of 2) Never done    Eye Exam  12/04/2021    Hemoglobin A1c  08/13/2022    Influenza Vaccine (1) Never done       Problem List Items Addressed This Visit          Psychiatric    Recurrent major depressive disorder, in partial remission - Primary    Relevant Medications    traZODone (DESYREL) 50 MG tablet       Endocrine    Type 2 diabetes mellitus with diabetic neuropathy, without long-term current use of insulin (Chronic)    Acquired hypothyroidism (Chronic)       Health Maintenance Topics with due status: Not Due       Topic Last Completion Date    DEXA Scan 07/12/2021    Mammogram 07/18/2022    Lipid Panel 09/15/2022       Future Appointments   Date Time Provider Department Center   10/21/2022 10:30 AM AWV NURSE, [facility]   11/8/2022  3:00 PM Robert Gallegos MD [facility]   7/24/2023  8:15 AM [facility]            Signature:  Robert Gallegos MD  [facility]  [facility] [place] [facility]  [address] [place] [address] [place] [id number]  [phone]    Date of encounter: 10/7/22

## 2022-10-20 NOTE — PROGRESS NOTES
[place] AWV THE [facility]      PATIENT NAME: Rosalva Lopez   : 1955    AGE: 67 y.o. DATE: 10/21/2022   MRN: [id number]        Reason for Visit / Chief Complaint: Medicare AWV (Subsequent Medicare AWV )        oRsalva Lopez presents for a Subsequent Medicare AWV today.     The following components were reviewed and updated:    Medical/Social/Family History:  Past Medical History:   Diagnosis Date    Depressive disorder     Diabetes     GERD (gastroesophageal reflux disease)     Hypothyroidism     Trigeminal neuralgia of left side of face         Family History   Problem Relation Age of Onset    Heart disease Mother     Hypertension Father     Diabetes Brother     Breast cancer Maternal Grandmother         Past Surgical History:   Procedure Laterality Date    BREAST BIOPSY      CARPAL TUNNEL RELEASE Left     CHOLECYSTECTOMY      HYSTERECTOMY      INGUINAL HERNIA REPAIR      TONSILLECTOMY      TYMPANOPLASTY         Social History     Tobacco Use   Smoking Status Never   Smokeless Tobacco Never       Social History     Substance and Sexual Activity   Alcohol Use Never         Allergies and Current Medications     Review of patient's allergies indicates:   Allergen Reactions    Fluzone [id number] tri-whole     Sulfa (sulfonamide antibiotics)     Tamiflu [oseltamivir]        Current Outpatient Medications:     albuterol (VENTOLIN HFA) 90 mcg/actuation inhaler, Inhale 2 puffs into the lungs every 6 (six) hours as needed for Wheezing or Shortness of Breath. Rescue, Disp: 8 g, Rfl: 0    amLODIPine (NORVASC) 10 MG tablet, Take 1 tablet (10 mg total) by mouth once daily., Disp: 30 tablet, Rfl: 11    aspirin (ECOTRIN) 81 MG EC tablet, Take 81 mg by mouth once daily., Disp: , Rfl:     diclofenac sodium (VOLTAREN) 1 % Gel, Apply 2 g topically 4 (four) times daily., Disp: 100 g, Rfl: 0    ergocalciferol (ERGOCALCIFEROL) 50,000 unit Cap, Take 5,000 Units by mouth once daily., Disp: , Rfl:      estradioL (ESTRACE) 1 MG tablet, Take 1 tablet (1 mg total) by mouth once daily., Disp: 90 tablet, Rfl: 1    gabapentin (NEURONTIN) 100 MG capsule, Take two capsules by mouth twice daily., Disp: 360 capsule, Rfl: 1    glipiZIDE (GLUCOTROL) 5 MG tablet, Take 1 tablet (5 mg total) by mouth 2 (two) times daily with meals., Disp: 180 tablet, Rfl: 2    levothyroxine (SYNTHROID) 50 MCG tablet, Take 1 tablet (50 mcg total) by mouth before breakfast., Disp: 90 tablet, Rfl: 2    lisinopriL (PRINIVIL,ZESTRIL) 20 MG tablet, Take 1 tablet (20 mg total) by mouth once daily., Disp: 90 tablet, Rfl: 1    LORazepam (ATIVAN) 0.5 MG tablet, Take 0.5 mg by mouth every 6 (six) hours as needed for Anxiety., Disp: , Rfl:     pantoprazole (PROTONIX) 40 MG tablet, Take 1 tablet (40 mg total) by mouth once daily., Disp: 90 tablet, Rfl: 1    pravastatin (PRAVACHOL) 40 MG tablet, Take 1 tablet (40 mg total) by mouth every evening., Disp: 90 tablet, Rfl: 2    semaglutide (OZEMPIC) 0.25 mg or 0.5 mg(2 mg/1.5 mL) pen injector, Inject 0.25 mg into the skin every 7 days., Disp: 1 pen, Rfl: 0    traZODone (DESYREL) 50 MG tablet, Take 1 tablet (50 mg total) by mouth once daily., Disp: 90 tablet, Rfl: 1    valproic acid (DEPAKENE) 250 mg capsule, Take 1 capsule (250 mg total) by mouth 2 (two) times daily., Disp: 180 capsule, Rfl: 2      Health Risk Assessment   Fall Risk: no   Obesity: BMI Body mass index is 33.29 kg/m².   Advance Directive:  Does not have an advanced directive.  Verbal information given and written information provided on today's AVS.   Depression: PHQ9- 1   HTN: DASH diet, exercise, weight management, med compliance, home BP monitoring, and follow-up discussed.   T2DM: diabetic diet, glucose monitoring, activity level, weight management, med compliance, and follow-up discussed.  STI: not high risk   Statin Use: yes      Health Maintenance   Last eye exam: 2 years ago, declines referral,  recommend she call and schedule  appointment   Last CV screen with lipids: 09/15/2022  declines today, has appointment on 11/08/2022 and will get at that time   Diabetes screening with fasting glucose or A1c: 05/13/2022  declines today, has appointment on 11/08/2022 and will get at that time   Last pap/pelvic: History of NICKO     Last Mammogram: 07/18/2022  DEXA: 07/12/2021  Colonoscopy: 02/03/2015  repeat not indicated on report, she states she was instructed to return in 10 years   Flu Vaccine: Allergy   Pneumonia vaccines: declines due to allergy to flu vaccine   COVID vaccine: 09/23/2021   Hep B vaccine: NA  AAA screening: NA   HIV Screening: not high risk  Hepatitis C Screen: available,  declines today, has appointment on 11/08/2022 and will get at that time  Low Dose CT Scan: NA    Health Maintenance Topics with due status: Not Due       Topic Last Completion Date    Colorectal Cancer Screening 02/03/2015    DEXA Scan 07/12/2021    Mammogram 07/18/2022    Lipid Panel 09/15/2022     Health Maintenance Due   Topic Date Due    Hepatitis C Screening  Never done    Diabetes Urine Screening  Never done    Foot Exam  Never done    TETANUS VACCINE  Never done    Shingles Vaccine (1 of 2) Never done    Eye Exam  12/04/2021    Hemoglobin A1c  08/13/2022         Lab results available in Epic or see dates from [facility] above:   Lab Results   Component Value Date    CHOL 159 09/15/2022    CHOL 179 09/23/2021    CHOL 252 (A) 10/04/2017     Lab Results   Component Value Date    HDL 32 (L) 09/15/2022    HDL 32 (L) 09/23/2021    HDL 38 10/04/2017     Lab Results   Component Value Date    LDLCALC 51 09/15/2022    LDLCALC  09/23/2021      Comment:      Unable to calculate due to one of the following values:  Cholesterol <5  HDL Cholesterol <5  Triglycerides <10 or >400    LDLCALC  10/04/2017      Comment:      TRI > 400 INVALID     Lab Results   Component Value Date    TRIG 382 (H) 09/15/2022    TRIG 429 (H) 09/23/2021    TRIG 779 (A) 10/04/2017         Lab Results    Component Value Date    HGBA1C 10.7 (H) 05/13/2022       Sodium   Date Value Ref Range Status   09/15/2022 137 136 - 145 mmol/L Final     Potassium   Date Value Ref Range Status   09/15/2022 4.5 3.5 - 5.1 mmol/L Final     Chloride   Date Value Ref Range Status   09/15/2022 100 98 - 107 mmol/L Final     CO2   Date Value Ref Range Status   09/15/2022 28 21 - 32 mmol/L Final     Glucose   Date Value Ref Range Status   09/15/2022 259 (H) 74 - 106 mg/dL Final     BUN   Date Value Ref Range Status   09/15/2022 21 (H) 7 - 18 mg/dL Final     Creatinine   Date Value Ref Range Status   09/15/2022 1.47 (H) 0.55 - 1.02 mg/dL Final     Calcium   Date Value Ref Range Status   09/15/2022 9.7 8.5 - 10.1 mg/dL Final     Total Protein   Date Value Ref Range Status   08/20/2022 7.3 6.4 - 8.2 g/dL Final     Albumin   Date Value Ref Range Status   08/20/2022 3.2 (L) 3.5 - 5.0 g/dL Final     Bilirubin, Total   Date Value Ref Range Status   08/20/2022 0.3 0.0 - 1.2 mg/dL Final     Alk Phos   Date Value Ref Range Status   08/20/2022 84 55 - 142 U/L Final     AST   Date Value Ref Range Status   08/20/2022 12 (L) 15 - 37 U/L Final     ALT   Date Value Ref Range Status   08/20/2022 22 13 - 56 U/L Final     Anion Gap   Date Value Ref Range Status   09/15/2022 14 7 - 16 mmol/L Final     eGFR    Date Value Ref Range Status   11/30/2020 42       eGFR   Date Value Ref Range Status   06/24/2022 39 (L) >=60 mL/min/1.73m² Final         Incontinence  Bowel: no  Bladder: no      Care Team:  Dr. Cesar  Chiropractor          **See Completed Assessments for Annual Wellness visit within the encounter summary    The following assessments were completed & reviewed:  Depression Screening  Cognitive function Screening  Timed Get Up Test  Whisper Test  Vision Screen  Health Risk Assessment  Checklist of ADLs and IADLs  Opioid Risk Assessment        Objective  Vitals:    10/21/22 1109   BP: 128/79   Pulse: 97   Resp: 14   Temp: 97.8 °F (36.6  "°C)   TempSrc: Oral   SpO2: 96%   Weight: 82.6 kg (182 lb)   Height: 5' 2" (1.575 m)   PainSc:   5   PainLoc: Throat      Body mass index is 33.29 kg/m².  Ideal body weight: 50.1 kg (110 lb 7.2 oz)       Physical Exam  Vitals reviewed.   Constitutional:       Appearance: Normal appearance.   HENT:      Head: Normocephalic and atraumatic.   Eyes:      Extraocular Movements: Extraocular movements intact.      Conjunctiva/sclera: Conjunctivae normal.      Pupils: Pupils are equal, round, and reactive to light.   Cardiovascular:      Rate and Rhythm: Normal rate and regular rhythm.      Heart sounds: Normal heart sounds.   Pulmonary:      Effort: Pulmonary effort is normal.      Breath sounds: Normal breath sounds.   Musculoskeletal:         General: Normal range of motion.   Skin:     General: Skin is warm and dry.   Neurological:      General: No focal deficit present.      Mental Status: She is alert and oriented to person, place, and time.   Psychiatric:         Mood and Affect: Mood normal.         Behavior: Behavior normal.       Assessment:     1. Encounter for subsequent annual wellness visit (AWV) in Medicare patient    2. Essential hypertension    3. Mixed hyperlipidemia    4. Acquired hypothyroidism    5. Recurrent major depressive disorder, in partial remission    6. Type 2 diabetes mellitus with diabetic neuropathy, without long-term current use of insulin    7. BMI 33.0-33.9,adult    8. Screening for viral disease       Problem List Items Addressed This Visit          Psychiatric    Recurrent major depressive disorder, in partial remission       Cardiac/Vascular    Mixed hyperlipidemia (Chronic)    Essential hypertension       Endocrine    Type 2 diabetes mellitus with diabetic neuropathy, without long-term current use of insulin (Chronic)    Acquired hypothyroidism (Chronic)     Other Visit Diagnoses       Encounter for subsequent annual wellness visit (AWV) in Medicare patient    -  Primary    BMI " 33.0-33.9,adult        Screening for viral disease                Plan:    Referrals:   none     Advised to call office if does not hear from anyone with referral appt within 2-3 weeks to check on status of referral. Voiced understanding.      Discussed and provided with a screening schedule and personal prevention plan in accordance with USPSTF age appropriate recommendations and Medicare screening guidelines.   Education, counseling, and referrals were provided as needed.  After Visit Summary printed and given to patient which includes written education and a list of any referrals if indicated.     Education including diet, exercise, falls and advanced directives discussed with patient and patient verbalized understanding.      F/u plan for yearly AWV.    Signature: Dr. Robert Gallegos MD

## 2022-10-21 ENCOUNTER — OFFICE VISIT (OUTPATIENT)
Dept: FAMILY MEDICINE | Facility: CLINIC | Age: 67
End: 2022-10-21
Payer: MEDICARE

## 2022-10-21 ENCOUNTER — TELEPHONE (OUTPATIENT)
Dept: FAMILY MEDICINE | Facility: CLINIC | Age: 67
End: 2022-10-21
Payer: MEDICARE

## 2022-10-21 VITALS
HEIGHT: 62 IN | TEMPERATURE: 98 F | WEIGHT: 182 LBS | HEART RATE: 97 BPM | SYSTOLIC BLOOD PRESSURE: 128 MMHG | BODY MASS INDEX: 33.49 KG/M2 | DIASTOLIC BLOOD PRESSURE: 79 MMHG | RESPIRATION RATE: 14 BRPM | OXYGEN SATURATION: 96 %

## 2022-10-21 DIAGNOSIS — E11.40 TYPE 2 DIABETES MELLITUS WITH DIABETIC NEUROPATHY, WITHOUT LONG-TERM CURRENT USE OF INSULIN: Chronic | ICD-10-CM

## 2022-10-21 DIAGNOSIS — I10 ESSENTIAL HYPERTENSION: Chronic | ICD-10-CM

## 2022-10-21 DIAGNOSIS — F33.41 RECURRENT MAJOR DEPRESSIVE DISORDER, IN PARTIAL REMISSION: Chronic | ICD-10-CM

## 2022-10-21 DIAGNOSIS — Z00.00 ENCOUNTER FOR SUBSEQUENT ANNUAL WELLNESS VISIT (AWV) IN MEDICARE PATIENT: Primary | ICD-10-CM

## 2022-10-21 DIAGNOSIS — E78.2 MIXED HYPERLIPIDEMIA: Chronic | ICD-10-CM

## 2022-10-21 DIAGNOSIS — E03.9 ACQUIRED HYPOTHYROIDISM: Chronic | ICD-10-CM

## 2022-10-21 DIAGNOSIS — Z11.59 SCREENING FOR VIRAL DISEASE: ICD-10-CM

## 2022-10-21 PROCEDURE — G0439 PPPS, SUBSEQ VISIT: HCPCS | Mod: ,,, | Performed by: FAMILY MEDICINE

## 2022-10-21 PROCEDURE — G0439 PR MEDICARE ANNUAL WELLNESS SUBSEQUENT VISIT: ICD-10-PCS | Mod: ,,, | Performed by: FAMILY MEDICINE

## 2022-10-21 NOTE — PATIENT INSTRUCTIONS
Counseling and Referral of Other Preventative  (Italic type indicates deductible and co-insurance are waived)    Patient Name: Rosalva Lopez  Today's Date: 10/21/2022    Health Maintenance         Date Due Completion Date    Hepatitis C Screening Never done ---    Diabetes Urine Screening Never done ---    Foot Exam Never done ---    TETANUS VACCINE Never done ---    Shingles Vaccine (1 of 2) Never done ---    Eye Exam 12/04/2021 12/4/2020 (Done)    Override on 12/4/2020: Done    Hemoglobin A1c 08/13/2022 5/13/2022    Influenza Vaccine (1) Never done ---    Mammogram 07/18/2023 7/18/2022    Lipid Panel 09/15/2023 9/15/2022    DEXA Scan 07/12/2024 7/12/2021    Colorectal Cancer Screening 02/03/2025 2/3/2015          No orders of the defined types were placed in this encounter.

## 2022-10-21 NOTE — TELEPHONE ENCOUNTER
Patient has tested positive for COVID-19 10/21/2022.  I have instructed to isolate x 5 days and then wear mask x 5 days if she is around other people.  Rx for zpak called in to [facility].  Advised to get plenty of rest, stay well hydrated and treat symptomatically.  Call ED over weekend or after hours for any concerns.  Call clinic if during office hours.

## 2022-10-31 ENCOUNTER — EXTERNAL CHRONIC CARE MANAGEMENT (OUTPATIENT)
Dept: FAMILY MEDICINE | Facility: CLINIC | Age: 67
End: 2022-10-31
Payer: MEDICARE

## 2022-10-31 PROCEDURE — G0511 CCM/BHI BY RHC/FQHC 20MIN MO: HCPCS | Mod: ,,, | Performed by: FAMILY MEDICINE

## 2022-10-31 PROCEDURE — G0511 PR CHRONIC CARE MGMT, RHC OR FQHC ONLY, 20 MINS OR MORE: ICD-10-PCS | Mod: ,,, | Performed by: FAMILY MEDICINE

## 2022-11-18 ENCOUNTER — OFFICE VISIT (OUTPATIENT)
Dept: FAMILY MEDICINE | Facility: CLINIC | Age: 67
End: 2022-11-18
Payer: MEDICARE

## 2022-11-18 VITALS
WEIGHT: 181 LBS | BODY MASS INDEX: 33.31 KG/M2 | HEIGHT: 62 IN | SYSTOLIC BLOOD PRESSURE: 130 MMHG | DIASTOLIC BLOOD PRESSURE: 77 MMHG | HEART RATE: 80 BPM

## 2022-11-18 DIAGNOSIS — I10 ESSENTIAL HYPERTENSION: Chronic | ICD-10-CM

## 2022-11-18 DIAGNOSIS — E11.40 TYPE 2 DIABETES MELLITUS WITH DIABETIC NEUROPATHY, WITHOUT LONG-TERM CURRENT USE OF INSULIN: Primary | Chronic | ICD-10-CM

## 2022-11-18 DIAGNOSIS — Z86.16 PERSONAL HISTORY OF COVID-19: ICD-10-CM

## 2022-11-18 DIAGNOSIS — Z11.59 NEED FOR HEPATITIS C SCREENING TEST: ICD-10-CM

## 2022-11-18 LAB
EST. AVERAGE GLUCOSE BLD GHB EST-MCNC: 184 MG/DL
HBA1C MFR BLD HPLC: 8.1 % (ref 4.5–6.6)
HCV AB SER QL: NORMAL

## 2022-11-18 PROCEDURE — 99214 OFFICE O/P EST MOD 30 MIN: CPT | Mod: ,,, | Performed by: FAMILY MEDICINE

## 2022-11-18 PROCEDURE — 86803 HEPATITIS C ANTIBODY: ICD-10-PCS | Mod: ,,, | Performed by: CLINICAL MEDICAL LABORATORY

## 2022-11-18 PROCEDURE — 83036 HEMOGLOBIN A1C: ICD-10-PCS | Mod: ,,, | Performed by: CLINICAL MEDICAL LABORATORY

## 2022-11-18 PROCEDURE — 83036 HEMOGLOBIN GLYCOSYLATED A1C: CPT | Mod: ,,, | Performed by: CLINICAL MEDICAL LABORATORY

## 2022-11-18 PROCEDURE — 99214 PR OFFICE/OUTPT VISIT, EST, LEVL IV, 30-39 MIN: ICD-10-PCS | Mod: ,,, | Performed by: FAMILY MEDICINE

## 2022-11-18 PROCEDURE — 86803 HEPATITIS C AB TEST: CPT | Mod: ,,, | Performed by: CLINICAL MEDICAL LABORATORY

## 2022-11-18 RX ORDER — EXENATIDE 2 MG/.85ML
2 INJECTION, SUSPENSION, EXTENDED RELEASE SUBCUTANEOUS
Qty: 2 PEN | Refills: 0
Start: 2022-11-18 | End: 2022-12-20

## 2022-11-18 NOTE — PROGRESS NOTES
Robert Gallegos MD   [facility]  MEDICAL GROUP [facility] FAMILY [address] [address]  [phone]      PATIENT NAME: Rosalva Lopez  : 1955  DATE: 22  MRN: [id number]      Billing Provider: Robert Gallegos MD  Level of Service:   Patient PCP Information       Provider PCP Type    Robert Gallegos MD General            Reason for Visit / Chief Complaint: Follow-up       Update PCP  Update Chief Complaint         History of Present Illness / Problem Focused Workflow     Rosalva Lopez presents to the clinic with Follow-up       66 yo WF here for follow up DM and HTN.  Has been adjusting diet and says that she has lost a total of 13 lbs.  Glucose has been doing much better since cutting out sweets and says has been running 120s recently.  She was diagnosed with COVID-19 about one month ago.  Still having some episodes where she feels weak.  Says that it predominantly affected her sinuses.    Follow-up  Associated symptoms include congestion. Pertinent negatives include no abdominal pain, chest pain, chills, coughing, fever or sore throat.     Review of Systems     Review of Systems   Constitutional:  Negative for activity change, chills and fever.   HENT:  Positive for nasal congestion. Negative for sore throat.    Eyes:  Negative for pain.   Respiratory:  Negative for cough, chest tightness and shortness of breath.    Cardiovascular:  Negative for chest pain and palpitations.   Gastrointestinal:  Negative for abdominal pain.   Neurological:  Negative for dizziness and syncope.   Psychiatric/Behavioral:  Negative for confusion.       Medical / Social / Family History     Past Medical History:   Diagnosis Date    Depressive disorder     Diabetes     GERD (gastroesophageal reflux disease)     Hypothyroidism     Trigeminal neuralgia of left side of face        Past Surgical History:   Procedure Laterality Date    BREAST BIOPSY      CARPAL  TUNNEL RELEASE Left     CHOLECYSTECTOMY      HYSTERECTOMY      INGUINAL HERNIA REPAIR      TONSILLECTOMY      TYMPANOPLASTY         Social History    reports that she has never smoked. She has never used smokeless tobacco. She reports that she does not drink alcohol and does not use drugs.   Social History     Tobacco Use    Smoking status: Never    Smokeless tobacco: Never   Substance Use Topics    Alcohol use: Never    Drug use: Never       Family History  Family History   Problem Relation Age of Onset    Heart disease Mother     Hypertension Father     Diabetes Brother     Breast cancer Maternal Grandmother        Medications and Allergies     Medications  No outpatient medications have been marked as taking for the 11/18/22 encounter (Office Visit) with Robert Gallegos MD.       Allergies  Review of patient's allergies indicates:   Allergen Reactions    Fluzone [id number] tri-whole     Sulfa (sulfonamide antibiotics)     Tamiflu [oseltamivir]        Physical Examination     Vitals:    11/18/22 1358   BP: 130/77   Pulse: 80     Physical Exam  Vitals reviewed.   Constitutional:       Appearance: Normal appearance.   HENT:      Head: Normocephalic and atraumatic.   Eyes:      Extraocular Movements: Extraocular movements intact.      Conjunctiva/sclera: Conjunctivae normal.      Pupils: Pupils are equal, round, and reactive to light.   Cardiovascular:      Rate and Rhythm: Normal rate and regular rhythm.      Heart sounds: Normal heart sounds.   Pulmonary:      Effort: Pulmonary effort is normal.      Breath sounds: Normal breath sounds.   Musculoskeletal:         General: Normal range of motion.      Cervical back: Normal range of motion.   Skin:     General: Skin is warm and dry.   Neurological:      General: No focal deficit present.      Mental Status: She is alert and oriented to person, place, and time.   Psychiatric:         Mood and Affect: Mood normal.         Behavior: Behavior normal.        Assessment  and Plan (including Health Maintenance)      Problem List  Smart Sets  Document Outside HM   :    Plan: trial of bydureon Bcise once weekly injection #2 sample pens given  RTC follow up 1 month        Health Maintenance Due   Topic Date Due    Hepatitis C Screening  Never done    Diabetes Urine Screening  Never done    Foot Exam  Never done    TETANUS VACCINE  Never done    Shingles Vaccine (1 of 2) Never done    Eye Exam  12/04/2021    Hemoglobin A1c  08/13/2022       Problem List Items Addressed This Visit          Cardiac/Vascular    Essential hypertension       Endocrine    Type 2 diabetes mellitus with diabetic neuropathy, without long-term current use of insulin - Primary (Chronic)    Relevant Medications    exenatide microspheres (BYDUREON BCISE) 2 mg/0.85 mL AtIn    Other Relevant Orders    Hemoglobin A1C    Microalbumin/Creatinine Ratio, Urine     Other Visit Diagnoses       Need for hepatitis C screening test        Relevant Orders    Hepatitis C Antibody    Personal history of COVID-19                Health Maintenance Topics with due status: Not Due       Topic Last Completion Date    Colorectal Cancer Screening 02/03/2015    DEXA Scan 07/12/2021    Mammogram 07/18/2022    Lipid Panel 09/15/2022       Future Appointments   Date Time Provider Department Center   12/20/2022  9:45 AM Robert Gallegos MD [facility]   7/24/2023  8:15 AM [facility]   10/13/2023  2:00 PM AWV NURSE, [facility]ILY MEDIC[facility]            Signature:  MD ABELARDO [facility]  MEDICAL GROUP [facility] - FAMILY MEDICINE  [address] [place] [address] [place] [address]  [phone]    Date of encounter: 11/18/22

## 2022-11-30 ENCOUNTER — EXTERNAL CHRONIC CARE MANAGEMENT (OUTPATIENT)
Dept: FAMILY MEDICINE | Facility: CLINIC | Age: 67
End: 2022-11-30
Payer: MEDICARE

## 2022-11-30 PROCEDURE — G0511 PR CHRONIC CARE MGMT, RHC OR FQHC ONLY, 20 MINS OR MORE: ICD-10-PCS | Mod: ,,, | Performed by: FAMILY MEDICINE

## 2022-11-30 PROCEDURE — G0511 CCM/BHI BY RHC/FQHC 20MIN MO: HCPCS | Mod: ,,, | Performed by: FAMILY MEDICINE

## 2022-12-13 DIAGNOSIS — G57.93 NEUROPATHY OF BOTH FEET: Chronic | ICD-10-CM

## 2022-12-13 DIAGNOSIS — E11.40 TYPE 2 DIABETES MELLITUS WITH DIABETIC NEUROPATHY, WITHOUT LONG-TERM CURRENT USE OF INSULIN: Chronic | ICD-10-CM

## 2022-12-13 RX ORDER — GABAPENTIN 100 MG/1
CAPSULE ORAL
Qty: 360 CAPSULE | Refills: 1 | Status: SHIPPED | OUTPATIENT
Start: 2022-12-13 | End: 2023-07-18 | Stop reason: SDUPTHER

## 2022-12-20 ENCOUNTER — OFFICE VISIT (OUTPATIENT)
Dept: FAMILY MEDICINE | Facility: CLINIC | Age: 67
End: 2022-12-20
Payer: MEDICARE

## 2022-12-20 VITALS
HEART RATE: 83 BPM | BODY MASS INDEX: 33.31 KG/M2 | DIASTOLIC BLOOD PRESSURE: 80 MMHG | WEIGHT: 181 LBS | HEIGHT: 62 IN | SYSTOLIC BLOOD PRESSURE: 126 MMHG

## 2022-12-20 DIAGNOSIS — I10 ESSENTIAL HYPERTENSION: Chronic | ICD-10-CM

## 2022-12-20 DIAGNOSIS — E11.40 TYPE 2 DIABETES MELLITUS WITH DIABETIC NEUROPATHY, WITHOUT LONG-TERM CURRENT USE OF INSULIN: Primary | Chronic | ICD-10-CM

## 2022-12-20 PROCEDURE — 99214 PR OFFICE/OUTPT VISIT, EST, LEVL IV, 30-39 MIN: ICD-10-PCS | Mod: ,,, | Performed by: FAMILY MEDICINE

## 2022-12-20 PROCEDURE — 99214 OFFICE O/P EST MOD 30 MIN: CPT | Mod: ,,, | Performed by: FAMILY MEDICINE

## 2022-12-20 RX ORDER — SEMAGLUTIDE 1.34 MG/ML
0.25 INJECTION, SOLUTION SUBCUTANEOUS
Qty: 1 PEN | Refills: 5 | Status: SHIPPED | OUTPATIENT
Start: 2022-12-20 | End: 2023-09-13 | Stop reason: SDUPTHER

## 2022-12-20 NOTE — PROGRESS NOTES
Robert Gallegos MD   [facility]  MEDICAL GROUP [facility] FAMILY [address] [address]  [phone]      PATIENT NAME: Rosalva Lopez  : 1955  DATE: 22  MRN: [id number]      Billing Provider: Robert Gallegos MD  Level of Service:   Patient PCP Information       Provider PCP Type    Robert Gallegos MD General            Reason for Visit / Chief Complaint: Follow-up       Update PCP  Update Chief Complaint         History of Present Illness / Problem Focused Workflow     Rosalva Lopez presents to the clinic with Follow-up       66 yo WF here for follow DM.  Has lost 15 lbs in past couple of months.  Continues efforts to improve diet.  A1C is improving.  At last check it had come down to 8.1 from 10.7.  HTN is doing well.      Review of Systems     Review of Systems   Constitutional:  Negative for activity change, chills and fever.   HENT:  Negative for sore throat.    Eyes:  Negative for pain.   Respiratory:  Negative for cough, chest tightness and shortness of breath.    Cardiovascular:  Negative for chest pain and palpitations.   Gastrointestinal:  Negative for abdominal pain.   Neurological:  Negative for dizziness, syncope and weakness.   Psychiatric/Behavioral:  Negative for confusion.       Medical / Social / Family History     Past Medical History:   Diagnosis Date    Depressive disorder     Diabetes     GERD (gastroesophageal reflux disease)     Hypothyroidism     Trigeminal neuralgia of left side of face        Past Surgical History:   Procedure Laterality Date    BREAST BIOPSY      CARPAL TUNNEL RELEASE Left     CHOLECYSTECTOMY      HYSTERECTOMY      INGUINAL HERNIA REPAIR      TONSILLECTOMY      TYMPANOPLASTY         Social History    reports that she has never smoked. She has never used smokeless tobacco. She reports that she does not drink alcohol and does not use drugs.   Social History     Tobacco Use    Smoking status:  Never    Smokeless tobacco: Never   Substance Use Topics    Alcohol use: Never    Drug use: Never       Family History  Family History   Problem Relation Age of Onset    Heart disease Mother     Hypertension Father     Diabetes Brother     Breast cancer Maternal Grandmother        Medications and Allergies     Medications  No outpatient medications have been marked as taking for the 12/20/22 encounter (Office Visit) with Robert Gallegos MD.       Allergies  Review of patient's allergies indicates:   Allergen Reactions    Fluzone [id number] tri-whole     Sulfa (sulfonamide antibiotics)     Tamiflu [oseltamivir]        Physical Examination     Vitals:    12/20/22 1013   BP: 126/80   Pulse: 83     Physical Exam  Vitals reviewed.   Constitutional:       Appearance: Normal appearance.   HENT:      Head: Normocephalic and atraumatic.   Eyes:      Extraocular Movements: Extraocular movements intact.      Conjunctiva/sclera: Conjunctivae normal.      Pupils: Pupils are equal, round, and reactive to light.   Cardiovascular:      Rate and Rhythm: Normal rate and regular rhythm.      Heart sounds: Normal heart sounds.   Pulmonary:      Effort: Pulmonary effort is normal.      Breath sounds: Normal breath sounds.   Musculoskeletal:         General: Normal range of motion.      Cervical back: Normal range of motion.   Skin:     General: Skin is warm and dry.   Neurological:      General: No focal deficit present.      Mental Status: She is alert and oriented to person, place, and time.   Psychiatric:         Mood and Affect: Mood normal.         Behavior: Behavior normal.      No visits with results within 1 Month(s) from this visit.   Latest known visit with results is:   Office Visit on 11/18/2022   Component Date Value Ref Range Status    Hemoglobin A1C 11/18/2022 8.1 (H)  4.5 - 6.6 % Final      Normal:               <5.7%  Pre-Diabetic:       5.7% to 6.4%  Diabetic:             >6.4%  Diabetic Goal:     <7%    Estimated  Average Glucose 11/18/2022 184  mg/dL Final    Hepatitis C Ab 11/18/2022 Non-Reactive  Non-Reactive Final         Assessment and Plan (including Health Maintenance)      Problem List  Smart Sets  Document Outside HM   :    Plan: continue working on diet/weight loss        Health Maintenance Due   Topic Date Due    Diabetes Urine Screening  Never done    Foot Exam  Never done    TETANUS VACCINE  Never done    Shingles Vaccine (1 of 2) Never done    Eye Exam  12/04/2021       Problem List Items Addressed This Visit          Endocrine    Type 2 diabetes mellitus with diabetic neuropathy, without long-term current use of insulin - Primary (Chronic)    Relevant Medications    semaglutide (OZEMPIC) 0.25 mg or 0.5 mg(2 mg/1.5 mL) pen injector       Health Maintenance Topics with due status: Not Due       Topic Last Completion Date    Colorectal Cancer Screening 02/03/2015    DEXA Scan 07/12/2021    Mammogram 07/18/2022    Lipid Panel 09/15/2022    Hemoglobin A1c 11/18/2022       Future Appointments   Date Time Provider Department Center   7/24/2023  8:15 AM [facility]   10/13/2023  2:00 PM AW[facility] NURSE, [facility]            Signature:  MD ABELARDO Murcia NEAL [facility]  MEDICAL GROUP [facility] - FAMILY MEDICINE  [address] [place] [address] [place] [address]  [phone]    Date of encounter: 12/20/22

## 2022-12-31 ENCOUNTER — EXTERNAL CHRONIC CARE MANAGEMENT (OUTPATIENT)
Dept: FAMILY MEDICINE | Facility: CLINIC | Age: 67
End: 2022-12-31
Payer: MEDICARE

## 2022-12-31 PROCEDURE — G0511 CCM/BHI BY RHC/FQHC 20MIN MO: HCPCS | Mod: ,,, | Performed by: FAMILY MEDICINE

## 2022-12-31 PROCEDURE — G0511 PR CHRONIC CARE MGMT, RHC OR FQHC ONLY, 20 MINS OR MORE: ICD-10-PCS | Mod: ,,, | Performed by: FAMILY MEDICINE

## 2023-01-27 DIAGNOSIS — E89.40 ASYMPTOMATIC POSTSURGICAL MENOPAUSE: Chronic | ICD-10-CM

## 2023-01-27 DIAGNOSIS — K21.9 GASTROESOPHAGEAL REFLUX DISEASE WITHOUT ESOPHAGITIS: Chronic | ICD-10-CM

## 2023-01-27 RX ORDER — PANTOPRAZOLE SODIUM 40 MG/1
40 TABLET, DELAYED RELEASE ORAL DAILY
Qty: 90 TABLET | Refills: 1 | Status: SHIPPED | OUTPATIENT
Start: 2023-01-27 | End: 2023-07-18 | Stop reason: SDUPTHER

## 2023-01-27 RX ORDER — ESTRADIOL 1 MG/1
1 TABLET ORAL DAILY
Qty: 90 TABLET | Refills: 1 | Status: SHIPPED | OUTPATIENT
Start: 2023-01-27 | End: 2023-07-18 | Stop reason: SDUPTHER

## 2023-01-31 ENCOUNTER — EXTERNAL CHRONIC CARE MANAGEMENT (OUTPATIENT)
Dept: FAMILY MEDICINE | Facility: CLINIC | Age: 68
End: 2023-01-31
Payer: MEDICARE

## 2023-01-31 PROCEDURE — G0511 CCM/BHI BY RHC/FQHC 20MIN MO: HCPCS | Mod: ,,, | Performed by: FAMILY MEDICINE

## 2023-01-31 PROCEDURE — G0511 PR CHRONIC CARE MGMT, RHC OR FQHC ONLY, 20 MINS OR MORE: ICD-10-PCS | Mod: ,,, | Performed by: FAMILY MEDICINE

## 2023-02-28 ENCOUNTER — EXTERNAL CHRONIC CARE MANAGEMENT (OUTPATIENT)
Dept: FAMILY MEDICINE | Facility: CLINIC | Age: 68
End: 2023-02-28
Payer: MEDICARE

## 2023-02-28 ENCOUNTER — OFFICE VISIT (OUTPATIENT)
Dept: FAMILY MEDICINE | Facility: CLINIC | Age: 68
End: 2023-02-28
Payer: MEDICARE

## 2023-02-28 VITALS
DIASTOLIC BLOOD PRESSURE: 73 MMHG | BODY MASS INDEX: 27.28 KG/M2 | HEIGHT: 68 IN | HEART RATE: 93 BPM | WEIGHT: 180 LBS | SYSTOLIC BLOOD PRESSURE: 136 MMHG

## 2023-02-28 DIAGNOSIS — F33.41 RECURRENT MAJOR DEPRESSIVE DISORDER, IN PARTIAL REMISSION: Chronic | ICD-10-CM

## 2023-02-28 DIAGNOSIS — E03.9 ACQUIRED HYPOTHYROIDISM: Chronic | ICD-10-CM

## 2023-02-28 DIAGNOSIS — N18.32 STAGE 3B CHRONIC KIDNEY DISEASE: Chronic | ICD-10-CM

## 2023-02-28 DIAGNOSIS — J61 ASBESTOSIS: Chronic | ICD-10-CM

## 2023-02-28 DIAGNOSIS — R56.9 CONVULSIONS, UNSPECIFIED CONVULSION TYPE: Chronic | ICD-10-CM

## 2023-02-28 DIAGNOSIS — E11.40 TYPE 2 DIABETES MELLITUS WITH DIABETIC NEUROPATHY, WITHOUT LONG-TERM CURRENT USE OF INSULIN: Primary | Chronic | ICD-10-CM

## 2023-02-28 DIAGNOSIS — I10 ESSENTIAL HYPERTENSION: Chronic | ICD-10-CM

## 2023-02-28 PROCEDURE — 83036 HEMOGLOBIN A1C: ICD-10-PCS | Mod: ,,, | Performed by: CLINICAL MEDICAL LABORATORY

## 2023-02-28 PROCEDURE — G0511 CCM/BHI BY RHC/FQHC 20MIN MO: HCPCS | Mod: ,,, | Performed by: FAMILY MEDICINE

## 2023-02-28 PROCEDURE — G0511 PR CHRONIC CARE MGMT, RHC OR FQHC ONLY, 20 MINS OR MORE: ICD-10-PCS | Mod: ,,, | Performed by: FAMILY MEDICINE

## 2023-02-28 PROCEDURE — 84443 ASSAY THYROID STIM HORMONE: CPT | Mod: ,,, | Performed by: CLINICAL MEDICAL LABORATORY

## 2023-02-28 PROCEDURE — 99214 PR OFFICE/OUTPT VISIT, EST, LEVL IV, 30-39 MIN: ICD-10-PCS | Mod: ,,, | Performed by: FAMILY MEDICINE

## 2023-02-28 PROCEDURE — 85025 COMPLETE CBC W/AUTO DIFF WBC: CPT | Mod: ,,, | Performed by: CLINICAL MEDICAL LABORATORY

## 2023-02-28 PROCEDURE — 83036 HEMOGLOBIN GLYCOSYLATED A1C: CPT | Mod: ,,, | Performed by: CLINICAL MEDICAL LABORATORY

## 2023-02-28 PROCEDURE — 85025 CBC WITH DIFFERENTIAL: ICD-10-PCS | Mod: ,,, | Performed by: CLINICAL MEDICAL LABORATORY

## 2023-02-28 PROCEDURE — 80048 BASIC METABOLIC PNL TOTAL CA: CPT | Mod: ,,, | Performed by: CLINICAL MEDICAL LABORATORY

## 2023-02-28 PROCEDURE — 99214 OFFICE O/P EST MOD 30 MIN: CPT | Mod: ,,, | Performed by: FAMILY MEDICINE

## 2023-02-28 PROCEDURE — 84443 TSH: ICD-10-PCS | Mod: ,,, | Performed by: CLINICAL MEDICAL LABORATORY

## 2023-02-28 PROCEDURE — 80048 BASIC METABOLIC PANEL: ICD-10-PCS | Mod: ,,, | Performed by: CLINICAL MEDICAL LABORATORY

## 2023-02-28 NOTE — PROGRESS NOTES
Robert Gallegos MD   [facility]  MEDICAL GROUP [address] [address]  [phone]      PATIENT NAME: Rosalva Lopez  : 1955  DATE: 23  MRN: [id number]      Billing Provider: Robert Gallegos MD  Level of Service:   Patient PCP Information       Provider PCP Type    Robert Gallegos MD General            Reason for Visit / Chief Complaint: Fatigue       Update PCP  Update Chief Complaint         History of Present Illness / Problem Focused Workflow     Rosalva Lopez presents to the clinic with Fatigue       67 yo WF with MMP including HTN and DM.  Has BP and glucose logs with her.  BP has been very well controlled and glucose well controlled.  Did have glucose >200 last week.  Has been fatigued for past week since she had elevated glucose, but glucose has been well controlled since then.  Says that she does not sleep well.  Has been trying to lose weight by diet and exercise.  Is down a total of 20 lbs.  Needs A1C and some other labs checked.    Review of Systems     Review of Systems   Constitutional:  Positive for fatigue. Negative for activity change, chills and fever.   HENT:  Negative for sore throat.    Eyes:  Negative for pain.   Respiratory:  Negative for cough, chest tightness and shortness of breath.    Cardiovascular:  Negative for chest pain and palpitations.   Gastrointestinal:  Negative for abdominal pain.   Neurological:  Negative for dizziness, syncope and weakness.   Psychiatric/Behavioral:  Negative for confusion. The patient is nervous/anxious.       Medical / Social / Family History     Past Medical History:   Diagnosis Date    Depressive disorder     Diabetes     GERD (gastroesophageal reflux disease)     Hypothyroidism     Trigeminal neuralgia of left side of face        Past Surgical History:   Procedure Laterality Date    BREAST BIOPSY      CARPAL TUNNEL RELEASE Left     CHOLECYSTECTOMY       HYSTERECTOMY      INGUINAL HERNIA REPAIR      TONSILLECTOMY      TYMPANOPLASTY         Social History    reports that she has never smoked. She has never been exposed to tobacco smoke. She has never used smokeless tobacco. She reports that she does not drink alcohol and does not use drugs.   Social History     Tobacco Use    Smoking status: Never     Passive exposure: Never    Smokeless tobacco: Never   Substance Use Topics    Alcohol use: Never    Drug use: Never       Family History  Family History   Problem Relation Age of Onset    Heart disease Mother     Hypertension Father     Diabetes Brother     Breast cancer Maternal Grandmother        Medications and Allergies     Medications  Outpatient Medications Marked as Taking for the 2/28/23 encounter (Office Visit) with Robert Gallegos MD   Medication Sig Dispense Refill    albuterol (VENTOLIN HFA) 90 mcg/actuation inhaler Inhale 2 puffs into the lungs every 6 (six) hours as needed for Wheezing or Shortness of Breath. Rescue 8 g 0    amLODIPine (NORVASC) 10 MG tablet Take 1 tablet (10 mg total) by mouth once daily. 30 tablet 11    aspirin (ECOTRIN) 81 MG EC tablet Take 81 mg by mouth once daily.      diclofenac sodium (VOLTAREN) 1 % Gel Apply 2 g topically 4 (four) times daily. 100 g 0    ergocalciferol (ERGOCALCIFEROL) 50,000 unit Cap Take 5,000 Units by mouth once daily.      estradioL (ESTRACE) 1 MG tablet Take 1 tablet (1 mg total) by mouth once daily. 90 tablet 1    gabapentin (NEURONTIN) 100 MG capsule Take two capsules by mouth twice daily. 360 capsule 1    glipiZIDE (GLUCOTROL) 5 MG tablet Take 1 tablet (5 mg total) by mouth 2 (two) times daily with meals. 180 tablet 2    levothyroxine (SYNTHROID) 50 MCG tablet Take 1 tablet (50 mcg total) by mouth before breakfast. 90 tablet 2    LORazepam (ATIVAN) 0.5 MG tablet Take 0.5 mg by mouth every 6 (six) hours as needed for Anxiety.      pantoprazole (PROTONIX) 40 MG tablet Take 1 tablet (40 mg total) by mouth  once daily. 90 tablet 1    pravastatin (PRAVACHOL) 40 MG tablet Take 1 tablet (40 mg total) by mouth every evening. 90 tablet 2    semaglutide (OZEMPIC) 0.25 mg or 0.5 mg(2 mg/1.5 mL) pen injector Inject 0.25 mg into the skin every 7 days. 1 pen 5    traZODone (DESYREL) 50 MG tablet Take 1 tablet (50 mg total) by mouth once daily. 90 tablet 1    valproic acid (DEPAKENE) 250 mg capsule Take 1 capsule (250 mg total) by mouth 2 (two) times daily. 180 capsule 2       Allergies  Review of patient's allergies indicates:   Allergen Reactions    Fluzone [id number] tri-whole     Sulfa (sulfonamide antibiotics)     Tamiflu [oseltamivir]        Physical Examination     Vitals:    02/28/23 1416   BP: 136/73   Pulse: 93     Physical Exam  Vitals reviewed.   Constitutional:       Appearance: Normal appearance.   HENT:      Head: Normocephalic and atraumatic.   Eyes:      Extraocular Movements: Extraocular movements intact.      Conjunctiva/sclera: Conjunctivae normal.      Pupils: Pupils are equal, round, and reactive to light.   Cardiovascular:      Rate and Rhythm: Normal rate and regular rhythm.      Heart sounds: Normal heart sounds.   Pulmonary:      Effort: Pulmonary effort is normal.      Breath sounds: Normal breath sounds.   Musculoskeletal:         General: Normal range of motion.      Cervical back: Normal range of motion.   Skin:     General: Skin is warm and dry.   Neurological:      General: No focal deficit present.      Mental Status: She is alert and oriented to person, place, and time.   Psychiatric:         Mood and Affect: Mood normal.         Behavior: Behavior normal.      Office Visit on 11/18/2022   Component Date Value Ref Range Status    Hemoglobin A1C 11/18/2022 8.1 (H)  4.5 - 6.6 % Final      Normal:               <5.7%  Pre-Diabetic:       5.7% to 6.4%  Diabetic:             >6.4%  Diabetic Goal:     <7%    Estimated Average Glucose 11/18/2022 184  mg/dL Final    Hepatitis C Ab 11/18/2022 Non-Reactive   Non-Reactive Final   Office Visit on 09/15/2022   Component Date Value Ref Range Status    Triglycerides 09/15/2022 382 (H)  35 - 150 mg/dL Final      Normal:  <150 mg/dL  Borderline High: 150-199 mg/dL  High:   200-499 mg/dL  Very High:  >=500    Cholesterol 09/15/2022 159  0 - 200 mg/dL Final      <200 mg/dL:  Desirable  200-240 mg/dL: Borderline High  >240 mg/dL:  High    HDL Cholesterol 09/15/2022 32 (L)  40 - 60 mg/dL Final      <40 mg/dL: Low HDL  40-60 mg/dL: Normal  >60 mg/dL: Desirable    Cholesterol/HDL Ratio (Risk Factor) 09/15/2022 5.0   Final    Non-HDL 09/15/2022 127  mg/dL Final    LDL Calculated 09/15/2022 51  mg/dL Final    Unable to calculate due to one of the following values:  Cholesterol <5  HDL Cholesterol <5  Triglycerides <10 or >400    LDL/HDL 09/15/2022 1.6   Final    Unable to calculate due to one of the following values:  Cholesterol <5  HDL Cholesterol <5  Triglycerides <10 or >400    VLDL 09/15/2022 76  mg/dL Final    Cortisol 09/15/2022 12.8  µg/dL Final      Mornin.30 - 22.40 ug/dL  Afternoon:   3.09 - 16.66 ug/dL    Sodium 09/15/2022 137  136 - 145 mmol/L Final    Potassium 09/15/2022 4.5  3.5 - 5.1 mmol/L Final    Chloride 09/15/2022 100  98 - 107 mmol/L Final    CO2 09/15/2022 28  21 - 32 mmol/L Final    Anion Gap 09/15/2022 14  7 - 16 mmol/L Final    Glucose 09/15/2022 259 (H)  74 - 106 mg/dL Final    BUN 09/15/2022 21 (H)  7 - 18 mg/dL Final    Creatinine 09/15/2022 1.47 (H)  0.55 - 1.02 mg/dL Final    BUN/Creatinine Ratio 09/15/2022 14  6 - 20 Final    Calcium 09/15/2022 9.7  8.5 - 10.1 mg/dL Final    eGFR 09/15/2022 39 (L)  >=60 mL/min/1.73m² Final    TSH 09/15/2022 5.030 (H)  0.358 - 3.740 uIU/mL Final         Assessment and Plan (including Health Maintenance)      Problem List  Smart Sets  Document Outside HM   :    Plan:         Health Maintenance Due   Topic Date Due    Diabetes Urine Screening  Never done    Foot Exam  Never done    TETANUS VACCINE  Never done     Shingles Vaccine (1 of 2) Never done    Eye Exam  12/04/2021    Hemoglobin A1c  02/18/2023       Problem List Items Addressed This Visit          Neuro    Convulsions, unspecified convulsion type       Psychiatric    Recurrent major depressive disorder, in partial remission       Pulmonary    Asbestosis       Cardiac/Vascular    Essential hypertension    Relevant Orders    Basic Metabolic Panel    CBC Auto Differential       Renal/    Stage 3b chronic kidney disease       Endocrine    Type 2 diabetes mellitus with diabetic neuropathy, without long-term current use of insulin - Primary (Chronic)    Relevant Orders    Hemoglobin A1C    Acquired hypothyroidism (Chronic)    Relevant Orders    TSH       Health Maintenance Topics with due status: Not Due       Topic Last Completion Date    Colorectal Cancer Screening 02/03/2015    DEXA Scan 07/12/2021    Mammogram 07/18/2022    Lipid Panel 09/15/2022       Future Appointments   Date Time Provider Department Center   3/10/2023  2:00 PM Robert Gallegos MD [facility]   7/24/2023  8:15 AM [facility]   10/13/2023  2:00 PM AWV NURSE, [facility]ILY MEDICINE [facility]            Signature:  Robert Gallegos MD  [facility]  MEDICAL GROUP OF [facility] FAMILY MEDICINE  [address] [place] [address]  [phone]    Date of encounter: 2/28/23

## 2023-03-01 LAB
ANION GAP SERPL CALCULATED.3IONS-SCNC: 11 MMOL/L (ref 7–16)
BASOPHILS # BLD AUTO: 0.1 K/UL (ref 0–0.2)
BASOPHILS NFR BLD AUTO: 0.9 % (ref 0–1)
BUN SERPL-MCNC: 25 MG/DL (ref 7–18)
BUN/CREAT SERPL: 16 (ref 6–20)
CALCIUM SERPL-MCNC: 10.1 MG/DL (ref 8.5–10.1)
CHLORIDE SERPL-SCNC: 104 MMOL/L (ref 98–107)
CO2 SERPL-SCNC: 26 MMOL/L (ref 21–32)
CREAT SERPL-MCNC: 1.59 MG/DL (ref 0.55–1.02)
DIFFERENTIAL METHOD BLD: ABNORMAL
EGFR (NO RACE VARIABLE) (RUSH/TITUS): 35 ML/MIN/1.73M²
EOSINOPHIL # BLD AUTO: 0.42 K/UL (ref 0–0.5)
EOSINOPHIL NFR BLD AUTO: 3.8 % (ref 1–4)
ERYTHROCYTE [DISTWIDTH] IN BLOOD BY AUTOMATED COUNT: 15.8 % (ref 11.5–14.5)
EST. AVERAGE GLUCOSE BLD GHB EST-MCNC: 154 MG/DL
GLUCOSE SERPL-MCNC: 100 MG/DL (ref 74–106)
HBA1C MFR BLD HPLC: 7.2 % (ref 4.5–6.6)
HCT VFR BLD AUTO: 48.9 % (ref 38–47)
HGB BLD-MCNC: 15.7 G/DL (ref 12–16)
IMM GRANULOCYTES # BLD AUTO: 0.09 K/UL (ref 0–0.04)
IMM GRANULOCYTES NFR BLD: 0.8 % (ref 0–0.4)
LYMPHOCYTES # BLD AUTO: 2.74 K/UL (ref 1–4.8)
LYMPHOCYTES NFR BLD AUTO: 24.8 % (ref 27–41)
MCH RBC QN AUTO: 28.7 PG (ref 27–31)
MCHC RBC AUTO-ENTMCNC: 32.1 G/DL (ref 32–36)
MCV RBC AUTO: 89.4 FL (ref 80–96)
MONOCYTES # BLD AUTO: 1.08 K/UL (ref 0–0.8)
MONOCYTES NFR BLD AUTO: 9.8 % (ref 2–6)
MPC BLD CALC-MCNC: 11.2 FL (ref 9.4–12.4)
NEUTROPHILS # BLD AUTO: 6.64 K/UL (ref 1.8–7.7)
NEUTROPHILS NFR BLD AUTO: 59.9 % (ref 53–65)
NRBC # BLD AUTO: 0 X10E3/UL
NRBC, AUTO (.00): 0 %
PLATELET # BLD AUTO: 234 K/UL (ref 150–400)
POTASSIUM SERPL-SCNC: 4.2 MMOL/L (ref 3.5–5.1)
RBC # BLD AUTO: 5.47 M/UL (ref 4.2–5.4)
SODIUM SERPL-SCNC: 137 MMOL/L (ref 136–145)
TSH SERPL DL<=0.005 MIU/L-ACNC: 2.78 UIU/ML (ref 0.36–3.74)
WBC # BLD AUTO: 11.07 K/UL (ref 4.5–11)

## 2023-03-10 ENCOUNTER — OFFICE VISIT (OUTPATIENT)
Dept: FAMILY MEDICINE | Facility: CLINIC | Age: 68
End: 2023-03-10
Payer: MEDICARE

## 2023-03-10 VITALS
DIASTOLIC BLOOD PRESSURE: 66 MMHG | HEART RATE: 76 BPM | WEIGHT: 180 LBS | HEIGHT: 68 IN | BODY MASS INDEX: 27.28 KG/M2 | SYSTOLIC BLOOD PRESSURE: 112 MMHG

## 2023-03-10 DIAGNOSIS — E78.2 MIXED HYPERLIPIDEMIA: Chronic | ICD-10-CM

## 2023-03-10 DIAGNOSIS — E03.9 ACQUIRED HYPOTHYROIDISM: Chronic | ICD-10-CM

## 2023-03-10 DIAGNOSIS — E11.40 TYPE 2 DIABETES MELLITUS WITH DIABETIC NEUROPATHY, WITHOUT LONG-TERM CURRENT USE OF INSULIN: Primary | Chronic | ICD-10-CM

## 2023-03-10 DIAGNOSIS — N18.32 STAGE 3B CHRONIC KIDNEY DISEASE: Chronic | ICD-10-CM

## 2023-03-10 DIAGNOSIS — I10 ESSENTIAL HYPERTENSION: Chronic | ICD-10-CM

## 2023-03-10 PROCEDURE — 99214 OFFICE O/P EST MOD 30 MIN: CPT | Mod: ,,, | Performed by: FAMILY MEDICINE

## 2023-03-10 PROCEDURE — 99214 PR OFFICE/OUTPT VISIT, EST, LEVL IV, 30-39 MIN: ICD-10-PCS | Mod: ,,, | Performed by: FAMILY MEDICINE

## 2023-03-10 NOTE — PROGRESS NOTES
Robert Gallegos MD   [facility]  MEDICAL GROUP [facility] FAMILY [address] [address]  [phone]      PATIENT NAME: Rosalva Lopez  : 1955  DATE: 3/10/23  MRN: [id number]      Billing Provider: Robert Gallegos MD  Level of Service:   Patient PCP Information       Provider PCP Type    Robert Gallegos MD General            Reason for Visit / Chief Complaint: Follow-up (Follow-up to review lab results)       Update PCP  Update Chief Complaint         History of Present Illness / Problem Focused Workflow     Rosalva Lopez presents to the clinic with Follow-up (Follow-up to review lab results)       67 yo WF here for follow up HTN, DM, hypothyroidism and CKD.  Had labs done at last visit and wants to review the results.  She has no new/acute complaints today.  BP is well controlled.      Follow-up  Pertinent negatives include no abdominal pain, chest pain, chills, coughing, fatigue, fever, sore throat or weakness.     Review of Systems     Review of Systems   Constitutional:  Negative for activity change, chills, fatigue and fever.   HENT:  Negative for sore throat.    Eyes:  Negative for pain.   Respiratory:  Negative for cough, chest tightness and shortness of breath.    Cardiovascular:  Negative for chest pain and palpitations.   Gastrointestinal:  Negative for abdominal pain.   Neurological:  Negative for dizziness, syncope and weakness.   Psychiatric/Behavioral:  Negative for confusion. The patient is not nervous/anxious.       Medical / Social / Family History     Past Medical History:   Diagnosis Date    Depressive disorder     Diabetes     GERD (gastroesophageal reflux disease)     Hypothyroidism     Trigeminal neuralgia of left side of face        Past Surgical History:   Procedure Laterality Date    BREAST BIOPSY      CARPAL TUNNEL RELEASE Left     CHOLECYSTECTOMY      HYSTERECTOMY      INGUINAL HERNIA REPAIR       TONSILLECTOMY      TYMPANOPLASTY         Social History    reports that she has never smoked. She has never been exposed to tobacco smoke. She has never used smokeless tobacco. She reports that she does not drink alcohol and does not use drugs.   Social History     Tobacco Use    Smoking status: Never     Passive exposure: Never    Smokeless tobacco: Never   Substance Use Topics    Alcohol use: Never    Drug use: Never       Family History  Family History   Problem Relation Age of Onset    Heart disease Mother     Hypertension Father     Diabetes Brother     Breast cancer Maternal Grandmother        Medications and Allergies     Medications  Outpatient Medications Marked as Taking for the 3/10/23 encounter (Office Visit) with Robert Gallegos MD   Medication Sig Dispense Refill    albuterol (VENTOLIN HFA) 90 mcg/actuation inhaler Inhale 2 puffs into the lungs every 6 (six) hours as needed for Wheezing or Shortness of Breath. Rescue 8 g 0    amLODIPine (NORVASC) 10 MG tablet Take 1 tablet (10 mg total) by mouth once daily. 30 tablet 11    aspirin (ECOTRIN) 81 MG EC tablet Take 81 mg by mouth once daily.      diclofenac sodium (VOLTAREN) 1 % Gel Apply 2 g topically 4 (four) times daily. 100 g 0    ergocalciferol (ERGOCALCIFEROL) 50,000 unit Cap Take 5,000 Units by mouth once daily.      estradioL (ESTRACE) 1 MG tablet Take 1 tablet (1 mg total) by mouth once daily. 90 tablet 1    gabapentin (NEURONTIN) 100 MG capsule Take two capsules by mouth twice daily. 360 capsule 1    glipiZIDE (GLUCOTROL) 5 MG tablet Take 1 tablet (5 mg total) by mouth 2 (two) times daily with meals. 180 tablet 2    levothyroxine (SYNTHROID) 50 MCG tablet Take 1 tablet (50 mcg total) by mouth before breakfast. 90 tablet 2    LORazepam (ATIVAN) 0.5 MG tablet Take 0.5 mg by mouth every 6 (six) hours as needed for Anxiety.      pantoprazole (PROTONIX) 40 MG tablet Take 1 tablet (40 mg total) by mouth once daily. 90 tablet 1     pravastatin (PRAVACHOL) 40 MG tablet Take 1 tablet (40 mg total) by mouth every evening. 90 tablet 2    semaglutide (OZEMPIC) 0.25 mg or 0.5 mg(2 mg/1.5 mL) pen injector Inject 0.25 mg into the skin every 7 days. 1 pen 5    traZODone (DESYREL) 50 MG tablet Take 1 tablet (50 mg total) by mouth once daily. 90 tablet 1    valproic acid (DEPAKENE) 250 mg capsule Take 1 capsule (250 mg total) by mouth 2 (two) times daily. 180 capsule 2       Allergies  Review of patient's allergies indicates:   Allergen Reactions    Fluzone [id number] tri-whole     Sulfa (sulfonamide antibiotics)     Tamiflu [oseltamivir]        Physical Examination     Vitals:    03/10/23 1424   BP: 112/66   Pulse: 76     Physical Exam  Vitals reviewed.   Constitutional:       Appearance: Normal appearance.   HENT:      Head: Normocephalic and atraumatic.   Eyes:      Extraocular Movements: Extraocular movements intact.      Conjunctiva/sclera: Conjunctivae normal.      Pupils: Pupils are equal, round, and reactive to light.   Cardiovascular:      Rate and Rhythm: Normal rate and regular rhythm.      Heart sounds: Normal heart sounds.   Pulmonary:      Effort: Pulmonary effort is normal.      Breath sounds: Normal breath sounds.   Musculoskeletal:         General: Normal range of motion.      Cervical back: Normal range of motion.   Skin:     General: Skin is warm and dry.   Neurological:      General: No focal deficit present.      Mental Status: She is alert and oriented to person, place, and time.   Psychiatric:         Mood and Affect: Mood normal.         Behavior: Behavior normal.        No visits with results within 1 Week(s) from this visit.   Latest known visit with results is:   Office Visit on 02/28/2023   Component Date Value Ref Range Status    Hemoglobin A1C 02/28/2023 7.2 (H)  4.5 - 6.6 % Final      Normal:               <5.7%  Pre-Diabetic:       5.7% to 6.4%  Diabetic:             >6.4%  Diabetic Goal:     <7%    Estimated  Average Glucose 02/28/2023 154  mg/dL Final    Sodium 02/28/2023 137  136 - 145 mmol/L Final    Potassium 02/28/2023 4.2  3.5 - 5.1 mmol/L Final    Chloride 02/28/2023 104  98 - 107 mmol/L Final    CO2 02/28/2023 26  21 - 32 mmol/L Final    Anion Gap 02/28/2023 11  7 - 16 mmol/L Final    Glucose 02/28/2023 100  74 - 106 mg/dL Final    BUN 02/28/2023 25 (H)  7 - 18 mg/dL Final    Creatinine 02/28/2023 1.59 (H)  0.55 - 1.02 mg/dL Final    BUN/Creatinine Ratio 02/28/2023 16  6 - 20 Final    Calcium 02/28/2023 10.1  8.5 - 10.1 mg/dL Final    eGFR 02/28/2023 35 (L)  >=60 mL/min/1.73m² Final    TSH 02/28/2023 2.780  0.358 - 3.740 uIU/mL Final    WBC 02/28/2023 11.07 (H)  4.50 - 11.00 K/uL Final    RBC 02/28/2023 5.47 (H)  4.20 - 5.40 M/uL Final    Hemoglobin 02/28/2023 15.7  12.0 - 16.0 g/dL Final    Hematocrit 02/28/2023 48.9 (H)  38.0 - 47.0 % Final    MCV 02/28/2023 89.4  80.0 - 96.0 fL Final    MCH 02/28/2023 28.7  27.0 - 31.0 pg Final    MCHC 02/28/2023 32.1  32.0 - 36.0 g/dL Final    RDW 02/28/2023 15.8 (H)  11.5 - 14.5 % Final    Platelet Count 02/28/2023 234  150 - 400 K/uL Final    MPV 02/28/2023 11.2  9.4 - 12.4 fL Final    Neutrophils % 02/28/2023 59.9  53.0 - 65.0 % Final    Lymphocytes % 02/28/2023 24.8 (L)  27.0 - 41.0 % Final    Monocytes % 02/28/2023 9.8 (H)  2.0 - 6.0 % Final    Eosinophils % 02/28/2023 3.8  1.0 - 4.0 % Final    Basophils % 02/28/2023 0.9  0.0 - 1.0 % Final    Immature Granulocytes % 02/28/2023 0.8 (H)  0.0 - 0.4 % Final    nRBC, Auto 02/28/2023 0.0  <=0.0 % Final    Neutrophils, Abs 02/28/2023 6.64  1.80 - 7.70 K/uL Final    Lymphocytes, Absolute 02/28/2023 2.74  1.00 - 4.80 K/uL Final    Monocytes, Absolute 02/28/2023 1.08 (H)  0.00 - 0.80 K/uL Final    Eosinophils, Absolute 02/28/2023 0.42  0.00 - 0.50 K/uL Final    Basophils, Absolute 02/28/2023 0.10  0.00 - 0.20 K/uL Final    Immature Granulocytes, Absolute 02/28/2023 0.09 (H)  0.00 - 0.04 K/uL  Final    nRBC, Absolute 02/28/2023 0.00  <=0.00 x10e3/uL Final    Diff Type 02/28/2023 Auto   Final         Assessment and Plan (including Health Maintenance)      Problem List  Smart Sets  Document Outside HM   :    Plan: labs discussed in detail and all questions and concerns addressed.  A1C continues to improve.  Cr is stable.  TSH is wnl.  Continue current care.          Health Maintenance Due   Topic Date Due    Diabetes Urine Screening  Never done    Foot Exam  Never done    TETANUS VACCINE  Never done    Shingles Vaccine (1 of 2) Never done    Eye Exam  12/04/2021       Problem List Items Addressed This Visit          Cardiac/Vascular    Mixed hyperlipidemia (Chronic)    Essential hypertension       Renal/    Stage 3b chronic kidney disease       Endocrine    Type 2 diabetes mellitus with diabetic neuropathy, without long-term current use of insulin - Primary (Chronic)    Acquired hypothyroidism (Chronic)       Health Maintenance Topics with due status: Not Due       Topic Last Completion Date    Colorectal Cancer Screening 02/03/2015    DEXA Scan 07/12/2021    Mammogram 07/18/2022    Lipid Panel 09/15/2022    Hemoglobin A1c 02/28/2023       Future Appointments   Date Time Provider Department Center   7/24/2023  8:15 AM [facility]   10/13/2023  2:00 PM AW[facility] NURSE, [facility]ILY MEDICINE [facility]            Signature:  MD TONY Murcia [facility]  MEDICAL GROUP [facility] - FAMILY MEDICINE  [address] [place] [address] [place] [address]  [phone]    Date of encounter: 3/10/23

## 2023-03-31 ENCOUNTER — OFFICE VISIT (OUTPATIENT)
Dept: FAMILY MEDICINE | Facility: CLINIC | Age: 68
End: 2023-03-31
Payer: MEDICARE

## 2023-03-31 ENCOUNTER — EXTERNAL CHRONIC CARE MANAGEMENT (OUTPATIENT)
Dept: FAMILY MEDICINE | Facility: CLINIC | Age: 68
End: 2023-03-31
Payer: MEDICARE

## 2023-03-31 VITALS
WEIGHT: 175 LBS | DIASTOLIC BLOOD PRESSURE: 72 MMHG | HEIGHT: 68 IN | SYSTOLIC BLOOD PRESSURE: 136 MMHG | HEART RATE: 71 BPM | BODY MASS INDEX: 26.52 KG/M2

## 2023-03-31 DIAGNOSIS — E11.40 TYPE 2 DIABETES MELLITUS WITH DIABETIC NEUROPATHY, WITHOUT LONG-TERM CURRENT USE OF INSULIN: Primary | Chronic | ICD-10-CM

## 2023-03-31 DIAGNOSIS — L84 CALLUS OF FOOT: Chronic | ICD-10-CM

## 2023-03-31 DIAGNOSIS — G57.93 NEUROPATHY OF BOTH FEET: Chronic | ICD-10-CM

## 2023-03-31 DIAGNOSIS — F33.41 RECURRENT MAJOR DEPRESSIVE DISORDER, IN PARTIAL REMISSION: Chronic | ICD-10-CM

## 2023-03-31 PROCEDURE — 99214 PR OFFICE/OUTPT VISIT, EST, LEVL IV, 30-39 MIN: ICD-10-PCS | Mod: ,,, | Performed by: FAMILY MEDICINE

## 2023-03-31 PROCEDURE — G0511 PR CHRONIC CARE MGMT, RHC OR FQHC ONLY, 20 MINS OR MORE: ICD-10-PCS | Mod: ,,, | Performed by: FAMILY MEDICINE

## 2023-03-31 PROCEDURE — 99214 OFFICE O/P EST MOD 30 MIN: CPT | Mod: ,,, | Performed by: FAMILY MEDICINE

## 2023-03-31 PROCEDURE — G0511 CCM/BHI BY RHC/FQHC 20MIN MO: HCPCS | Mod: ,,, | Performed by: FAMILY MEDICINE

## 2023-03-31 RX ORDER — TRAZODONE HYDROCHLORIDE 50 MG/1
50 TABLET ORAL DAILY
Qty: 90 TABLET | Refills: 1 | Status: SHIPPED | OUTPATIENT
Start: 2023-03-31 | End: 2023-07-18 | Stop reason: SDUPTHER

## 2023-03-31 NOTE — PROGRESS NOTES
Robert Gallegos MD   [facility]  MEDICAL GROUP [facility] FAMILY [address] [address]  [phone]      PATIENT NAME: Rosalva Lopez  : 1955  DATE: 3/31/23  MRN: [id number]      Billing Provider: Robert Gallegos MD  Level of Service:   Patient PCP Information       Provider PCP Type    Robert Gallegos MD General            Reason for Visit / Chief Complaint: Diabetic Foot Exam       Update PCP  Update Chief Complaint         History of Present Illness / Problem Focused Workflow     Rosalva Lopez presents to the clinic with Diabetic Foot Exam       69 yo WF with diabetes and neuropathy of feet.  Here for diabetic foot exam.  Does not have any h/o ulceration.  Still has all digits.  Also treated for multiple other chronic medical problems.  Needs trazodone refilled.  Does not have any new/acute complaints.          Review of Systems     Review of Systems   Constitutional:  Negative for activity change, chills and fever.   HENT:  Negative for sore throat.    Eyes:  Negative for pain.   Respiratory:  Negative for cough, chest tightness and shortness of breath.    Cardiovascular:  Negative for chest pain and palpitations.   Gastrointestinal:  Negative for abdominal pain.   Musculoskeletal:  Positive for arthralgias.   Neurological:  Positive for numbness. Negative for dizziness, syncope and weakness.   Psychiatric/Behavioral:  Negative for confusion.       Medical / Social / Family History     Past Medical History:   Diagnosis Date    Depressive disorder     Diabetes     GERD (gastroesophageal reflux disease)     Hypothyroidism     Trigeminal neuralgia of left side of face        Past Surgical History:   Procedure Laterality Date    BREAST BIOPSY      CARPAL TUNNEL RELEASE Left     CHOLECYSTECTOMY      HYSTERECTOMY      INGUINAL HERNIA REPAIR      TONSILLECTOMY      TYMPANOPLASTY         Social History    reports that she has never smoked. She  has never been exposed to tobacco smoke. She has never used smokeless tobacco. She reports that she does not drink alcohol and does not use drugs.   Social History     Tobacco Use    Smoking status: Never     Passive exposure: Never    Smokeless tobacco: Never   Substance Use Topics    Alcohol use: Never    Drug use: Never       Family History  Family History   Problem Relation Age of Onset    Heart disease Mother     Hypertension Father     Diabetes Brother     Breast cancer Maternal Grandmother        Medications and Allergies     Medications  Outpatient Medications Marked as Taking for the 3/31/23 encounter (Office Visit) with Robert Gallegos MD   Medication Sig Dispense Refill    albuterol (VENTOLIN HFA) 90 mcg/actuation inhaler Inhale 2 puffs into the lungs every 6 (six) hours as needed for Wheezing or Shortness of Breath. Rescue 8 g 0    amLODIPine (NORVASC) 10 MG tablet Take 1 tablet (10 mg total) by mouth once daily. 30 tablet 11    aspirin (ECOTRIN) 81 MG EC tablet Take 81 mg by mouth once daily.      diclofenac sodium (VOLTAREN) 1 % Gel Apply 2 g topically 4 (four) times daily. 100 g 0    ergocalciferol (ERGOCALCIFEROL) 50,000 unit Cap Take 5,000 Units by mouth once daily.      estradioL (ESTRACE) 1 MG tablet Take 1 tablet (1 mg total) by mouth once daily. 90 tablet 1    gabapentin (NEURONTIN) 100 MG capsule Take two capsules by mouth twice daily. 360 capsule 1    glipiZIDE (GLUCOTROL) 5 MG tablet Take 1 tablet (5 mg total) by mouth 2 (two) times daily with meals. 180 tablet 2    levothyroxine (SYNTHROID) 50 MCG tablet Take 1 tablet (50 mcg total) by mouth before breakfast. 90 tablet 2    LORazepam (ATIVAN) 0.5 MG tablet Take 0.5 mg by mouth every 6 (six) hours as needed for Anxiety.      pantoprazole (PROTONIX) 40 MG tablet Take 1 tablet (40 mg total) by mouth once daily. 90 tablet 1    pravastatin (PRAVACHOL) 40 MG tablet Take 1 tablet (40 mg total) by mouth every evening. 90 tablet 2    semaglutide  (OZEMPIC) 0.25 mg or 0.5 mg(2 mg/1.5 mL) pen injector Inject 0.25 mg into the skin every 7 days. 1 pen 5    valproic acid (DEPAKENE) 250 mg capsule Take 1 capsule (250 mg total) by mouth 2 (two) times daily. 180 capsule 2    [DISCONTINUED] traZODone (DESYREL) 50 MG tablet Take 1 tablet (50 mg total) by mouth once daily. 90 tablet 1       Allergies  Review of patient's allergies indicates:   Allergen Reactions    Fluzone [id number] tri-whole     Sulfa (sulfonamide antibiotics)     Tamiflu [oseltamivir]        Physical Examination     Vitals:    03/31/23 1007   BP: 136/72   Pulse: 71     Physical Exam  Vitals reviewed.   Constitutional:       Appearance: Normal appearance.   HENT:      Head: Normocephalic and atraumatic.   Eyes:      Extraocular Movements: Extraocular movements intact.      Conjunctiva/sclera: Conjunctivae normal.      Pupils: Pupils are equal, round, and reactive to light.   Cardiovascular:      Rate and Rhythm: Normal rate and regular rhythm.      Pulses:           Dorsalis pedis pulses are 2+ on the right side and 2+ on the left side.        Posterior tibial pulses are 2+ on the right side and 2+ on the left side.      Heart sounds: Normal heart sounds.   Pulmonary:      Effort: Pulmonary effort is normal.      Breath sounds: Normal breath sounds.   Musculoskeletal:         General: Normal range of motion.      Cervical back: Normal range of motion.      Right foot: Normal range of motion. No deformity, bunion, Charcot foot, foot drop or prominent metatarsal heads.      Left foot: Normal range of motion. No deformity, bunion, Charcot foot, foot drop or prominent metatarsal heads.        Feet:    Feet:      Right foot:      Protective Sensation: 10 sites tested.  10 sites sensed.      Skin integrity: Callus and dry skin present. No ulcer, blister, skin breakdown, erythema, warmth or fissure.      Toenail Condition: Right toenails are normal.      Left foot:      Protective Sensation: 10 sites tested.   10 sites sensed.      Skin integrity: Callus and dry skin present. No ulcer, blister, skin breakdown, erythema, warmth or fissure.      Toenail Condition: Left toenails are normal.      Comments: Decreased sensation in great toes L>R  Skin:     General: Skin is warm and dry.   Neurological:      General: No focal deficit present.      Mental Status: She is alert and oriented to person, place, and time.   Psychiatric:         Mood and Affect: Mood normal.         Behavior: Behavior normal.        Assessment and Plan (including Health Maintenance)      Problem List  Smart Sets  Document Outside HM   :    Plan: rx diabetic shoes/inserts        Health Maintenance Due   Topic Date Due    Diabetes Urine Screening  Never done    TETANUS VACCINE  Never done    Shingles Vaccine (1 of 2) Never done    Eye Exam  12/04/2021       Problem List Items Addressed This Visit          Neuro    Neuropathy of both feet (Chronic)       Psychiatric    Recurrent major depressive disorder, in partial remission    Relevant Medications    traZODone (DESYREL) 50 MG tablet       Endocrine    Type 2 diabetes mellitus with diabetic neuropathy, without long-term current use of insulin - Primary (Chronic)     Other Visit Diagnoses       Callus of foot  (Chronic)               Health Maintenance Topics with due status: Not Due       Topic Last Completion Date    Colorectal Cancer Screening 02/03/2015    DEXA Scan 07/12/2021    Mammogram 07/18/2022    Lipid Panel 09/15/2022    Hemoglobin A1c 02/28/2023    Foot Exam 03/31/2023       Future Appointments   Date Time Provider Department Center   7/24/2023  8:15 AM [facility]   10/13/2023  2:00 PM AW[facility] NURSE, [facility] FAMILY MEDICINE [facility]            Signature:  MD TONY Murcia [facility]  MEDICAL GROUP [facility] - FAMILY MEDICINE  [address] [place] [address]  [phone]    Date of encounter: 3/31/23

## 2023-04-28 DIAGNOSIS — F41.9 ANXIETY: Primary | ICD-10-CM

## 2023-04-28 DIAGNOSIS — E11.40 TYPE 2 DIABETES MELLITUS WITH DIABETIC NEUROPATHY, WITHOUT LONG-TERM CURRENT USE OF INSULIN: Chronic | ICD-10-CM

## 2023-04-28 RX ORDER — LORAZEPAM 0.5 MG/1
0.5 TABLET ORAL EVERY 6 HOURS PRN
Qty: 14 TABLET | Refills: 2 | Status: SHIPPED | OUTPATIENT
Start: 2023-04-28

## 2023-04-28 RX ORDER — GLIPIZIDE 5 MG/1
5 TABLET ORAL 2 TIMES DAILY WITH MEALS
Qty: 180 TABLET | Refills: 2 | Status: SHIPPED | OUTPATIENT
Start: 2023-04-28 | End: 2024-01-31 | Stop reason: SDUPTHER

## 2023-04-30 ENCOUNTER — EXTERNAL CHRONIC CARE MANAGEMENT (OUTPATIENT)
Dept: FAMILY MEDICINE | Facility: CLINIC | Age: 68
End: 2023-04-30
Payer: MEDICARE

## 2023-04-30 PROCEDURE — G0511 PR CHRONIC CARE MGMT, RHC OR FQHC ONLY, 20 MINS OR MORE: ICD-10-PCS | Mod: ,,, | Performed by: FAMILY MEDICINE

## 2023-04-30 PROCEDURE — G0511 CCM/BHI BY RHC/FQHC 20MIN MO: HCPCS | Mod: ,,, | Performed by: FAMILY MEDICINE

## 2023-05-09 ENCOUNTER — OFFICE VISIT (OUTPATIENT)
Dept: FAMILY MEDICINE | Facility: CLINIC | Age: 68
End: 2023-05-09
Payer: MEDICARE

## 2023-05-09 VITALS — DIASTOLIC BLOOD PRESSURE: 76 MMHG | SYSTOLIC BLOOD PRESSURE: 110 MMHG | HEART RATE: 75 BPM

## 2023-05-09 DIAGNOSIS — E11.40 TYPE 2 DIABETES MELLITUS WITH DIABETIC NEUROPATHY, WITHOUT LONG-TERM CURRENT USE OF INSULIN: Primary | Chronic | ICD-10-CM

## 2023-05-09 DIAGNOSIS — M54.50 ACUTE LOW BACK PAIN, UNSPECIFIED BACK PAIN LATERALITY, UNSPECIFIED WHETHER SCIATICA PRESENT: ICD-10-CM

## 2023-05-09 DIAGNOSIS — M54.50 CHRONIC MIDLINE LOW BACK PAIN WITHOUT SCIATICA: Chronic | ICD-10-CM

## 2023-05-09 DIAGNOSIS — G89.29 CHRONIC MIDLINE LOW BACK PAIN WITHOUT SCIATICA: Chronic | ICD-10-CM

## 2023-05-09 PROCEDURE — 99214 OFFICE O/P EST MOD 30 MIN: CPT | Mod: ,,, | Performed by: FAMILY MEDICINE

## 2023-05-09 PROCEDURE — 99214 PR OFFICE/OUTPT VISIT, EST, LEVL IV, 30-39 MIN: ICD-10-PCS | Mod: ,,, | Performed by: FAMILY MEDICINE

## 2023-05-09 PROCEDURE — 96372 THER/PROPH/DIAG INJ SC/IM: CPT | Mod: ,,, | Performed by: FAMILY MEDICINE

## 2023-05-09 PROCEDURE — 96372 PR INJECTION,THERAP/PROPH/DIAG2ST, IM OR SUBCUT: ICD-10-PCS | Mod: ,,, | Performed by: FAMILY MEDICINE

## 2023-05-09 RX ORDER — KETOROLAC TROMETHAMINE 30 MG/ML
30 INJECTION, SOLUTION INTRAMUSCULAR; INTRAVENOUS
Status: COMPLETED | OUTPATIENT
Start: 2023-05-09 | End: 2023-05-09

## 2023-05-09 RX ADMIN — KETOROLAC TROMETHAMINE 30 MG: 30 INJECTION, SOLUTION INTRAMUSCULAR; INTRAVENOUS at 04:05

## 2023-05-09 NOTE — PROGRESS NOTES
Robert Gallegos MD   [facility]  MEDICAL GROUP [facility] FAMILY MEDICINE  [address] [place] [address]  [phone]      PATIENT NAME: Rosalva Lopez  : 1955  DATE: 23  MRN: [id number]      Billing Provider: Robert Gallegos MD  Level of Service:   Patient PCP Information       Provider PCP Type    Robert Gallegos MD General            Reason for Visit / Chief Complaint: Back Pain       Update PCP  Update Chief Complaint         History of Present Illness / Problem Focused Workflow     Rosalva Lopez presents to the clinic with Back Pain       Has chronic/intermittent back pain.  Has been flared up recently after working a lot in her yard.  Has seen chiropractor.  Here today for anti-inflammatory shot.  Is treated for HTN and DM.  BP is well controlled.  Last A1C was 7.2 on 23.  Has mild CKD.      Review of Systems     Review of Systems   Musculoskeletal:  Positive for arthralgias, back pain and myalgias.   Neurological:  Negative for weakness and numbness.      Medical / Social / Family History     Past Medical History:   Diagnosis Date    Depressive disorder     Diabetes     GERD (gastroesophageal reflux disease)     Hypothyroidism     Trigeminal neuralgia of left side of face        Past Surgical History:   Procedure Laterality Date    BREAST BIOPSY      CARPAL TUNNEL RELEASE Left     CHOLECYSTECTOMY      HYSTERECTOMY      INGUINAL HERNIA REPAIR      TONSILLECTOMY      TYMPANOPLASTY         Social History    reports that she has never smoked. She has never been exposed to tobacco smoke. She has never used smokeless tobacco. She reports that she does not drink alcohol and does not use drugs.   Social History     Tobacco Use    Smoking status: Never     Passive exposure: Never    Smokeless tobacco: Never   Substance Use Topics    Alcohol use: Never    Drug use: Never       Family History  Family History   Problem Relation Age of Onset    Heart  disease Mother     Hypertension Father     Diabetes Brother     Breast cancer Maternal Grandmother        Medications and Allergies     Medications  Outpatient Medications Marked as Taking for the 5/9/23 encounter (Office Visit) with Robert Gallegos MD   Medication Sig Dispense Refill    albuterol (VENTOLIN HFA) 90 mcg/actuation inhaler Inhale 2 puffs into the lungs every 6 (six) hours as needed for Wheezing or Shortness of Breath. Rescue 8 g 0    amLODIPine (NORVASC) 10 MG tablet Take 1 tablet (10 mg total) by mouth once daily. 30 tablet 11    aspirin (ECOTRIN) 81 MG EC tablet Take 81 mg by mouth once daily.      diclofenac sodium (VOLTAREN) 1 % Gel Apply 2 g topically 4 (four) times daily. 100 g 0    ergocalciferol (ERGOCALCIFEROL) 50,000 unit Cap Take 5,000 Units by mouth once daily.      estradioL (ESTRACE) 1 MG tablet Take 1 tablet (1 mg total) by mouth once daily. 90 tablet 1    gabapentin (NEURONTIN) 100 MG capsule Take two capsules by mouth twice daily. 360 capsule 1    glipiZIDE (GLUCOTROL) 5 MG tablet Take 1 tablet (5 mg total) by mouth 2 (two) times daily with meals. 180 tablet 2    levothyroxine (SYNTHROID) 50 MCG tablet Take 1 tablet (50 mcg total) by mouth before breakfast. 90 tablet 2    LORazepam (ATIVAN) 0.5 MG tablet Take 1 tablet (0.5 mg total) by mouth every 6 (six) hours as needed for Anxiety. 14 tablet 2    pantoprazole (PROTONIX) 40 MG tablet Take 1 tablet (40 mg total) by mouth once daily. 90 tablet 1    pravastatin (PRAVACHOL) 40 MG tablet Take 1 tablet (40 mg total) by mouth every evening. 90 tablet 2    semaglutide (OZEMPIC) 0.25 mg or 0.5 mg(2 mg/1.5 mL) pen injector Inject 0.25 mg into the skin every 7 days. 1 pen 5    traZODone (DESYREL) 50 MG tablet Take 1 tablet (50 mg total) by mouth once daily. 90 tablet 1    valproic acid (DEPAKENE) 250 mg capsule Take 1 capsule (250 mg total) by mouth 2 (two) times daily. 180 capsule 2       Allergies  Review of patient's allergies indicates:    Allergen Reactions    Fluzone [id number] tri-whole     Sulfa (sulfonamide antibiotics)     Tamiflu [oseltamivir]        Physical Examination     Vitals:    05/09/23 1457   BP: 110/76   Pulse: 75     Physical Exam  Vitals reviewed.   Constitutional:       Appearance: Normal appearance.   HENT:      Head: Normocephalic and atraumatic.   Eyes:      Extraocular Movements: Extraocular movements intact.      Conjunctiva/sclera: Conjunctivae normal.      Pupils: Pupils are equal, round, and reactive to light.   Cardiovascular:      Rate and Rhythm: Normal rate and regular rhythm.      Heart sounds: Normal heart sounds.   Pulmonary:      Effort: Pulmonary effort is normal.      Breath sounds: Normal breath sounds.   Musculoskeletal:         General: Tenderness present. Normal range of motion.      Cervical back: Normal range of motion.   Skin:     General: Skin is warm and dry.   Neurological:      General: No focal deficit present.      Mental Status: She is alert and oriented to person, place, and time.   Psychiatric:         Mood and Affect: Mood normal.         Behavior: Behavior normal.        Assessment and Plan (including Health Maintenance)      Problem List  Smart [employer]  Document Outside HM   :    Plan: low dose toradol IM.          Health Maintenance Due   Topic Date Due    Diabetes Urine Screening  Never done    TETANUS VACCINE  Never done    Shingles Vaccine (1 of 2) Never done    Eye Exam  12/04/2021       Problem List Items Addressed This Visit          Endocrine    Type 2 diabetes mellitus with diabetic neuropathy, without long-term current use of insulin - Primary (Chronic)       Orthopedic    Chronic low back pain (Chronic)     Other Visit Diagnoses       Acute low back pain, unspecified back pain laterality, unspecified whether sciatica present        Relevant Medications    ketorolac injection 30 mg (Completed)            Health Maintenance Topics with due status: Not Due       Topic Last Completion Date     Colorectal Cancer Screening 02/03/2015    DEXA Scan 07/12/2021    Mammogram 07/18/2022    Lipid Panel 09/15/2022    Hemoglobin A1c 02/28/2023    Foot Exam 03/31/2023       Future Appointments   Date Time Provider Department Center   7/24/2023  8:15 AM [facility]   10/13/2023  2:00 PM AW[facility] NURSE, [facility] FAMILY MEDICINE [facility]            Signature:  MD TONY Murcia [facility]  [facility] [place] - FAMILY MEDICINE  [address] [place] [address] [place] [address]  [phone]    Date of encounter: 5/9/23

## 2023-05-20 ENCOUNTER — HOSPITAL ENCOUNTER (EMERGENCY)
Facility: HOSPITAL | Age: 68
Discharge: HOME | End: 2023-05-20
Payer: MEDICARE

## 2023-05-20 VITALS
HEART RATE: 89 BPM | TEMPERATURE: 98 F | RESPIRATION RATE: 20 BRPM | WEIGHT: 171.81 LBS | OXYGEN SATURATION: 89 % | BODY MASS INDEX: 31.62 KG/M2 | HEIGHT: 62 IN | SYSTOLIC BLOOD PRESSURE: 117 MMHG | DIASTOLIC BLOOD PRESSURE: 77 MMHG

## 2023-05-20 DIAGNOSIS — J01.90 ACUTE SINUSITIS, RECURRENCE NOT SPECIFIED, UNSPECIFIED LOCATION: Primary | ICD-10-CM

## 2023-05-20 PROCEDURE — 99283 EMERGENCY DEPT VISIT LOW MDM: CPT

## 2023-05-20 PROCEDURE — 99284 EMERGENCY DEPT VISIT MOD MDM: CPT

## 2023-05-20 RX ORDER — AMOXICILLIN AND CLAVULANATE POTASSIUM 875; 125 MG/1; MG/1
1 TABLET, FILM COATED ORAL 2 TIMES DAILY
Qty: 14 TABLET | Refills: 0 | Status: SHIPPED | OUTPATIENT
Start: 2023-05-20 | End: 2023-07-05 | Stop reason: CLARIF

## 2023-05-20 NOTE — DISCHARGE INSTRUCTIONS
Follow up with your primary care provider in 24 to 48 hours if worsening or not improving.  Nasal steroid and decongestant as directed.  Return to the ER as needed.

## 2023-05-20 NOTE — ED PROVIDER NOTES
Encounter Date: 5/20/2023       History     Chief Complaint   Patient presents with    URI     C/O URI S/S WITH ONSET 6 DAYS AGO. +SORE THROAT/CONGESTION     Patient reports sinus congestion and sore throat x6 days.  Afebrile.  Minimal cough.  No dyspnea.    Review of patient's allergies indicates:   Allergen Reactions    Fluzone [id number] tri-whole     Sulfa (sulfonamide antibiotics)     Tamiflu [oseltamivir]      Past Medical History:   Diagnosis Date    Depressive disorder     Diabetes     GERD (gastroesophageal reflux disease)     Hypothyroidism     Trigeminal neuralgia of left side of face      Past Surgical History:   Procedure Laterality Date    BREAST BIOPSY      CARPAL TUNNEL RELEASE Left     CHOLECYSTECTOMY      HYSTERECTOMY      INGUINAL HERNIA REPAIR      TONSILLECTOMY      TYMPANOPLASTY       Family History   Problem Relation Age of Onset    Heart disease Mother     Hypertension Father     Diabetes Brother     Breast cancer Maternal Grandmother      Social History     Tobacco Use    Smoking status: Never     Passive exposure: Never    Smokeless tobacco: Never   Substance Use Topics    Alcohol use: Never    Drug use: Never     Review of Systems   Constitutional:  Negative for fever.   HENT:  Positive for congestion and sore throat. Negative for trouble swallowing.    Respiratory:  Positive for cough (minimal). Negative for shortness of breath.    Cardiovascular: Negative.  Negative for chest pain.   Genitourinary:  Negative for decreased urine volume.   Neurological: Negative.      Physical Exam     Initial Vitals [05/20/23 1327]   BP Pulse Resp Temp SpO2   117/77 89 20 98.4 °F (36.9 °C) (!) 89 %      MAP       --         Physical Exam    Nursing note and vitals reviewed.  Constitutional: No distress.   HENT:   Head: Normocephalic and atraumatic.   Eyes: EOM are normal.   Nasal mucosa erythematous and edematous.  PND.   Neck: Neck supple.   Cardiovascular:  Normal rate.           Pulmonary/Chest: Breath  sounds normal. No respiratory distress.   Musculoskeletal:         General: Normal range of motion.      Cervical back: Neck supple.     Neurological: She is alert. GCS score is 15. GCS eye subscore is 4. GCS verbal subscore is 5. GCS motor subscore is 6.   Skin: Skin is warm and dry. Capillary refill takes less than 2 seconds.       Medical Screening Exam   See Full Note    ED Course   Procedures  Labs Reviewed - No data to display       Imaging Results    None          Medications - No data to display  Medical Decision Making:   ED Management:  Findings consistent with acute sinusitis                       Clinical Impression:   Final diagnoses:  [J01.90] Acute sinusitis, recurrence not specified, unspecified location (Primary)        ED Disposition Condition    Discharge Stable          ED Prescriptions       Medication Sig Dispense Start Date End Date Auth. Provider    amoxicillin-clavulanate 875-125mg (AUGMENTIN) 875-125 mg per tablet Take 1 tablet by mouth 2 (two) times daily. 14 tablet 5/20/2023 -- DEBORAH Gonsales          Follow-up Information    None          DEBORAH Gonsales  05/20/23 0484

## 2023-05-24 DIAGNOSIS — E78.2 MIXED HYPERLIPIDEMIA: Chronic | ICD-10-CM

## 2023-05-24 RX ORDER — PRAVASTATIN SODIUM 40 MG/1
40 TABLET ORAL NIGHTLY
Qty: 90 TABLET | Refills: 2 | Status: SHIPPED | OUTPATIENT
Start: 2023-05-24 | End: 2024-02-29 | Stop reason: SDUPTHER

## 2023-05-31 ENCOUNTER — EXTERNAL CHRONIC CARE MANAGEMENT (OUTPATIENT)
Dept: FAMILY MEDICINE | Facility: CLINIC | Age: 68
End: 2023-05-31
Payer: MEDICARE

## 2023-05-31 PROCEDURE — G0511 PR CHRONIC CARE MGMT, RHC OR FQHC ONLY, 20 MINS OR MORE: ICD-10-PCS | Mod: ,,, | Performed by: FAMILY MEDICINE

## 2023-05-31 PROCEDURE — G0511 CCM/BHI BY RHC/FQHC 20MIN MO: HCPCS | Mod: ,,, | Performed by: FAMILY MEDICINE

## 2023-06-30 ENCOUNTER — EXTERNAL CHRONIC CARE MANAGEMENT (OUTPATIENT)
Dept: FAMILY MEDICINE | Facility: CLINIC | Age: 68
End: 2023-06-30
Payer: MEDICARE

## 2023-06-30 PROCEDURE — G0511 CCM/BHI BY RHC/FQHC 20MIN MO: HCPCS | Mod: ,,, | Performed by: FAMILY MEDICINE

## 2023-06-30 PROCEDURE — G0511 PR CHRONIC CARE MGMT, RHC OR FQHC ONLY, 20 MINS OR MORE: ICD-10-PCS | Mod: ,,, | Performed by: FAMILY MEDICINE

## 2023-07-05 ENCOUNTER — HOSPITAL ENCOUNTER (INPATIENT)
Facility: HOSPITAL | Age: 68
LOS: 5 days | Discharge: HOME HEALTH | DRG: 195 | End: 2023-07-10
Attending: FAMILY MEDICINE | Admitting: FAMILY MEDICINE
Payer: MEDICARE

## 2023-07-05 DIAGNOSIS — J15.9 COMMUNITY ACQUIRED BACTERIAL PNEUMONIA: ICD-10-CM

## 2023-07-05 DIAGNOSIS — R10.13 EPIGASTRIC PAIN: ICD-10-CM

## 2023-07-05 DIAGNOSIS — J18.9 COMMUNITY ACQUIRED PNEUMONIA, UNSPECIFIED LATERALITY: Primary | ICD-10-CM

## 2023-07-05 DIAGNOSIS — R11.2 NAUSEA AND VOMITING, UNSPECIFIED VOMITING TYPE: ICD-10-CM

## 2023-07-05 PROBLEM — E89.40 ASYMPTOMATIC POSTSURGICAL MENOPAUSE: Chronic | Status: RESOLVED | Noted: 2022-07-26 | Resolved: 2023-07-05

## 2023-07-05 LAB
ACETONE SERPL QL SCN: NEGATIVE
ALBUMIN SERPL BCP-MCNC: 3.5 G/DL (ref 3.5–5)
ALBUMIN/GLOB SERPL: 0.8 {RATIO}
ALP SERPL-CCNC: 87 U/L (ref 55–142)
ALT SERPL W P-5'-P-CCNC: 23 U/L (ref 13–56)
AMYLASE SERPL-CCNC: 99 U/L (ref 25–115)
ANION GAP SERPL CALCULATED.3IONS-SCNC: 19 MMOL/L (ref 7–16)
AST SERPL W P-5'-P-CCNC: 25 U/L (ref 15–37)
BASOPHILS # BLD AUTO: 0.02 K/UL (ref 0–0.2)
BASOPHILS NFR BLD AUTO: 0.1 % (ref 0–1)
BILIRUB SERPL-MCNC: 0.5 MG/DL (ref ?–1.2)
BILIRUB UR QL STRIP: NEGATIVE
BUN SERPL-MCNC: 19 MG/DL (ref 7–18)
BUN/CREAT SERPL: 12 (ref 6–20)
CALCIUM SERPL-MCNC: 9.8 MG/DL (ref 8.5–10.1)
CHLORIDE SERPL-SCNC: 99 MMOL/L (ref 98–107)
CLARITY UR: CLEAR
CO2 SERPL-SCNC: 24 MMOL/L (ref 21–32)
COLOR UR: ABNORMAL
CREAT SERPL-MCNC: 1.54 MG/DL (ref 0.55–1.02)
DIFFERENTIAL METHOD BLD: ABNORMAL
EGFR (NO RACE VARIABLE) (RUSH/TITUS): 36 ML/MIN/1.73M2
EOSINOPHIL # BLD AUTO: 0.09 K/UL (ref 0–0.5)
EOSINOPHIL NFR BLD AUTO: 0.6 % (ref 1–4)
ERYTHROCYTE [DISTWIDTH] IN BLOOD BY AUTOMATED COUNT: 17.2 % (ref 11.5–14.5)
EST. AVERAGE GLUCOSE BLD GHB EST-MCNC: 137 MG/DL
FLUAV AG UPPER RESP QL IA.RAPID: NEGATIVE
FLUBV AG UPPER RESP QL IA.RAPID: NEGATIVE
GLOBULIN SER-MCNC: 4.6 G/DL (ref 2–4)
GLUCOSE SERPL-MCNC: 185 MG/DL (ref 70–105)
GLUCOSE SERPL-MCNC: 202 MG/DL (ref 70–105)
GLUCOSE SERPL-MCNC: 244 MG/DL (ref 70–105)
GLUCOSE SERPL-MCNC: 261 MG/DL (ref 74–106)
GLUCOSE UR STRIP-MCNC: 500 MG/DL
HBA1C MFR BLD HPLC: 6.7 % (ref 4.5–6.6)
HCT VFR BLD AUTO: 52 % (ref 38–47)
HGB BLD-MCNC: 17 G/DL (ref 12–16)
KETONES UR STRIP-SCNC: ABNORMAL MG/DL
LACTATE SERPL-SCNC: 0.4 MMOL/L (ref 0.4–2)
LACTATE SERPL-SCNC: 2.4 MMOL/L (ref 0.4–2)
LACTATE SERPL-SCNC: 2.8 MMOL/L (ref 0.4–2)
LACTATE SERPL-SCNC: 3.2 MMOL/L (ref 0.4–2)
LACTATE SERPL-SCNC: 3.5 MMOL/L (ref 0.4–2)
LEUKOCYTE ESTERASE UR QL STRIP: NEGATIVE
LIPASE SERPL-CCNC: 94 U/L (ref 73–393)
LYMPHOCYTES # BLD AUTO: 0.91 K/UL (ref 1–4.8)
LYMPHOCYTES NFR BLD AUTO: 6.2 % (ref 27–41)
MAGNESIUM SERPL-MCNC: 1.4 MG/DL (ref 1.7–2.3)
MCH RBC QN AUTO: 29.1 PG (ref 27–31)
MCHC RBC AUTO-ENTMCNC: 32.7 G/DL (ref 32–36)
MCV RBC AUTO: 88.9 FL (ref 80–96)
MONOCYTES # BLD AUTO: 0.47 K/UL (ref 0–0.8)
MONOCYTES NFR BLD AUTO: 3.2 % (ref 2–6)
MPC BLD CALC-MCNC: 11.7 FL (ref 9.4–12.4)
NEUTROPHILS # BLD AUTO: 13.21 K/UL (ref 1.8–7.7)
NEUTROPHILS NFR BLD AUTO: 89.9 % (ref 53–65)
NITRITE UR QL STRIP: NEGATIVE
NT-PROBNP SERPL-MCNC: 119 PG/ML (ref 1–125)
PH UR STRIP: 5.5 PH UNITS
PLATELET # BLD AUTO: 226 K/UL (ref 150–400)
POTASSIUM SERPL-SCNC: 4.6 MMOL/L (ref 3.5–5.1)
PROT SERPL-MCNC: 8.1 G/DL (ref 6.4–8.2)
PROT UR QL STRIP: NEGATIVE
RBC # BLD AUTO: 5.85 M/UL (ref 4.2–5.4)
RBC # UR STRIP: NEGATIVE /UL
SARS-COV-2 RDRP RESP QL NAA+PROBE: NEGATIVE
SODIUM SERPL-SCNC: 137 MMOL/L (ref 136–145)
SP GR UR STRIP: 1.01
TROPONIN I SERPL DL<=0.01 NG/ML-MCNC: 5.1 PG/ML
UROBILINOGEN UR STRIP-ACNC: 0.2 MG/DL
WBC # BLD AUTO: 14.7 K/UL (ref 4.5–11)

## 2023-07-05 PROCEDURE — 83735 ASSAY OF MAGNESIUM: CPT | Performed by: NURSE PRACTITIONER

## 2023-07-05 PROCEDURE — 83605 ASSAY OF LACTIC ACID: CPT | Performed by: NURSE PRACTITIONER

## 2023-07-05 PROCEDURE — 93010 ELECTROCARDIOGRAM REPORT: CPT | Performed by: HOSPITALIST

## 2023-07-05 PROCEDURE — 80053 COMPREHEN METABOLIC PANEL: CPT | Performed by: NURSE PRACTITIONER

## 2023-07-05 PROCEDURE — 82009 KETONE BODYS QUAL: CPT | Performed by: NURSE PRACTITIONER

## 2023-07-05 PROCEDURE — 27000221 HC OXYGEN, UP TO 24 HOURS

## 2023-07-05 PROCEDURE — 94761 N-INVAS EAR/PLS OXIMETRY MLT: CPT

## 2023-07-05 PROCEDURE — 87040 BLOOD CULTURE FOR BACTERIA: CPT | Performed by: NURSE PRACTITIONER

## 2023-07-05 PROCEDURE — 96375 TX/PRO/DX INJ NEW DRUG ADDON: CPT

## 2023-07-05 PROCEDURE — 85025 COMPLETE CBC W/AUTO DIFF WBC: CPT | Performed by: NURSE PRACTITIONER

## 2023-07-05 PROCEDURE — 83880 ASSAY OF NATRIURETIC PEPTIDE: CPT | Performed by: NURSE PRACTITIONER

## 2023-07-05 PROCEDURE — 25000003 PHARM REV CODE 250: Performed by: FAMILY MEDICINE

## 2023-07-05 PROCEDURE — 83690 ASSAY OF LIPASE: CPT | Performed by: NURSE PRACTITIONER

## 2023-07-05 PROCEDURE — 93005 ELECTROCARDIOGRAM TRACING: CPT

## 2023-07-05 PROCEDURE — 99900035 HC TECH TIME PER 15 MIN (STAT)

## 2023-07-05 PROCEDURE — 84484 ASSAY OF TROPONIN QUANT: CPT | Performed by: NURSE PRACTITIONER

## 2023-07-05 PROCEDURE — 25000003 PHARM REV CODE 250: Performed by: NURSE PRACTITIONER

## 2023-07-05 PROCEDURE — 87804 INFLUENZA ASSAY W/OPTIC: CPT | Performed by: NURSE PRACTITIONER

## 2023-07-05 PROCEDURE — 96361 HYDRATE IV INFUSION ADD-ON: CPT

## 2023-07-05 PROCEDURE — 87635 SARS-COV-2 COVID-19 AMP PRB: CPT | Performed by: FAMILY MEDICINE

## 2023-07-05 PROCEDURE — 83036 HEMOGLOBIN GLYCOSYLATED A1C: CPT | Performed by: NURSE PRACTITIONER

## 2023-07-05 PROCEDURE — 99285 EMERGENCY DEPT VISIT HI MDM: CPT | Mod: GF | Performed by: NURSE PRACTITIONER

## 2023-07-05 PROCEDURE — 81003 URINALYSIS AUTO W/O SCOPE: CPT | Performed by: NURSE PRACTITIONER

## 2023-07-05 PROCEDURE — 99222 1ST HOSP IP/OBS MODERATE 55: CPT | Mod: AI,,, | Performed by: FAMILY MEDICINE

## 2023-07-05 PROCEDURE — 94640 AIRWAY INHALATION TREATMENT: CPT

## 2023-07-05 PROCEDURE — 99222 PR INITIAL HOSPITAL CARE,LEVL II: ICD-10-PCS | Mod: AI,,, | Performed by: FAMILY MEDICINE

## 2023-07-05 PROCEDURE — 25000242 PHARM REV CODE 250 ALT 637 W/ HCPCS: Performed by: NURSE PRACTITIONER

## 2023-07-05 PROCEDURE — 82150 ASSAY OF AMYLASE: CPT | Performed by: NURSE PRACTITIONER

## 2023-07-05 PROCEDURE — 11000001 HC ACUTE MED/SURG PRIVATE ROOM

## 2023-07-05 PROCEDURE — 63600175 PHARM REV CODE 636 W HCPCS: Performed by: NURSE PRACTITIONER

## 2023-07-05 PROCEDURE — 82962 GLUCOSE BLOOD TEST: CPT

## 2023-07-05 PROCEDURE — 99285 EMERGENCY DEPT VISIT HI MDM: CPT | Mod: 25

## 2023-07-05 PROCEDURE — 96374 THER/PROPH/DIAG INJ IV PUSH: CPT

## 2023-07-05 RX ORDER — AMOXICILLIN 250 MG
1 CAPSULE ORAL 2 TIMES DAILY PRN
Status: DISCONTINUED | OUTPATIENT
Start: 2023-07-05 | End: 2023-07-10 | Stop reason: HOSPADM

## 2023-07-05 RX ORDER — IBUPROFEN 200 MG
16 TABLET ORAL
Status: DISCONTINUED | OUTPATIENT
Start: 2023-07-05 | End: 2023-07-10 | Stop reason: HOSPADM

## 2023-07-05 RX ORDER — VALPROIC ACID 250 MG/1
250 CAPSULE, LIQUID FILLED ORAL NIGHTLY
Status: DISCONTINUED | OUTPATIENT
Start: 2023-07-05 | End: 2023-07-10 | Stop reason: HOSPADM

## 2023-07-05 RX ORDER — SODIUM CHLORIDE 0.9 % (FLUSH) 0.9 %
10 SYRINGE (ML) INJECTION
Status: DISCONTINUED | OUTPATIENT
Start: 2023-07-05 | End: 2023-07-10 | Stop reason: HOSPADM

## 2023-07-05 RX ORDER — CHOLECALCIFEROL (VITAMIN D3) 25 MCG
1000 TABLET ORAL DAILY
Status: DISCONTINUED | OUTPATIENT
Start: 2023-07-05 | End: 2023-07-10 | Stop reason: HOSPADM

## 2023-07-05 RX ORDER — ENOXAPARIN SODIUM 100 MG/ML
40 INJECTION SUBCUTANEOUS EVERY 24 HOURS
Status: DISCONTINUED | OUTPATIENT
Start: 2023-07-05 | End: 2023-07-10 | Stop reason: HOSPADM

## 2023-07-05 RX ORDER — ESTRADIOL 0.5 MG/1
1 TABLET ORAL DAILY
Status: DISCONTINUED | OUTPATIENT
Start: 2023-07-05 | End: 2023-07-10 | Stop reason: HOSPADM

## 2023-07-05 RX ORDER — LEVOTHYROXINE SODIUM 50 UG/1
50 TABLET ORAL
Status: DISCONTINUED | OUTPATIENT
Start: 2023-07-06 | End: 2023-07-10 | Stop reason: HOSPADM

## 2023-07-05 RX ORDER — INSULIN ASPART 100 [IU]/ML
0-5 INJECTION, SOLUTION INTRAVENOUS; SUBCUTANEOUS
Status: DISCONTINUED | OUTPATIENT
Start: 2023-07-05 | End: 2023-07-05 | Stop reason: SDUPTHER

## 2023-07-05 RX ORDER — AMLODIPINE BESYLATE 5 MG/1
10 TABLET ORAL DAILY
Status: DISCONTINUED | OUTPATIENT
Start: 2023-07-05 | End: 2023-07-10 | Stop reason: HOSPADM

## 2023-07-05 RX ORDER — IBUPROFEN 200 MG
24 TABLET ORAL
Status: DISCONTINUED | OUTPATIENT
Start: 2023-07-05 | End: 2023-07-10 | Stop reason: HOSPADM

## 2023-07-05 RX ORDER — GABAPENTIN 100 MG/1
100 CAPSULE ORAL 2 TIMES DAILY
Status: DISCONTINUED | OUTPATIENT
Start: 2023-07-05 | End: 2023-07-10 | Stop reason: HOSPADM

## 2023-07-05 RX ORDER — PANTOPRAZOLE SODIUM 40 MG/1
40 TABLET, DELAYED RELEASE ORAL DAILY
Status: DISCONTINUED | OUTPATIENT
Start: 2023-07-05 | End: 2023-07-10 | Stop reason: HOSPADM

## 2023-07-05 RX ORDER — FAMOTIDINE 10 MG/ML
20 INJECTION INTRAVENOUS DAILY
Status: DISCONTINUED | OUTPATIENT
Start: 2023-07-05 | End: 2023-07-05

## 2023-07-05 RX ORDER — PRAVASTATIN SODIUM 40 MG/1
40 TABLET ORAL NIGHTLY
Status: DISCONTINUED | OUTPATIENT
Start: 2023-07-05 | End: 2023-07-10 | Stop reason: HOSPADM

## 2023-07-05 RX ORDER — ASPIRIN 81 MG/1
81 TABLET ORAL DAILY
Status: DISCONTINUED | OUTPATIENT
Start: 2023-07-05 | End: 2023-07-10 | Stop reason: HOSPADM

## 2023-07-05 RX ORDER — ACETAMINOPHEN 325 MG/1
650 TABLET ORAL EVERY 8 HOURS PRN
Status: DISCONTINUED | OUTPATIENT
Start: 2023-07-05 | End: 2023-07-10 | Stop reason: HOSPADM

## 2023-07-05 RX ORDER — TRAZODONE HYDROCHLORIDE 50 MG/1
50 TABLET ORAL NIGHTLY
Status: DISCONTINUED | OUTPATIENT
Start: 2023-07-05 | End: 2023-07-10 | Stop reason: HOSPADM

## 2023-07-05 RX ORDER — TALC
6 POWDER (GRAM) TOPICAL NIGHTLY PRN
Status: DISCONTINUED | OUTPATIENT
Start: 2023-07-05 | End: 2023-07-10 | Stop reason: HOSPADM

## 2023-07-05 RX ORDER — IBUPROFEN 200 MG
24 TABLET ORAL
Status: DISCONTINUED | OUTPATIENT
Start: 2023-07-05 | End: 2023-07-05

## 2023-07-05 RX ORDER — IBUPROFEN 200 MG
16 TABLET ORAL
Status: DISCONTINUED | OUTPATIENT
Start: 2023-07-05 | End: 2023-07-05

## 2023-07-05 RX ORDER — ESTRADIOL 0.5 MG/1
1 TABLET ORAL DAILY
Status: DISCONTINUED | OUTPATIENT
Start: 2023-07-06 | End: 2023-07-05

## 2023-07-05 RX ORDER — GLUCAGON 1 MG
1 KIT INJECTION
Status: DISCONTINUED | OUTPATIENT
Start: 2023-07-05 | End: 2023-07-05

## 2023-07-05 RX ORDER — INSULIN ASPART 100 [IU]/ML
0-5 INJECTION, SOLUTION INTRAVENOUS; SUBCUTANEOUS
Status: DISCONTINUED | OUTPATIENT
Start: 2023-07-05 | End: 2023-07-05

## 2023-07-05 RX ORDER — LORAZEPAM 0.5 MG/1
0.5 TABLET ORAL EVERY 6 HOURS PRN
Status: DISCONTINUED | OUTPATIENT
Start: 2023-07-05 | End: 2023-07-10 | Stop reason: HOSPADM

## 2023-07-05 RX ORDER — IPRATROPIUM BROMIDE AND ALBUTEROL SULFATE 2.5; .5 MG/3ML; MG/3ML
3 SOLUTION RESPIRATORY (INHALATION) EVERY 6 HOURS
Status: DISCONTINUED | OUTPATIENT
Start: 2023-07-05 | End: 2023-07-10 | Stop reason: HOSPADM

## 2023-07-05 RX ORDER — SODIUM CHLORIDE 9 MG/ML
1000 INJECTION, SOLUTION INTRAVENOUS CONTINUOUS
Status: DISPENSED | OUTPATIENT
Start: 2023-07-05 | End: 2023-07-05

## 2023-07-05 RX ORDER — GLUCAGON 1 MG
1 KIT INJECTION
Status: DISCONTINUED | OUTPATIENT
Start: 2023-07-05 | End: 2023-07-10 | Stop reason: HOSPADM

## 2023-07-05 RX ORDER — GLIPIZIDE 5 MG/1
5 TABLET ORAL 2 TIMES DAILY WITH MEALS
Status: DISCONTINUED | OUTPATIENT
Start: 2023-07-05 | End: 2023-07-05

## 2023-07-05 RX ORDER — ONDANSETRON 2 MG/ML
4 INJECTION INTRAMUSCULAR; INTRAVENOUS
Status: COMPLETED | OUTPATIENT
Start: 2023-07-05 | End: 2023-07-05

## 2023-07-05 RX ORDER — ONDANSETRON 2 MG/ML
4 INJECTION INTRAMUSCULAR; INTRAVENOUS EVERY 8 HOURS PRN
Status: DISCONTINUED | OUTPATIENT
Start: 2023-07-05 | End: 2023-07-10 | Stop reason: HOSPADM

## 2023-07-05 RX ADMIN — VALPROIC ACID 250 MG: 250 CAPSULE, LIQUID FILLED ORAL at 08:07

## 2023-07-05 RX ADMIN — SODIUM CHLORIDE 1000 ML: 9 INJECTION, SOLUTION INTRAVENOUS at 11:07

## 2023-07-05 RX ADMIN — CEFTRIAXONE SODIUM 1 G: 1 INJECTION, POWDER, FOR SOLUTION INTRAMUSCULAR; INTRAVENOUS at 09:07

## 2023-07-05 RX ADMIN — ASPIRIN 81 MG: 81 TABLET, COATED ORAL at 12:07

## 2023-07-05 RX ADMIN — ESTRADIOL 1 MG: 0.5 TABLET ORAL at 12:07

## 2023-07-05 RX ADMIN — GABAPENTIN 100 MG: 100 CAPSULE ORAL at 12:07

## 2023-07-05 RX ADMIN — ACETAMINOPHEN 650 MG: 325 TABLET ORAL at 03:07

## 2023-07-05 RX ADMIN — PANTOPRAZOLE SODIUM 40 MG: 40 TABLET, DELAYED RELEASE ORAL at 12:07

## 2023-07-05 RX ADMIN — CHOLECALCIFEROL TAB 25 MCG (1000 UNIT) 1000 UNITS: 25 TAB at 12:07

## 2023-07-05 RX ADMIN — TRAZODONE HYDROCHLORIDE 50 MG: 50 TABLET ORAL at 08:07

## 2023-07-05 RX ADMIN — AZITHROMYCIN MONOHYDRATE 500 MG: 500 INJECTION, POWDER, LYOPHILIZED, FOR SOLUTION INTRAVENOUS at 09:07

## 2023-07-05 RX ADMIN — IPRATROPIUM BROMIDE AND ALBUTEROL SULFATE 3 ML: 2.5; .5 SOLUTION RESPIRATORY (INHALATION) at 07:07

## 2023-07-05 RX ADMIN — SODIUM CHLORIDE 1000 ML: 9 INJECTION, SOLUTION INTRAVENOUS at 08:07

## 2023-07-05 RX ADMIN — IPRATROPIUM BROMIDE AND ALBUTEROL SULFATE 3 ML: 2.5; .5 SOLUTION RESPIRATORY (INHALATION) at 12:07

## 2023-07-05 RX ADMIN — ONDANSETRON 4 MG: 2 INJECTION INTRAMUSCULAR; INTRAVENOUS at 07:07

## 2023-07-05 RX ADMIN — GABAPENTIN 100 MG: 100 CAPSULE ORAL at 08:07

## 2023-07-05 RX ADMIN — AMLODIPINE BESYLATE 10 MG: 5 TABLET ORAL at 12:07

## 2023-07-05 RX ADMIN — PRAVASTATIN SODIUM 40 MG: 40 TABLET ORAL at 08:07

## 2023-07-05 NOTE — SUBJECTIVE & OBJECTIVE
Past Medical History:   Diagnosis Date    Depressive disorder     Diabetes     GERD (gastroesophageal reflux disease)     Hypothyroidism     Trigeminal neuralgia of left side of face        Past Surgical History:   Procedure Laterality Date    BREAST BIOPSY      CARPAL TUNNEL RELEASE Left     CHOLECYSTECTOMY      HYSTERECTOMY      INGUINAL HERNIA REPAIR      TONSILLECTOMY      TYMPANOPLASTY         Review of patient's allergies indicates:   Allergen Reactions    Fluzone [id number] tri-whole     Sulfa (sulfonamide antibiotics)     Tamiflu [oseltamivir]        No current facility-administered medications on file prior to encounter.     Current Outpatient Medications on File Prior to Encounter   Medication Sig    albuterol (VENTOLIN HFA) 90 mcg/actuation inhaler Inhale 2 puffs into the lungs every 6 (six) hours as needed for Wheezing or Shortness of Breath. Rescue    amLODIPine (NORVASC) 10 MG tablet Take 1 tablet (10 mg total) by mouth once daily.    aspirin (ECOTRIN) 81 MG EC tablet Take 81 mg by mouth once daily.    diclofenac sodium (VOLTAREN) 1 % Gel Apply 2 g topically 4 (four) times daily.    ergocalciferol (ERGOCALCIFEROL) 50,000 unit Cap Take 5,000 Units by mouth once daily.    estradioL (ESTRACE) 1 MG tablet Take 1 tablet (1 mg total) by mouth once daily.    gabapentin (NEURONTIN) 100 MG capsule Take two capsules by mouth twice daily.    glipiZIDE (GLUCOTROL) 5 MG tablet Take 1 tablet (5 mg total) by mouth 2 (two) times daily with meals.    levothyroxine (SYNTHROID) 50 MCG tablet Take 1 tablet (50 mcg total) by mouth before breakfast.    LORazepam (ATIVAN) 0.5 MG tablet Take 1 tablet (0.5 mg total) by mouth every 6 (six) hours as needed for Anxiety.    pantoprazole (PROTONIX) 40 MG tablet Take 1 tablet (40 mg total) by mouth once daily.    pravastatin (PRAVACHOL) 40 MG tablet Take 1 tablet (40 mg total) by mouth every evening.    semaglutide (OZEMPIC) 0.25 mg or 0.5 mg(2 mg/1.5 mL) pen injector Inject 0.25 mg  into the skin every 7 days.    traZODone (DESYREL) 50 MG tablet Take 1 tablet (50 mg total) by mouth once daily.    valproic acid (DEPAKENE) 250 mg capsule Take 1 capsule (250 mg total) by mouth 2 (two) times daily.    [DISCONTINUED] amoxicillin-clavulanate 875-125mg (AUGMENTIN) 875-125 mg per tablet Take 1 tablet by mouth 2 (two) times daily.     Family History       Problem Relation (Age of Onset)    Breast cancer Maternal Grandmother    Diabetes Brother    Heart disease Mother    Hypertension Father          Tobacco Use    Smoking status: Never     Passive exposure: Never    Smokeless tobacco: Never   Substance and Sexual Activity    Alcohol use: Never    Drug use: Never    Sexual activity: Yes     Review of Systems   Constitutional:  Positive for appetite change.   Eyes:         Wears glasses   Respiratory:  Positive for cough and shortness of breath.    Cardiovascular:  Negative for chest pain.   Gastrointestinal:  Positive for nausea and vomiting. Negative for constipation and diarrhea.        BM this morning   States she vomited around 3 x starting at 0500 this morning   Genitourinary:  Negative for difficulty urinating and dysuria.   Musculoskeletal:  Negative for arthralgias.   Neurological:  Positive for weakness.   Objective:     Vital Signs (Most Recent):  Temp: 98.6 °F (37 °C) (07/05/23 1041)  Pulse: 102 (07/05/23 1041)  Resp: 20 (07/05/23 1041)  BP: 123/62 (07/05/23 1041)  SpO2: (!) 90 % (07/05/23 1041) Vital Signs (24h Range):  Temp:  [98.6 °F (37 °C)-99.4 °F (37.4 °C)] 98.6 °F (37 °C)  Pulse:  [100-123] 102  Resp:  [20-26] 20  SpO2:  [88 %-92 %] 90 %  BP: (120-148)/(58-70) 123/62     Weight: 78 kg (172 lb)  Body mass index is 31.46 kg/m².     Physical Exam  HENT:      Head: Normocephalic.      Mouth/Throat:      Mouth: Mucous membranes are moist.   Cardiovascular:      Rate and Rhythm: Normal rate and regular rhythm.      Pulses: Normal pulses.      Heart sounds: Normal heart sounds.   Pulmonary:       Effort: Pulmonary effort is normal.      Breath sounds: Normal breath sounds.      Comments: 02 per nc at 2 ltiers  Diminished breath sounds bilaterally  Abdominal:      General: Bowel sounds are normal.      Palpations: Abdomen is soft.   Musculoskeletal:         General: Normal range of motion.   Skin:     General: Skin is warm and dry.   Neurological:      Mental Status: She is alert and oriented to person, place, and time.   Psychiatric:         Mood and Affect: Mood normal.              Significant Labs: All pertinent labs within the past 24 hours have been reviewed.  Recent Lab Results  (Last 5 results in the past 24 hours)        07/05/23  1017   07/05/23  0938   07/05/23  0908   07/05/23  0831   07/05/23  0807        Influenza B, Molecular   Negative             Acetone, Bld       Negative         Appearance, UA     Clear           Bilirubin (UA)     Negative           Color, UA     Light Yellow           Glucose, UA     500           Influenza A   Negative             Ketones, UA     Trace           Lactate, Dylan 2.8  Comment: A repeat order for Lactic Acid has been placed for collection in 2 hours.         3.5  Comment: A repeat order for Lactic Acid has been placed for collection in 2 hours.       Leukocytes, UA     Negative           Magnesium       1.4         NITRITE UA     Negative           Occult Blood UA     Negative           pH, UA     5.5           Protein, UA     Negative           Specific [employer], UA     1.015           UROBILINOGEN UA     0.2                                  Significant Imaging: I have reviewed all pertinent imaging results/findings within the past 24 hours.

## 2023-07-05 NOTE — ED PROVIDER NOTES
"Encounter Date: 7/5/2023       History     Chief Complaint   Patient presents with    Abdominal Pain    Vomiting    Nausea     Presented with c/o epigastric pain and generalized abd "soreness" along with n/v x5 and diarrhea x1 since last night. Denies blood or mucus in emesis or stool. Notes has had similar symptoms during previous bouts of diverticulitis. States is concerned that the not fully cooked steak that she ate yesterday caused this. Reports that she ate potato salad as well but no other people got sick from it. She denies chest pain or dyspnea. O2 sat upon arrival was 88 on RA. Pt reports that she has ended up with pneumonia in the past from vomiting. Denies known lung disease. PMH HTN, DM, GERD, CHF (but is no longer on meds for CHF). Previous appendectomy, cholecystectomy, hysterectomy.    Review of patient's allergies indicates:   Allergen Reactions    Fluzone [id number] tri-whole     Sulfa (sulfonamide antibiotics)     Tamiflu [oseltamivir]      Past Medical History:   Diagnosis Date    Depressive disorder     Diabetes     GERD (gastroesophageal reflux disease)     Hypothyroidism     Trigeminal neuralgia of left side of face      Past Surgical History:   Procedure Laterality Date    BREAST BIOPSY      CARPAL TUNNEL RELEASE Left     CHOLECYSTECTOMY      HYSTERECTOMY      INGUINAL HERNIA REPAIR      TONSILLECTOMY      TYMPANOPLASTY       Family History   Problem Relation Age of Onset    Heart disease Mother     Hypertension Father     Diabetes Brother     Breast cancer Maternal Grandmother      Social History     Tobacco Use    Smoking status: Never     Passive exposure: Never    Smokeless tobacco: Never   Substance Use Topics    Alcohol use: Never    Drug use: Never     Review of Systems   Constitutional:  Positive for activity change, appetite change, chills and fatigue. Negative for fever.   HENT:  Negative for congestion, sore throat and trouble swallowing.    Eyes:  Negative for visual disturbance. "   Respiratory:  Negative for cough, chest tightness and shortness of breath.    Cardiovascular:  Negative for chest pain and palpitations.   Gastrointestinal:  Positive for abdominal pain, diarrhea (x1 loose stool), nausea and vomiting (x5).     Physical Exam     Initial Vitals [07/05/23 0731]   BP Pulse Resp Temp SpO2   (!) 148/70 (!) 123 (!) 24 99.4 °F (37.4 °C) (!) 88 %      MAP       --         Physical Exam    Constitutional: She appears well-developed and well-nourished. No distress.   HENT:   Head: Normocephalic.   Right Ear: External ear normal.   Left Ear: External ear normal.   Nose: Nose normal.   Mouth/Throat: Oropharynx is clear and moist.   Eyes: Conjunctivae and EOM are normal. Pupils are equal, round, and reactive to light.   Neck: Neck supple. No JVD present.   Normal range of motion.  Cardiovascular:  Normal rate, regular rhythm, normal heart sounds and intact distal pulses.           No murmur heard.  Pulmonary/Chest: Breath sounds normal. She has no rhonchi. She has no rales.   Diminished breath sounds bilat     Abdominal: Abdomen is soft. Bowel sounds are normal. There is abdominal tenderness (generalized diffuse).   Musculoskeletal:         General: No tenderness or edema. Normal range of motion.      Cervical back: Normal range of motion and neck supple.     Neurological: She is alert and oriented to person, place, and time. She has normal strength. GCS score is 15. GCS eye subscore is 4. GCS verbal subscore is 5. GCS motor subscore is 6.   Skin: Skin is warm and dry. Capillary refill takes less than 2 seconds.   Psychiatric: She has a normal mood and affect.       Medical Screening Exam   See Full Note    ED Course   Procedures  Labs Reviewed   COMPREHENSIVE METABOLIC PANEL - Abnormal; Notable for the following components:       Result Value    Anion Gap 19 (*)     Glucose 261 (*)     BUN 19 (*)     Creatinine 1.54 (*)     Globulin 4.6 (*)     eGFR 36 (*)     All other components within  normal limits   URINALYSIS, REFLEX TO URINE CULTURE - Abnormal; Notable for the following components:    Glucose,  (*)     All other components within normal limits   CBC WITH DIFFERENTIAL - Abnormal; Notable for the following components:    WBC 14.70 (*)     RBC 5.85 (*)     Hemoglobin 17.0 (*)     Hematocrit 52.0 (*)     RDW 17.2 (*)     Neutrophils % 89.9 (*)     Lymphocytes % 6.2 (*)     Neutrophils, Abs 13.21 (*)     Lymphocytes, Absolute 0.91 (*)     Eosinophils % 0.6 (*)     All other components within normal limits   LACTIC ACID, PLASMA - Abnormal; Notable for the following components:    Lactic Acid 3.5 (*)     All other components within normal limits   MAGNESIUM - Abnormal; Notable for the following components:    Magnesium 1.4 (*)     All other components within normal limits   POCT GLUCOSE MONITORING CONTINUOUS - Abnormal; Notable for the following components:    POC Glucose 244 (*)     All other components within normal limits   RAPID INFLUENZA A/B - Normal   LIPASE - Normal   AMYLASE - Normal   TROPONIN I - Normal   NT-PRO NATRIURETIC PEPTIDE - Normal   CULTURE, BLOOD   CULTURE, BLOOD   CBC W/ AUTO DIFFERENTIAL    Narrative:     The following orders were created for panel order CBC auto differential.  Procedure                               Abnormality         Status                     ---------                               -----------         ------                     CBC with Differential[[id number]]        Abnormal            Final result                 Please view results for these tests on the individual orders.   ACETONE   SARS-COV-2 (COVID-19) QUALITATIVE PCR   LACTIC ACID, PLASMA        ECG Results              EKG 12-lead (In process)  Result time 07/05/23 07:55:21      In process by Interface, Lab In [facility] (07/05/23 07:55:21)                   Narrative:    Test Reason : R10.13,    Vent. Rate : 119 BPM     Atrial Rate : 119 BPM     P-R Int : 130 ms          QRS Dur : 074 ms       QT Int : 306 ms       P-R-T Axes : 000 174 126 degrees     QTc Int : 430 ms     Suspect arm lead reversal, interpretation assumes no reversal  Sinus tachycardia  Low voltage QRS  Lateral infarct (cited on or before 20-AUG-2022)  Inferior infarct ,age undetermined  Abnormal ECG  When compared with ECG of 20-AUG-2022 23:31,  Significant changes have occurred    Referred By: [email or web address]   SELF           Confirmed By:                                   Imaging Results              CT Chest Abdomen Pelvis Without Contrast (XPD) (Final result)  Result time 07/05/23 09:13:43      Final result by Scooby Mcghee MD (07/05/23 09:13:43)                   Impression:      Diffuse multifocal pneumonia of the right lung more so than the left.    No acute abnormality of the abdomen or pelvis.      Electronically signed by: Scooby Mcghee  Date:    07/05/2023  Time:    09:13               Narrative:    EXAMINATION:  CT CHEST ABDOMEN PELVIS WITHOUT CONTRAST(XPD)    CLINICAL HISTORY:  pneumonia, hypoxia, abd pain, n/v;    TECHNIQUE:  Low dose axial images, sagittal and coronal reformations were obtained from the thoracic inlet to the pubic symphysis.  Oral contrast was not administered. The CT examination was performed using one or more of the following dose reduction techniques: Automated exposure control, adjustment of the mA and kV according to patient's size, use of acute or iterative reconstruction techniques.    COMPARISON:  Abdomen and pelvic CT 09/16/2019.  Chest radiograph earlier today.    FINDINGS:  Chest: Diffuse patchy opacities are seen involving the base of the right upper lobe, the right middle lobe, and the right lower lobe.  There are a few opacities in the left lung base.  No pneumothorax or pleural effusion.    Heart normal in size.  Coronary and thoracic aortic calcifications are seen.  Borderline mediastinal lymph nodes.    Abdomen and pelvis: Numerous calcifications of the liver and spleen.  No focal worrisome  abnormality of the liver.  Gallbladder absent.  Adrenals and kidneys appear normal.  Pancreas unremarkable.    No pneumoperitoneum.  No ascites.  Diffuse colonic diverticulosis without evidence of diverticulitis.  No bowel obstruction or acute bowel abnormality.  Vascular calcifications.  Urinary bladder unremarkable.  No adnexal lesion.  Uterus absent.    Musculoskeletal: No acute fracture or osseous lesion.                                       X-Ray Chest AP Portable (Final result)  Result time 07/05/23 07:56:26      Final result by Scooby Mcghee MD (07/05/23 07:56:26)                   Impression:      Development of bilateral opacities right more so than left suggestive of pneumonia.      Electronically signed by: Scooby Mcghee  Date:    07/05/2023  Time:    07:56               Narrative:    EXAMINATION:  XR CHEST AP PORTABLE    CLINICAL HISTORY:  Chest Pain;    TECHNIQUE:  Single frontal view of the chest was performed.    COMPARISON:  08/21/2022    FINDINGS:  Cardiac silhouette is obscured secondary to bilateral parenchymal and interstitial opacities right more so than left.  No pneumothorax or pleural effusion.                                    X-Rays:   Independently Interpreted Readings:   Chest X-Ray: Development of bilateral opacities right more so than left suggestive of pneumonia.   Other Readings:  Ct chest abd pelvis: Diffuse multifocal pneumonia of the right lung more so than the left.     No acute abnormality of the abdomen or pelvis  Medications   azithromycin (ZITHROMAX) 500 mg in dextrose 5 % (D5W) 250 mL IVPB (Vial-Mate) (500 mg Intravenous New Bag 7/5/23 0958)   ondansetron injection 4 mg (4 mg Intravenous Given 7/5/23 0741)   sodium chloride 0.9% bolus 1,000 mL 1,000 mL (0 mLs Intravenous Stopped 7/5/23 0932)   cefTRIAXone (ROCEPHIN) 1 g in dextrose 5 % in water (D5W) 5 % 100 mL IVPB (MB+) (0 g Intravenous Stopped 7/5/23 0955)     Medical Decision Making:   ED Management:  Presented with c/o  "epigastric pain and generalized abd "soreness" along with n/v x5 and diarrhea x1 since last night. Denies blood or mucus in emesis or stool. Notes has had similar symptoms during previous bouts of diverticulitis. States is concerned that the not fully cooked steak that she ate yesterday caused this. Reports that she ate potato salad as well but no other people got sick from it. She denies chest pain or dyspnea. O2 sat upon arrival was 88 on RA. Pt reports that she has ended up with pneumonia in the past from vomiting. Denies known lung disease. PMH HTN, DM, GERD, CHF (but is no longer on meds for CHF). Previous appendectomy, cholecystectomy, hysterectomy.    O2 sat 88% upon arrival. Started on O2 at 3L/min up to 91%.     EKG ordered and reviewed. It shows sinus tach with rate of 119. No changes from previous on 8/20/22. Labs ordered and reviewed. WBC 10948 with neutrophil 89.9%, H&H 17 and 52, plt [id number], BUN 19, creatinine 1.54 (similar to previous labs), glucose 261, CO2 19, AST 25, ALT 25, lipase 94, amylase 99, K+ 4.6, Na 137. Chest xray shows bilat pneumonia. Lacitic acid 3.5, Mg 1.4, serum ketone negative, blood cultures x 2 pending. CT chest abd pelvis noncontrasted ordered and reviewed. It shows diffuse multifocal pneumonia of the right lung more so than the left.     No acute abnormality of the abdomen or pelvis    NS bolus 1 liter given. Ondanseron 4 mg IVP given. Rocephin 1 Gm IVPB and azithromycin 500 mg IVPB given in the ED.  Pt stable. O2 sat 92% on O2 at 3L/min per NC. /58. HR improved 100. COVID 19 and flu are negative.  Discussed pt's case with Dr. Bonner. He accepted for admission for community acquired pneumonia.           ED Course as of 07/05/23 1039   Wed Jul 05, 2023   0755 WBC(!): 14.70 [DP]   0755 Hemoglobin(!): 17.0 [DP]   0755 Hematocrit(!): 52.0 [DP]   0755 Neutrophils Relative(!): 89.9 [DP]   0755 POC Glucose(!): 244 [DP]   0755 Platelets: 226 [DP]   0826 Sodium: 137 [DP]   0829 " Potassium: 4.6 [DP]   0829 Glucose(!): 261 [DP]   0829 BUN(!): 19 [DP]   0829 Creatinine(!): 1.54 [DP]   0829 ALT: 23 [DP]   0830 AST: 25 [DP]   0830 Lipase: 94 [DP]   0830 Amylase: 99 [DP]   0830 NT-proBNP: 119 [DP]   0830 Troponin I High Sensitivity: 5.1 [DP]      ED Course User Index  [DP] Jerilyn Diaz NP                Clinical Impression:   Final diagnoses:  [R10.13] Epigastric pain  [J18.9] Community acquired pneumonia, unspecified laterality (Primary)  [R11.2] Nausea and vomiting, unspecified vomiting type        ED Disposition Condition    Admit Stable                Jerilyn Diaz NP  07/05/23 0944       Jerilyn Diaz NP  07/05/23 1035

## 2023-07-05 NOTE — NURSING
Received patient from ED via wheelchair. NAD noted. Resp even et unlabored. Voiced no concerns at this time.  at bedside. Bed in low position. CB in reach. Will cont to monitor.

## 2023-07-05 NOTE — H&P
"[facility]  Hospital Medicine  History & Physical    Patient Name: Rosalva Lopez  MRN: [id number]  Patient Class: IP- Inpatient  Admission Date: 7/5/2023  Attending Physician: Cheko Bonner IV, DO   Primary Care Provider: Robert Gallegos MD         Patient information was obtained from patient and ER records.     Subjective:     Principal Problem:Community acquired pneumonia    Chief Complaint:   Chief Complaint   Patient presents with    Abdominal Pain    Vomiting    Nausea        HPI:     Presented with c/o epigastric pain and generalized abd "soreness" along with n/v x5 and diarrhea x1 since last night. Denies blood or mucus in emesis or stool. Notes has had similar symptoms during previous bouts of diverticulitis. States is concerned that the not fully cooked steak that she ate yesterday caused this. Reports that she ate potato salad as well but no other people got sick from it. She denies chest pain or dyspnea. O2 sat upon arrival was 88 on RA. Pt reports that she has ended up with pneumonia in the past from vomiting. Denies known lung disease. PMH HTN, DM, GERD, CHF (but is no longer on meds for CHF). Previous appendectomy, cholecystectomy, hysterectomy.     O2 sat 88% upon arrival. Started on O2 at 3L/min up to 91%.      EKG ordered and reviewed. It shows sinus tach with rate of 119. No changes from previous on 8/20/22. Labs ordered and reviewed. WBC 17085 with neutrophil 89.9%, H&H 17 and 52, plt [id number], BUN 19, creatinine 1.54 (similar to previous labs), glucose 261, CO2 19, AST 25, ALT 25, lipase 94, amylase 99, K+ 4.6, Na 137. Chest xray shows bilat pneumonia. Lacitic acid 3.5, Mg 1.4, serum ketone negative, blood cultures x 2 pending. CT chest abd pelvis noncontrasted ordered and reviewed. It shows diffuse multifocal pneumonia of the right lung more so than the left.     No acute abnormality of the abdomen or pelvis     NS bolus 1 liter given. Ondanseron 4 mg IVP " given. Rocephin 1 Gm IVPB and azithromycin 500 mg IVPB given in the ED.  Pt stable. O2 sat 92% on O2 at 3L/min per NC. /58. HR improved 100. COVID 19 and flu are negative.  Discussed pt's case with Dr. Bonner. He accepted for admission for community acquired pneumonia.         Admitted to inpatient, spouse accompanying her on arrival. Talked with her code status and she chose Full code.       Past Medical History:   Diagnosis Date    Depressive disorder     Diabetes     GERD (gastroesophageal reflux disease)     Hypothyroidism     Trigeminal neuralgia of left side of face        Past Surgical History:   Procedure Laterality Date    BREAST BIOPSY      CARPAL TUNNEL RELEASE Left     CHOLECYSTECTOMY      HYSTERECTOMY      INGUINAL HERNIA REPAIR      TONSILLECTOMY      TYMPANOPLASTY         Review of patient's allergies indicates:   Allergen Reactions    Fluzone [id number] tri-whole     Sulfa (sulfonamide antibiotics)     Tamiflu [oseltamivir]        No current facility-administered medications on file prior to encounter.     Current Outpatient Medications on File Prior to Encounter   Medication Sig    albuterol (VENTOLIN HFA) 90 mcg/actuation inhaler Inhale 2 puffs into the lungs every 6 (six) hours as needed for Wheezing or Shortness of Breath. Rescue    amLODIPine (NORVASC) 10 MG tablet Take 1 tablet (10 mg total) by mouth once daily.    aspirin (ECOTRIN) 81 MG EC tablet Take 81 mg by mouth once daily.    diclofenac sodium (VOLTAREN) 1 % Gel Apply 2 g topically 4 (four) times daily.    ergocalciferol (ERGOCALCIFEROL) 50,000 unit Cap Take 5,000 Units by mouth once daily.    estradioL (ESTRACE) 1 MG tablet Take 1 tablet (1 mg total) by mouth once daily.    gabapentin (NEURONTIN) 100 MG capsule Take two capsules by mouth twice daily.    glipiZIDE (GLUCOTROL) 5 MG tablet Take 1 tablet (5 mg total) by mouth 2 (two) times daily with meals.    levothyroxine (SYNTHROID) 50 MCG tablet Take 1 tablet (50 mcg total) by mouth  before breakfast.    LORazepam (ATIVAN) 0.5 MG tablet Take 1 tablet (0.5 mg total) by mouth every 6 (six) hours as needed for Anxiety.    pantoprazole (PROTONIX) 40 MG tablet Take 1 tablet (40 mg total) by mouth once daily.    pravastatin (PRAVACHOL) 40 MG tablet Take 1 tablet (40 mg total) by mouth every evening.    semaglutide (OZEMPIC) 0.25 mg or 0.5 mg(2 mg/1.5 mL) pen injector Inject 0.25 mg into the skin every 7 days.    traZODone (DESYREL) 50 MG tablet Take 1 tablet (50 mg total) by mouth once daily.    valproic acid (DEPAKENE) 250 mg capsule Take 1 capsule (250 mg total) by mouth 2 (two) times daily.    [DISCONTINUED] amoxicillin-clavulanate 875-125mg (AUGMENTIN) 875-125 mg per tablet Take 1 tablet by mouth 2 (two) times daily.     Family History       Problem Relation (Age of Onset)    Breast cancer Maternal Grandmother    Diabetes Brother    Heart disease Mother    Hypertension Father          Tobacco Use    Smoking status: Never     Passive exposure: Never    Smokeless tobacco: Never   Substance and Sexual Activity    Alcohol use: Never    Drug use: Never    Sexual activity: Yes     Review of Systems   Constitutional:  Positive for appetite change.   Eyes:         Wears glasses   Respiratory:  Positive for cough and shortness of breath.    Cardiovascular:  Negative for chest pain.   Gastrointestinal:  Positive for nausea and vomiting. Negative for constipation and diarrhea.        BM this morning   States she vomited around 3 x starting at 0500 this morning   Genitourinary:  Negative for difficulty urinating and dysuria.   Musculoskeletal:  Negative for arthralgias.   Neurological:  Positive for weakness.   Objective:     Vital Signs (Most Recent):  Temp: 98.6 °F (37 °C) (07/05/23 1041)  Pulse: 102 (07/05/23 1041)  Resp: 20 (07/05/23 1041)  BP: 123/62 (07/05/23 1041)  SpO2: (!) 90 % (07/05/23 1041) Vital Signs (24h Range):  Temp:  [98.6 °F (37 °C)-99.4 °F (37.4 °C)] 98.6 °F (37 °C)  Pulse:  [100-123]  102  Resp:  [20-26] 20  SpO2:  [88 %-92 %] 90 %  BP: (120-148)/(58-70) 123/62     Weight: 78 kg (172 lb)  Body mass index is 31.46 kg/m².     Physical Exam  HENT:      Head: Normocephalic.      Mouth/Throat:      Mouth: Mucous membranes are moist.   Cardiovascular:      Rate and Rhythm: Normal rate and regular rhythm.      Pulses: Normal pulses.      Heart sounds: Normal heart sounds.   Pulmonary:      Effort: Pulmonary effort is normal.      Breath sounds: Normal breath sounds.      Comments: 02 per nc at 2 ltiers  Diminished breath sounds bilaterally  Abdominal:      General: Bowel sounds are normal.      Palpations: Abdomen is soft.   Musculoskeletal:         General: Normal range of motion.   Skin:     General: Skin is warm and dry.   Neurological:      Mental Status: She is alert and oriented to person, place, and time.   Psychiatric:         Mood and Affect: Mood normal.              Significant Labs: All pertinent labs within the past 24 hours have been reviewed.  Recent Lab Results  (Last 5 results in the past 24 hours)        07/05/23  1017   07/05/23  0938   07/05/23  0908   07/05/23  0831   07/05/23  0807        Influenza B, Molecular   Negative             Acetone, Bld       Negative         Appearance, UA     Clear           Bilirubin (UA)     Negative           Color, UA     Light Yellow           Glucose, UA     500           Influenza A   Negative             Ketones, UA     Trace           Lactate, Dylan 2.8  Comment: A repeat order for Lactic Acid has been placed for collection in 2 hours.         3.5  Comment: A repeat order for Lactic Acid has been placed for collection in 2 hours.       Leukocytes, UA     Negative           Magnesium       1.4         NITRITE UA     Negative           Occult Blood UA     Negative           pH, UA     5.5           Protein, UA     Negative           Specific [place], UA     1.015           UROBILINOGEN UA     0.2                                  Significant  Imaging: I have reviewed all pertinent imaging results/findings within the past 24 hours.    Assessment/Plan:     Community Acquired Pneumonia    07/05/23 continue rocephin and azithromycin   Duonebs prn   02 per nc at 2 liters        Trigeminal Neuralgia     07/05/23 continue valproic acid 250 mg po bid    Gastroesophageal reflux disease without esophagitis    07/05/23 continue protonix 40 mg po daily    Acquired hypothyroidism    07/05/23 continue levothyroxine 50 mcg po daily      Essential hypertension  07/05/23 continue amlodipine 10 mg po daily        Recurrent major depressive disorder, in partial remission  07/05/23 continue trazodone 50 mg po daily        Neuropathy of both feet    07/05/23 gabapentin 100 mg po BID     Mixed hyperlipidemia  07/05/23 continue pravastatin 40 mg po at hs daily      Type 2 diabetes mellitus with diabetic neuropathy, without long-term current use of insulin    Lab Results   Component Value Date    HGBA1C 7.2 (H) 02/28/2023       Hold Oral hypoglycemics while patient is in the hospital. Home med is glipizide 5 mg po bid  07/05/23 SSI coverage      VTE Risk Mitigation (From admission, onward)           Ordered     enoxaparin injection 40 mg  Daily         07/05/23 1110     IP VTE HIGH RISK PATIENT  Once         07/05/23 1110     Place ELLY hose  Until discontinued         07/05/23 1110                               Cheko Bonner IV, DO  Department of Hospital Medicine  [facility]

## 2023-07-05 NOTE — HPI
"    Presented with c/o epigastric pain and generalized abd "soreness" along with n/v x5 and diarrhea x1 since last night. Denies blood or mucus in emesis or stool. Notes has had similar symptoms during previous bouts of diverticulitis. States is concerned that the not fully cooked steak that she ate yesterday caused this. Reports that she ate potato salad as well but no other people got sick from it. She denies chest pain or dyspnea. O2 sat upon arrival was 88 on RA. Pt reports that she has ended up with pneumonia in the past from vomiting. Denies known lung disease. PMH HTN, DM, GERD, CHF (but is no longer on meds for CHF). Previous appendectomy, cholecystectomy, hysterectomy.     O2 sat 88% upon arrival. Started on O2 at 3L/min up to 91%.      EKG ordered and reviewed. It shows sinus tach with rate of 119. No changes from previous on 8/20/22. Labs ordered and reviewed. WBC 69433 with neutrophil 89.9%, H&H 17 and 52, plt [id number], BUN 19, creatinine 1.54 (similar to previous labs), glucose 261, CO2 19, AST 25, ALT 25, lipase 94, amylase 99, K+ 4.6, Na 137. Chest xray shows bilat pneumonia. Lacitic acid 3.5, Mg 1.4, serum ketone negative, blood cultures x 2 pending. CT chest abd pelvis noncontrasted ordered and reviewed. It shows diffuse multifocal pneumonia of the right lung more so than the left.     No acute abnormality of the abdomen or pelvis     NS bolus 1 liter given. Ondanseron 4 mg IVP given. Rocephin 1 Gm IVPB and azithromycin 500 mg IVPB given in the ED.  Pt stable. O2 sat 92% on O2 at 3L/min per NC. /58. HR improved 100. COVID 19 and flu are negative.  Discussed pt's case with Dr. Bonner. He accepted for admission for community acquired pneumonia.         Admitted to inpatient, spouse accompanying her on arrival. Talked with her code status and she chose Full code.   "

## 2023-07-06 LAB
ANION GAP SERPL CALCULATED.3IONS-SCNC: 10 MMOL/L (ref 7–16)
BASOPHILS # BLD AUTO: 0.03 K/UL (ref 0–0.2)
BASOPHILS NFR BLD AUTO: 0.2 % (ref 0–1)
BUN SERPL-MCNC: 16 MG/DL (ref 7–18)
BUN/CREAT SERPL: 12 (ref 6–20)
CALCIUM SERPL-MCNC: 8.8 MG/DL (ref 8.5–10.1)
CHLORIDE SERPL-SCNC: 105 MMOL/L (ref 98–107)
CO2 SERPL-SCNC: 30 MMOL/L (ref 21–32)
CREAT SERPL-MCNC: 1.38 MG/DL (ref 0.55–1.02)
DIFFERENTIAL METHOD BLD: ABNORMAL
EGFR (NO RACE VARIABLE) (RUSH/TITUS): 42 ML/MIN/1.73M2
EOSINOPHIL # BLD AUTO: 0.16 K/UL (ref 0–0.5)
EOSINOPHIL NFR BLD AUTO: 0.8 % (ref 1–4)
ERYTHROCYTE [DISTWIDTH] IN BLOOD BY AUTOMATED COUNT: 15.8 % (ref 11.5–14.5)
GLUCOSE SERPL-MCNC: 118 MG/DL (ref 70–105)
GLUCOSE SERPL-MCNC: 133 MG/DL (ref 70–105)
GLUCOSE SERPL-MCNC: 140 MG/DL (ref 70–105)
GLUCOSE SERPL-MCNC: 153 MG/DL (ref 74–106)
GLUCOSE SERPL-MCNC: 191 MG/DL (ref 70–105)
HCT VFR BLD AUTO: 38.9 % (ref 38–47)
HGB BLD-MCNC: 12.5 G/DL (ref 12–16)
LYMPHOCYTES # BLD AUTO: 2.56 K/UL (ref 1–4.8)
LYMPHOCYTES NFR BLD AUTO: 13.3 % (ref 27–41)
MCH RBC QN AUTO: 29.1 PG (ref 27–31)
MCHC RBC AUTO-ENTMCNC: 32.1 G/DL (ref 32–36)
MCV RBC AUTO: 90.7 FL (ref 80–96)
MONOCYTES # BLD AUTO: 1.13 K/UL (ref 0–0.8)
MONOCYTES NFR BLD AUTO: 5.9 % (ref 2–6)
MPC BLD CALC-MCNC: 11 FL (ref 9.4–12.4)
NEUTROPHILS # BLD AUTO: 15.36 K/UL (ref 1.8–7.7)
NEUTROPHILS NFR BLD AUTO: 79.8 % (ref 53–65)
PLATELET # BLD AUTO: 186 K/UL (ref 150–400)
POTASSIUM SERPL-SCNC: 4.3 MMOL/L (ref 3.5–5.1)
RBC # BLD AUTO: 4.29 M/UL (ref 4.2–5.4)
SARS-COV-2 RDRP RESP QL NAA+PROBE: NEGATIVE
SODIUM SERPL-SCNC: 141 MMOL/L (ref 136–145)
WBC # BLD AUTO: 19.24 K/UL (ref 4.5–11)

## 2023-07-06 PROCEDURE — 94761 N-INVAS EAR/PLS OXIMETRY MLT: CPT

## 2023-07-06 PROCEDURE — 11000001 HC ACUTE MED/SURG PRIVATE ROOM

## 2023-07-06 PROCEDURE — 99900035 HC TECH TIME PER 15 MIN (STAT)

## 2023-07-06 PROCEDURE — 25000003 PHARM REV CODE 250: Performed by: FAMILY MEDICINE

## 2023-07-06 PROCEDURE — 99232 PR SUBSEQUENT HOSPITAL CARE,LEVL II: ICD-10-PCS | Mod: ,,, | Performed by: FAMILY MEDICINE

## 2023-07-06 PROCEDURE — 63600175 PHARM REV CODE 636 W HCPCS: Performed by: NURSE PRACTITIONER

## 2023-07-06 PROCEDURE — 25000003 PHARM REV CODE 250: Performed by: NURSE PRACTITIONER

## 2023-07-06 PROCEDURE — 25000242 PHARM REV CODE 250 ALT 637 W/ HCPCS: Performed by: NURSE PRACTITIONER

## 2023-07-06 PROCEDURE — 80048 BASIC METABOLIC PNL TOTAL CA: CPT | Performed by: NURSE PRACTITIONER

## 2023-07-06 PROCEDURE — 82962 GLUCOSE BLOOD TEST: CPT

## 2023-07-06 PROCEDURE — 99232 SBSQ HOSP IP/OBS MODERATE 35: CPT | Mod: ,,, | Performed by: FAMILY MEDICINE

## 2023-07-06 PROCEDURE — 94640 AIRWAY INHALATION TREATMENT: CPT

## 2023-07-06 PROCEDURE — 27000982 HC MATTRESS, MATRIX LAL RENTAL

## 2023-07-06 PROCEDURE — 85025 COMPLETE CBC W/AUTO DIFF WBC: CPT | Performed by: NURSE PRACTITIONER

## 2023-07-06 PROCEDURE — 87635 SARS-COV-2 COVID-19 AMP PRB: CPT | Performed by: NURSE PRACTITIONER

## 2023-07-06 PROCEDURE — 27000944

## 2023-07-06 PROCEDURE — 27000221 HC OXYGEN, UP TO 24 HOURS

## 2023-07-06 RX ORDER — SODIUM CHLORIDE 9 MG/ML
INJECTION, SOLUTION INTRAVENOUS CONTINUOUS
Status: DISCONTINUED | OUTPATIENT
Start: 2023-07-06 | End: 2023-07-10 | Stop reason: HOSPADM

## 2023-07-06 RX ADMIN — ASPIRIN 81 MG: 81 TABLET, COATED ORAL at 08:07

## 2023-07-06 RX ADMIN — IPRATROPIUM BROMIDE AND ALBUTEROL SULFATE 3 ML: 2.5; .5 SOLUTION RESPIRATORY (INHALATION) at 07:07

## 2023-07-06 RX ADMIN — ACETAMINOPHEN 650 MG: 325 TABLET ORAL at 05:07

## 2023-07-06 RX ADMIN — PRAVASTATIN SODIUM 40 MG: 40 TABLET ORAL at 09:07

## 2023-07-06 RX ADMIN — TRAZODONE HYDROCHLORIDE 50 MG: 50 TABLET ORAL at 09:07

## 2023-07-06 RX ADMIN — IPRATROPIUM BROMIDE AND ALBUTEROL SULFATE 3 ML: 2.5; .5 SOLUTION RESPIRATORY (INHALATION) at 01:07

## 2023-07-06 RX ADMIN — VALPROIC ACID 250 MG: 250 CAPSULE, LIQUID FILLED ORAL at 09:07

## 2023-07-06 RX ADMIN — CHOLECALCIFEROL TAB 25 MCG (1000 UNIT) 1000 UNITS: 25 TAB at 08:07

## 2023-07-06 RX ADMIN — GABAPENTIN 100 MG: 100 CAPSULE ORAL at 09:07

## 2023-07-06 RX ADMIN — LEVOTHYROXINE SODIUM 50 MCG: 0.05 TABLET ORAL at 06:07

## 2023-07-06 RX ADMIN — SODIUM CHLORIDE: 9 INJECTION, SOLUTION INTRAVENOUS at 10:07

## 2023-07-06 RX ADMIN — SODIUM CHLORIDE: 9 INJECTION, SOLUTION INTRAVENOUS at 09:07

## 2023-07-06 RX ADMIN — AMLODIPINE BESYLATE 10 MG: 5 TABLET ORAL at 08:07

## 2023-07-06 RX ADMIN — AZITHROMYCIN MONOHYDRATE 500 MG: 500 INJECTION, POWDER, LYOPHILIZED, FOR SOLUTION INTRAVENOUS at 10:07

## 2023-07-06 RX ADMIN — ESTRADIOL 1 MG: 0.5 TABLET ORAL at 08:07

## 2023-07-06 RX ADMIN — CEFTRIAXONE 1 G: 1 INJECTION, POWDER, FOR SOLUTION INTRAMUSCULAR; INTRAVENOUS at 09:07

## 2023-07-06 RX ADMIN — PANTOPRAZOLE SODIUM 40 MG: 40 TABLET, DELAYED RELEASE ORAL at 09:07

## 2023-07-06 NOTE — PLAN OF CARE
Problem: Adult Inpatient Plan of Care  Goal: Absence of Hospital-Acquired Illness or Injury  Outcome: Ongoing, Progressing  Intervention: Prevent Infection  Flowsheets (Taken 7/6/2023 0050)  Infection Prevention:   hand hygiene promoted   personal protective equipment utilized     Problem: Diabetes Comorbidity  Goal: Blood Glucose Level Within Targeted Range  Outcome: Ongoing, Progressing  Intervention: Monitor and Manage Glycemia  Flowsheets (Taken 7/6/2023 0050)  Glycemic Management: blood glucose monitored     Problem: Fluid Imbalance (Pneumonia)  Goal: Fluid Balance  Outcome: Ongoing, Progressing  Intervention: Monitor and Manage Fluid Balance  Flowsheets (Taken 7/6/2023 0050)  Fluid/Electrolyte Management:   intravenous fluids adjusted   oral rehydration therapy initiated

## 2023-07-06 NOTE — SUBJECTIVE & OBJECTIVE
Interval History: CAP    Review of Systems   Constitutional:  Positive for appetite change.   Respiratory:  Positive for cough. Negative for choking.         Reports occasional cough   Cardiovascular:  Negative for chest pain.   Gastrointestinal:  Negative for constipation, diarrhea, nausea and vomiting.   Objective:     Vital Signs (Most Recent):  Temp: 98.1 °F (36.7 °C) (07/06/23 1149)  Pulse: 90 (07/06/23 1149)  Resp: 20 (07/06/23 1149)  BP: (!) 116/57 (07/06/23 1149)  SpO2: (!) 93 % (07/06/23 1149) Vital Signs (24h Range):  Temp:  [97.9 °F (36.6 °C)-98.8 °F (37.1 °C)] 98.1 °F (36.7 °C)  Pulse:  [] 90  Resp:  [16-20] 20  SpO2:  [90 %-93 %] 93 %  BP: (108-133)/(56-65) 116/57     Weight: 78 kg (172 lb)  Body mass index is 31.46 kg/m².    Intake/Output Summary (Last 24 hours) at 7/6/2023 1235  Last data filed at 7/6/2023 0909  Gross per 24 hour   Intake 840 ml   Output --   Net 840 ml         Physical Exam  Constitutional:       Appearance: She is ill-appearing.   HENT:      Nose: Nose normal.   Cardiovascular:      Rate and Rhythm: Normal rate and regular rhythm.      Pulses: Normal pulses.      Heart sounds: Normal heart sounds.   Pulmonary:      Effort: Pulmonary effort is normal.      Breath sounds: Normal breath sounds.      Comments: 02 per nc at 3 liters   Abdominal:      General: Bowel sounds are normal.      Palpations: Abdomen is soft.   Musculoskeletal:         General: Normal range of motion.   Skin:     General: Skin is warm and dry.   Neurological:      Mental Status: She is alert and oriented to person, place, and time.      Motor: Weakness present.   Psychiatric:         Mood and Affect: Mood normal.           Significant Labs: All pertinent labs within the past 24 hours have been reviewed.  Recent Lab Results  (Last 5 results in the past 24 hours)        07/06/23  1142   07/06/23  0723   07/06/23  0722   07/06/23  0634   07/05/23 2021        Anion Gap   10             Baso #     0.03            Basophil %     0.2           BUN   16             BUN/CREAT RATIO   12             Calcium   8.8             Chloride   105             CO2   30             Creatinine   1.38             Differential Type     Auto           eGFR   42             Eos #     0.16           Eosinophil %     0.8           Glucose   153             Hematocrit     38.9           Hemoglobin     12.5           Lymph #     2.56           Lymph %     13.3           MCH     29.1           MCHC     32.1           MCV     90.7           Mono #     1.13           Mono %     5.9           MPV     11.0           Neutrophils, Abs     15.36           Neutrophils Relative     79.8           Platelets     186           POC Glucose 191       133   202       Potassium   4.3             RBC     4.29           RDW     15.8           Sodium   141             WBC     19.24                                  Significant Imaging: I have reviewed all pertinent imaging results/findings within the past 24 hours.

## 2023-07-06 NOTE — PLAN OF CARE
Problem: Adult Inpatient Plan of Care  Goal: Plan of Care Review  Outcome: Ongoing, Progressing  Goal: Patient-Specific Goal (Individualized)  Outcome: Ongoing, Progressing  Goal: Absence of Hospital-Acquired Illness or Injury  Outcome: Ongoing, Progressing  Goal: Optimal Comfort and Wellbeing  Outcome: Ongoing, Progressing  Goal: Readiness for Transition of Care  Outcome: Ongoing, Progressing     Problem: Diabetes Comorbidity  Goal: Blood Glucose Level Within Targeted Range  Outcome: Ongoing, Progressing     Problem: Fluid Imbalance (Pneumonia)  Goal: Fluid Balance  Outcome: Ongoing, Progressing     Problem: Infection (Pneumonia)  Goal: Resolution of Infection Signs and Symptoms  Outcome: Ongoing, Progressing     Problem: Respiratory Compromise (Pneumonia)  Goal: Effective Oxygenation and Ventilation  Outcome: Ongoing, Progressing     Problem: Fall Injury Risk  Goal: Absence of Fall and Fall-Related Injury  Outcome: Ongoing, Progressing

## 2023-07-06 NOTE — PLAN OF CARE
[facility]  Initial Discharge Assessment       Primary Care Provider: Robert Gallegos MD    Admission Diagnosis: Epigastric pain [R10.13]  Community acquired pneumonia, unspecified laterality [J18.9]  Nausea and vomiting, unspecified vomiting type [R11.2]    Admission Date: 7/5/2023  Expected Discharge Date:     Transition of Care Barriers: (P) None    Payor: MEDICARE / Plan: MEDICARE PART A & B / Product Type: Government /     Extended Emergency Contact Information  Primary Emergency Contact: Krish Wolf  Mobile Phone: [phone]  Relation: Brother  Preferred language: English   needed? No    Discharge Plan A: (P) Home, Home with family         [facility] [address] [place] [address] - [address] [facility]  [address] [facility] [address]  Phone: [phone] Fax: [phone]    [facility] [place] [facility] - [facility] AT [address] [facility]  [place] [address]  Phone: [phone] Fax: [phone]    [facility] [place] - [address]  [address] [facility] [place] [address]  Phone: [phone] Fax: [phone]    [place], [place] - [address]  [address]  [place] [address]  Phone: [phone] Fax: [phone]      Initial Assessment (most recent)       Adult Discharge Assessment - 07/06/23 1248          Discharge Assessment    Assessment Type Discharge Planning Assessment (P)      Confirmed/corrected address, phone number and insurance Yes (P)      Confirmed Demographics Correct on Facesheet (P)      Source of Information patient (P)      People in Home spouse (P)      Do you expect to return to your current living situation? Yes (P)      Do you have help at home or someone to help you manage your care at home? Yes (P)      Who are your caregiver(s) and their phone number(s)?  (P)      Current cognitive status: Alert/Oriented (P)       Home Layout Able to live on 1st floor (P)      Equipment Currently Used at Home none (P)      Patient currently being followed by outpatient case management? No (P)      Do you currently have service(s) that help you manage your care at home? No (P)      Who is going to help you get home at discharge?  (P)      How do you get to doctors appointments? car, drives self (P)      Discharge Plan A Home;Home with family (P)      DME Needed Upon Discharge  none (P)      Discharge Plan discussed with: Patient (P)      Transition of Care Barriers None (P)                    Spoke with Ms. Lopez she says she lives with her .  She doesn't use any type of equipment nor require services from home health.  She intends to return home at discharge with her .

## 2023-07-06 NOTE — PROGRESS NOTES
"[facility]  Hospital Medicine  Progress Note    Patient Name: Rosalva Lopez  MRN: [id number]  Patient Class: IP- Inpatient   Admission Date: 7/5/2023  Length of Stay: 1 days  Attending Physician: Cheko Bonner IV,   Primary Care Provider: Robert Gallegos MD        Subjective:     Principal Problem:Community acquired bacterial pneumonia        HPI:      Presented with c/o epigastric pain and generalized abd "soreness" along with n/v x5 and diarrhea x1 since last night. Denies blood or mucus in emesis or stool. Notes has had similar symptoms during previous bouts of diverticulitis. States is concerned that the not fully cooked steak that she ate yesterday caused this. Reports that she ate potato salad as well but no other people got sick from it. She denies chest pain or dyspnea. O2 sat upon arrival was 88 on RA. Pt reports that she has ended up with pneumonia in the past from vomiting. Denies known lung disease. PMH HTN, DM, GERD, CHF (but is no longer on meds for CHF). Previous appendectomy, cholecystectomy, hysterectomy.     O2 sat 88% upon arrival. Started on O2 at 3L/min up to 91%.      EKG ordered and reviewed. It shows sinus tach with rate of 119. No changes from previous on 8/20/22. Labs ordered and reviewed. WBC 35565 with neutrophil 89.9%, H&H 17 and 52, plt [id number], BUN 19, creatinine 1.54 (similar to previous labs), glucose 261, CO2 19, AST 25, ALT 25, lipase 94, amylase 99, K+ 4.6, Na 137. Chest xray shows bilat pneumonia. Lacitic acid 3.5, Mg 1.4, serum ketone negative, blood cultures x 2 pending. CT chest abd pelvis noncontrasted ordered and reviewed. It shows diffuse multifocal pneumonia of the right lung more so than the left.     No acute abnormality of the abdomen or pelvis     NS bolus 1 liter given. Ondanseron 4 mg IVP given. Rocephin 1 Gm IVPB and azithromycin 500 mg IVPB given in the ED.  Pt stable. O2 sat 92% on O2 at 3L/min per NC. /58. HR " improved 100. COVID 19 and flu are negative.  Discussed pt's case with Dr. Bonner. He accepted for admission for community acquired pneumonia.         Admitted to inpatient, spouse accompanying her on arrival. Talked with her code status and she chose Full code.       Overview/Hospital Course:  07/06/23 Awake and resting in bed. Chest is clear. Heart RRR. 02 sat is 93% on 3 liters. Denies cough. Denies any abdominal pain. States she is feeling somewhat better this morning. Has not vomited since before admission. White count is 19.24 today from 14.70 yesterday. Continue rocephin and Zithromax. Continue hydration.      Interval History: CAP    Review of Systems   Constitutional:  Positive for appetite change.   Respiratory:  Positive for cough. Negative for choking.         Reports occasional cough   Cardiovascular:  Negative for chest pain.   Gastrointestinal:  Negative for constipation, diarrhea, nausea and vomiting.   Objective:     Vital Signs (Most Recent):  Temp: 98.1 °F (36.7 °C) (07/06/23 1149)  Pulse: 90 (07/06/23 1149)  Resp: 20 (07/06/23 1149)  BP: (!) 116/57 (07/06/23 1149)  SpO2: (!) 93 % (07/06/23 1149) Vital Signs (24h Range):  Temp:  [97.9 °F (36.6 °C)-98.8 °F (37.1 °C)] 98.1 °F (36.7 °C)  Pulse:  [] 90  Resp:  [16-20] 20  SpO2:  [90 %-93 %] 93 %  BP: (108-133)/(56-65) 116/57     Weight: 78 kg (172 lb)  Body mass index is 31.46 kg/m².    Intake/Output Summary (Last 24 hours) at 7/6/2023 1235  Last data filed at 7/6/2023 0909  Gross per 24 hour   Intake 840 ml   Output --   Net 840 ml         Physical Exam  Constitutional:       Appearance: She is ill-appearing.   HENT:      Nose: Nose normal.   Cardiovascular:      Rate and Rhythm: Normal rate and regular rhythm.      Pulses: Normal pulses.      Heart sounds: Normal heart sounds.   Pulmonary:      Effort: Pulmonary effort is normal.      Breath sounds: Normal breath sounds.      Comments: 02 per nc at 3 liters   Abdominal:      General: Bowel  sounds are normal.      Palpations: Abdomen is soft.   Musculoskeletal:         General: Normal range of motion.   Skin:     General: Skin is warm and dry.   Neurological:      Mental Status: She is alert and oriented to person, place, and time.      Motor: Weakness present.   Psychiatric:         Mood and Affect: Mood normal.           Significant Labs: All pertinent labs within the past 24 hours have been reviewed.  Recent Lab Results  (Last 5 results in the past 24 hours)        07/06/23  1142   07/06/23  0723   07/06/23  0722   07/06/23  0634   07/05/23 2021        Anion Gap   10             Baso #     0.03           Basophil %     0.2           BUN   16             BUN/CREAT RATIO   12             Calcium   8.8             Chloride   105             CO2   30             Creatinine   1.38             Differential Type     Auto           eGFR   42             Eos #     0.16           Eosinophil %     0.8           Glucose   153             Hematocrit     38.9           Hemoglobin     12.5           Lymph #     2.56           Lymph %     13.3           MCH     29.1           MCHC     32.1           MCV     90.7           Mono #     1.13           Mono %     5.9           MPV     11.0           Neutrophils, Abs     15.36           Neutrophils Relative     79.8           Platelets     186           POC Glucose 191       133   202       Potassium   4.3             RBC     4.29           RDW     15.8           Sodium   141             WBC     19.24                                  Significant Imaging: I have reviewed all pertinent imaging results/findings within the past 24 hours.      Assessment/Plan:      * Community acquired bacterial pneumonia    07/06/23 continue 02 per nc at 3 liters   continue duonebs   continue rocephin and Zithromax  Blood cultures pending    Convulsions, unspecified convulsion type    07/05/23 continue valproic acid 250 mg po bid    Gastroesophageal reflux disease without  esophagitis    07/05/23 continue protonix 40 mg po daily    Acquired hypothyroidism    07/05/23 continue levothyroxine 50 mcg po daily      Essential hypertension  07/05/23 continue amlodipine 10 mg po daily        Recurrent major depressive disorder, in partial remission  07/05/23 continue trazodone 50 mg po daily        Neuropathy of both feet    07/05/23 gabapentin 100 mg po BID     Mixed hyperlipidemia  07/05/23 continue pravastatin 40 mg po at hs daily      Type 2 diabetes mellitus with diabetic neuropathy, without long-term current use of insulin    Lab Results   Component Value Date    HGBA1C 6.7 (H) 07/05/2023       Hold Oral hypoglycemics while patient is in the hospital. Home med is glipizide 5 mg po bid  07/05/23 SSI coverage    07/06 stable      VTE Risk Mitigation (From admission, onward)           Ordered     enoxaparin injection 40 mg  Daily         07/05/23 1110     IP VTE HIGH RISK PATIENT  Once         07/05/23 1110     Place ELLY hose  Until discontinued         07/05/23 1110                    Discharge Planning   ALBARO:      Code Status: Full Code   Is the patient medically ready for discharge?:     Reason for patient still in hospital (select all that apply): Treatment                     Cheko Bonner IV, DO  Department of Hospital Medicine   [facility]

## 2023-07-06 NOTE — HOSPITAL COURSE
"07/06/23 Awake and resting in bed. Chest is clear. Heart RRR. 02 sat is 93% on 3 liters. Denies cough. Denies any abdominal pain. States she is feeling somewhat better this morning. Has not vomited since before admission. White count is 19.24 today from 14.70 yesterday. Continue rocephin and Zithromax. Continue hydration.    07/07/23 States she is feeling better this morning. No nausea or vomiting reported/. Will increase to diabetic diet and cover with SSI. Reports occasional  nonproductive cough. Rhonchi to right lobes. White count down to 11,000. Afebrile. O2 sat on 3 liters is 93%. Will try to wean down and see how she does.      Went to check on pt. She was in restroom without o2. Assisted back to bed. O2 sat on room air is 88%. Silvia to 92% with o2 per nc at 3 liters.    7/8/23 Pt reports her mouth is getting sore and she thinks she has "yeast mouth." No visible erythema or rash/lesions. Ordering her Nystatin to use. Breath sounds show mild sandra rhonchi.  Cough sounds loose.  She remains on oxygen 2-3L via NC.  Lab stable. She is on day 3 of Rocephin/Azithromycin    07/09/23 Ms. Lopez is continuing to receive Rocephin and azithromycin. She is still requiring O2 at 2.5-3l/min per maintain sat > 92%. It has ranged 92-94% in the last 24 hours. RT reports that pt dropped down in the 80s on O2 at 2L/min per this am when trying to wean. She has been afebrile. Labs today show WBC 8550 with H&H 12.1 and 37.6, plt [id number], BUN 13, creatinine 1.28, Na 141, K+ 3.9. Her glucoses have ranged 171-233 in the last 24 hours. She is started on sliding scale insulin low dose today. Upon evaluation this morning, she c/o sinus congestion and headache. She says that she usually takes Allegra daily for her sinuses. She is started on Fluticasone nasal spray and cetirizine today.    07/10/23 Chest is clear. Sat drops to 88% on room air. States she has hx of sarcoidosis and sleep apnea but she has not seen in pulmonologist in years. " States she has an old cpap at home but she has not worn it in years because the mask caused her trigeminal neuralgia to worsen. Home o2 per nc at 2 liters ordered. Talked with her follow up with Dr. Robert Gallegos her PCP. Talked with her re need of follow up with pulmonology and that this can be arranged through her PCP. Medication reconciliation done.

## 2023-07-07 LAB
ANION GAP SERPL CALCULATED.3IONS-SCNC: 10 MMOL/L (ref 7–16)
BASOPHILS # BLD AUTO: 0.06 K/UL (ref 0–0.2)
BASOPHILS NFR BLD AUTO: 0.5 % (ref 0–1)
BUN SERPL-MCNC: 12 MG/DL (ref 7–18)
BUN/CREAT SERPL: 10 (ref 6–20)
CALCIUM SERPL-MCNC: 8.8 MG/DL (ref 8.5–10.1)
CHLORIDE SERPL-SCNC: 103 MMOL/L (ref 98–107)
CO2 SERPL-SCNC: 31 MMOL/L (ref 21–32)
CREAT SERPL-MCNC: 1.22 MG/DL (ref 0.55–1.02)
DIFFERENTIAL METHOD BLD: ABNORMAL
EGFR (NO RACE VARIABLE) (RUSH/TITUS): 48 ML/MIN/1.73M2
EOSINOPHIL # BLD AUTO: 0.28 K/UL (ref 0–0.5)
EOSINOPHIL NFR BLD AUTO: 2.4 % (ref 1–4)
ERYTHROCYTE [DISTWIDTH] IN BLOOD BY AUTOMATED COUNT: 15.7 % (ref 11.5–14.5)
GLUCOSE SERPL-MCNC: 138 MG/DL (ref 74–106)
GLUCOSE SERPL-MCNC: 144 MG/DL (ref 70–105)
GLUCOSE SERPL-MCNC: 180 MG/DL (ref 70–105)
GLUCOSE SERPL-MCNC: 182 MG/DL (ref 70–105)
GLUCOSE SERPL-MCNC: 228 MG/DL (ref 70–105)
HCT VFR BLD AUTO: 37.8 % (ref 38–47)
HGB BLD-MCNC: 12 G/DL (ref 12–16)
LYMPHOCYTES # BLD AUTO: 1.61 K/UL (ref 1–4.8)
LYMPHOCYTES NFR BLD AUTO: 13.7 % (ref 27–41)
MCH RBC QN AUTO: 29.4 PG (ref 27–31)
MCHC RBC AUTO-ENTMCNC: 31.7 G/DL (ref 32–36)
MCV RBC AUTO: 92.6 FL (ref 80–96)
MONOCYTES # BLD AUTO: 0.85 K/UL (ref 0–0.8)
MONOCYTES NFR BLD AUTO: 7.3 % (ref 2–6)
MPC BLD CALC-MCNC: 10.7 FL (ref 9.4–12.4)
NEUTROPHILS # BLD AUTO: 8.91 K/UL (ref 1.8–7.7)
NEUTROPHILS NFR BLD AUTO: 76.1 % (ref 53–65)
PLATELET # BLD AUTO: 184 K/UL (ref 150–400)
POTASSIUM SERPL-SCNC: 4.4 MMOL/L (ref 3.5–5.1)
RBC # BLD AUTO: 4.08 M/UL (ref 4.2–5.4)
SODIUM SERPL-SCNC: 140 MMOL/L (ref 136–145)
WBC # BLD AUTO: 11.71 K/UL (ref 4.5–11)

## 2023-07-07 PROCEDURE — 99232 PR SUBSEQUENT HOSPITAL CARE,LEVL II: ICD-10-PCS | Mod: ,,, | Performed by: NURSE PRACTITIONER

## 2023-07-07 PROCEDURE — 99900035 HC TECH TIME PER 15 MIN (STAT)

## 2023-07-07 PROCEDURE — 94640 AIRWAY INHALATION TREATMENT: CPT

## 2023-07-07 PROCEDURE — 25000003 PHARM REV CODE 250: Performed by: NURSE PRACTITIONER

## 2023-07-07 PROCEDURE — 25000242 PHARM REV CODE 250 ALT 637 W/ HCPCS: Performed by: NURSE PRACTITIONER

## 2023-07-07 PROCEDURE — 99232 SBSQ HOSP IP/OBS MODERATE 35: CPT | Mod: ,,, | Performed by: NURSE PRACTITIONER

## 2023-07-07 PROCEDURE — 25000003 PHARM REV CODE 250: Performed by: FAMILY MEDICINE

## 2023-07-07 PROCEDURE — 85025 COMPLETE CBC W/AUTO DIFF WBC: CPT | Performed by: NURSE PRACTITIONER

## 2023-07-07 PROCEDURE — 82962 GLUCOSE BLOOD TEST: CPT

## 2023-07-07 PROCEDURE — 80048 BASIC METABOLIC PNL TOTAL CA: CPT | Performed by: NURSE PRACTITIONER

## 2023-07-07 PROCEDURE — 27000221 HC OXYGEN, UP TO 24 HOURS

## 2023-07-07 PROCEDURE — 94761 N-INVAS EAR/PLS OXIMETRY MLT: CPT

## 2023-07-07 PROCEDURE — 63600175 PHARM REV CODE 636 W HCPCS: Performed by: NURSE PRACTITIONER

## 2023-07-07 PROCEDURE — 11000001 HC ACUTE MED/SURG PRIVATE ROOM

## 2023-07-07 RX ORDER — IBUPROFEN 200 MG
24 TABLET ORAL
Status: DISCONTINUED | OUTPATIENT
Start: 2023-07-07 | End: 2023-07-10 | Stop reason: HOSPADM

## 2023-07-07 RX ORDER — IBUPROFEN 200 MG
16 TABLET ORAL
Status: DISCONTINUED | OUTPATIENT
Start: 2023-07-07 | End: 2023-07-10 | Stop reason: HOSPADM

## 2023-07-07 RX ORDER — CALCIUM CARBONATE 200(500)MG
500 TABLET,CHEWABLE ORAL 3 TIMES DAILY PRN
Status: DISCONTINUED | OUTPATIENT
Start: 2023-07-07 | End: 2023-07-10 | Stop reason: HOSPADM

## 2023-07-07 RX ORDER — GLUCAGON 1 MG
1 KIT INJECTION
Status: DISCONTINUED | OUTPATIENT
Start: 2023-07-07 | End: 2023-07-10 | Stop reason: HOSPADM

## 2023-07-07 RX ADMIN — ESTRADIOL 1 MG: 0.5 TABLET ORAL at 10:07

## 2023-07-07 RX ADMIN — VALPROIC ACID 250 MG: 250 CAPSULE, LIQUID FILLED ORAL at 08:07

## 2023-07-07 RX ADMIN — CALCIUM CARBONATE (ANTACID) CHEW TAB 500 MG 500 MG: 500 CHEW TAB at 06:07

## 2023-07-07 RX ADMIN — PRAVASTATIN SODIUM 40 MG: 40 TABLET ORAL at 08:07

## 2023-07-07 RX ADMIN — IPRATROPIUM BROMIDE AND ALBUTEROL SULFATE 3 ML: 2.5; .5 SOLUTION RESPIRATORY (INHALATION) at 06:07

## 2023-07-07 RX ADMIN — CEFTRIAXONE 1 G: 1 INJECTION, POWDER, FOR SOLUTION INTRAMUSCULAR; INTRAVENOUS at 09:07

## 2023-07-07 RX ADMIN — IPRATROPIUM BROMIDE AND ALBUTEROL SULFATE 3 ML: 2.5; .5 SOLUTION RESPIRATORY (INHALATION) at 07:07

## 2023-07-07 RX ADMIN — GABAPENTIN 100 MG: 100 CAPSULE ORAL at 08:07

## 2023-07-07 RX ADMIN — SODIUM CHLORIDE: 9 INJECTION, SOLUTION INTRAVENOUS at 07:07

## 2023-07-07 RX ADMIN — IPRATROPIUM BROMIDE AND ALBUTEROL SULFATE 3 ML: 2.5; .5 SOLUTION RESPIRATORY (INHALATION) at 12:07

## 2023-07-07 RX ADMIN — LEVOTHYROXINE SODIUM 50 MCG: 0.05 TABLET ORAL at 06:07

## 2023-07-07 RX ADMIN — GABAPENTIN 100 MG: 100 CAPSULE ORAL at 10:07

## 2023-07-07 RX ADMIN — PANTOPRAZOLE SODIUM 40 MG: 40 TABLET, DELAYED RELEASE ORAL at 10:07

## 2023-07-07 RX ADMIN — TRAZODONE HYDROCHLORIDE 50 MG: 50 TABLET ORAL at 08:07

## 2023-07-07 RX ADMIN — CHOLECALCIFEROL TAB 25 MCG (1000 UNIT) 1000 UNITS: 25 TAB at 10:07

## 2023-07-07 RX ADMIN — AMLODIPINE BESYLATE 10 MG: 5 TABLET ORAL at 10:07

## 2023-07-07 RX ADMIN — ASPIRIN 81 MG: 81 TABLET, COATED ORAL at 10:07

## 2023-07-07 RX ADMIN — AZITHROMYCIN MONOHYDRATE 500 MG: 500 INJECTION, POWDER, LYOPHILIZED, FOR SOLUTION INTRAVENOUS at 11:07

## 2023-07-07 RX ADMIN — SODIUM CHLORIDE: 9 INJECTION, SOLUTION INTRAVENOUS at 08:07

## 2023-07-07 NOTE — SUBJECTIVE & OBJECTIVE
Interval History: 07/07/23 States she is feeling better this morning. No nausea or vomiting reported/. Will increase to diabetic diet and cover with SSI. Reports occasional  nonproductive cough. Rhonchi to right lobes. White count down to 11,000. Afebrile. O2 sat on 3 liters is 93%. Will try to wean down and see how she does.    Review of Systems   Respiratory:  Positive for cough. Negative for shortness of breath.    Cardiovascular:  Negative for chest pain.   Gastrointestinal:  Negative for constipation, diarrhea, nausea and vomiting.   Neurological:  Negative for weakness.   Objective:     Vital Signs (Most Recent):  Temp: 99.5 °F (37.5 °C) (07/07/23 0849)  Pulse: 100 (07/07/23 0849)  Resp: 20 (07/07/23 0849)  BP: 115/61 (07/07/23 0849)  SpO2: (!) 88 % (07/07/23 0849) Vital Signs (24h Range):  Temp:  [97.7 °F (36.5 °C)-99.7 °F (37.6 °C)] 99.5 °F (37.5 °C)  Pulse:  [] 100  Resp:  [16-20] 20  SpO2:  [88 %-93 %] 88 %  BP: (114-129)/(57-66) 115/61     Weight: 78 kg (172 lb)  Body mass index is 31.46 kg/m².    Intake/Output Summary (Last 24 hours) at 7/7/2023 1041  Last data filed at 7/6/2023 1811  Gross per 24 hour   Intake 480 ml   Output --   Net 480 ml         Physical Exam  HENT:      Mouth/Throat:      Mouth: Mucous membranes are moist.   Cardiovascular:      Rate and Rhythm: Normal rate and regular rhythm.      Pulses: Normal pulses.      Heart sounds: Normal heart sounds.   Pulmonary:      Breath sounds: Rhonchi present.      Comments: 02 per nc at 3 liters  Abdominal:      General: Bowel sounds are normal.      Palpations: Abdomen is soft.   Skin:     General: Skin is warm and dry.   Neurological:      Mental Status: She is oriented to person, place, and time.   Psychiatric:         Mood and Affect: Mood normal.           Significant Labs: All pertinent labs within the past 24 hours have been reviewed.  Recent Lab Results  (Last 5 results in the past 24 hours)        07/07/23  0608   07/07/23  0551    07/06/23  2107   07/06/23  1633   07/06/23  1407        Anion Gap   10             Baso #   0.06             Basophil %   0.5             BUN   12             BUN/CREAT RATIO   10             Calcium   8.8             Chloride   103             CO2   31             ID NOW COVID-19, (MISSY)         Negative       Creatinine   1.22             Differential Type   Auto             eGFR   48             Eos #   0.28             Eosinophil %   2.4             Glucose   138             Hematocrit   37.8             Hemoglobin   12.0             Lymph #   1.61             Lymph %   13.7             MCH   29.4             MCHC   31.7             MCV   92.6             Mono #   0.85             Mono %   7.3             MPV   10.7             Neutrophils, Abs   8.91             Neutrophils Relative   76.1             Platelets   184             POC Glucose 144     118   140         Potassium   4.4             RBC   4.08             RDW   15.7             Sodium   140             WBC   11.71                                    Significant Imaging: I have reviewed all pertinent imaging results/findings within the past 24 hours.

## 2023-07-07 NOTE — PLAN OF CARE
Problem: Adult Inpatient Plan of Care  Goal: Patient-Specific Goal (Individualized)  Outcome: Ongoing, Progressing     Problem: Diabetes Comorbidity  Goal: Blood Glucose Level Within Targeted Range  Outcome: Ongoing, Progressing  Intervention: Monitor and Manage Glycemia  Flowsheets (Taken 7/7/2023 0137)  Glycemic Management: blood glucose monitored     Problem: Respiratory Compromise (Pneumonia)  Goal: Effective Oxygenation and Ventilation  Outcome: Ongoing, Progressing  Intervention: Promote Airway Secretion Clearance  Flowsheets (Taken 7/7/2023 0137)  Breathing Techniques/Airway Clearance: deep/controlled cough encouraged  Cough And Deep Breathing: done independently per patient  Intervention: Optimize Oxygenation and Ventilation  Flowsheets (Taken 7/7/2023 0137)  Airway/Ventilation Management: airway patency maintained  Head of Bed (HOB) Positioning: HOB elevated

## 2023-07-07 NOTE — PROGRESS NOTES
"[facility]  Hospital Medicine  Progress Note    Patient Name: Rosalva Lopez  MRN: [id number]  Patient Class: IP- Inpatient   Admission Date: 7/5/2023  Length of Stay: 2 days  Attending Physician: Cheko Bonner IV, DO  Primary Care Provider: Robert Gallegos MD        Subjective:     Principal Problem:Community acquired bacterial pneumonia        HPI:      Presented with c/o epigastric pain and generalized abd "soreness" along with n/v x5 and diarrhea x1 since last night. Denies blood or mucus in emesis or stool. Notes has had similar symptoms during previous bouts of diverticulitis. States is concerned that the not fully cooked steak that she ate yesterday caused this. Reports that she ate potato salad as well but no other people got sick from it. She denies chest pain or dyspnea. O2 sat upon arrival was 88 on RA. Pt reports that she has ended up with pneumonia in the past from vomiting. Denies known lung disease. PMH HTN, DM, GERD, CHF (but is no longer on meds for CHF). Previous appendectomy, cholecystectomy, hysterectomy.     O2 sat 88% upon arrival. Started on O2 at 3L/min up to 91%.      EKG ordered and reviewed. It shows sinus tach with rate of 119. No changes from previous on 8/20/22. Labs ordered and reviewed. WBC 47467 with neutrophil 89.9%, H&H 17 and 52, plt [id number], BUN 19, creatinine 1.54 (similar to previous labs), glucose 261, CO2 19, AST 25, ALT 25, lipase 94, amylase 99, K+ 4.6, Na 137. Chest xray shows bilat pneumonia. Lacitic acid 3.5, Mg 1.4, serum ketone negative, blood cultures x 2 pending. CT chest abd pelvis noncontrasted ordered and reviewed. It shows diffuse multifocal pneumonia of the right lung more so than the left.     No acute abnormality of the abdomen or pelvis     NS bolus 1 liter given. Ondanseron 4 mg IVP given. Rocephin 1 Gm IVPB and azithromycin 500 mg IVPB given in the ED.  Pt stable. O2 sat 92% on O2 at 3L/min per NC. /58. HR " improved 100. COVID 19 and flu are negative.  Discussed pt's case with Dr. oBnner. He accepted for admission for community acquired pneumonia.         Admitted to inpatient, spouse accompanying her on arrival. Talked with her code status and she chose Full code.       Overview/Hospital Course:  07/06/23 Awake and resting in bed. Chest is clear. Heart RRR. 02 sat is 93% on 3 liters. Denies cough. Denies any abdominal pain. States she is feeling somewhat better this morning. Has not vomited since before admission. White count is 19.24 today from 14.70 yesterday. Continue rocephin and Zithromax. Continue hydration.    07/07/23 States she is feeling better this morning. No nausea or vomiting reported/. Will increase to diabetic diet and cover with SSI. Reports occasional  nonproductive cough. Rhonchi to right lobes. White count down to 11,000. Afebrile. O2 sat on 3 liters is 93%. Will try to wean down and see how she does.      Interval History: 07/07/23 States she is feeling better this morning. No nausea or vomiting reported/. Will increase to diabetic diet and cover with SSI. Reports occasional  nonproductive cough. Rhonchi to right lobes. White count down to 11,000. Afebrile. O2 sat on 3 liters is 93%. Will try to wean down and see how she does.    Review of Systems   Respiratory:  Positive for cough. Negative for shortness of breath.    Cardiovascular:  Negative for chest pain.   Gastrointestinal:  Negative for constipation, diarrhea, nausea and vomiting.   Neurological:  Negative for weakness.   Objective:     Vital Signs (Most Recent):  Temp: 99.5 °F (37.5 °C) (07/07/23 0849)  Pulse: 100 (07/07/23 0849)  Resp: 20 (07/07/23 0849)  BP: 115/61 (07/07/23 0849)  SpO2: (!) 88 % (07/07/23 0849) Vital Signs (24h Range):  Temp:  [97.7 °F (36.5 °C)-99.7 °F (37.6 °C)] 99.5 °F (37.5 °C)  Pulse:  [] 100  Resp:  [16-20] 20  SpO2:  [88 %-93 %] 88 %  BP: (114-129)/(57-66) 115/61     Weight: 78 kg (172 lb)  Body mass index  is 31.46 kg/m².    Intake/Output Summary (Last 24 hours) at 7/7/2023 1041  Last data filed at 7/6/2023 1811  Gross per 24 hour   Intake 480 ml   Output --   Net 480 ml         Physical Exam  HENT:      Mouth/Throat:      Mouth: Mucous membranes are moist.   Cardiovascular:      Rate and Rhythm: Normal rate and regular rhythm.      Pulses: Normal pulses.      Heart sounds: Normal heart sounds.   Pulmonary:      Breath sounds: Rhonchi present.      Comments: 02 per nc at 3 liters  Abdominal:      General: Bowel sounds are normal.      Palpations: Abdomen is soft.   Skin:     General: Skin is warm and dry.   Neurological:      Mental Status: She is oriented to person, place, and time.   Psychiatric:         Mood and Affect: Mood normal.           Significant Labs: All pertinent labs within the past 24 hours have been reviewed.  Recent Lab Results  (Last 5 results in the past 24 hours)        07/07/23  0608   07/07/23  0551   07/06/23  2107   07/06/23  1633   07/06/23  1407        Anion Gap   10             Baso #   0.06             Basophil %   0.5             BUN   12             BUN/CREAT RATIO   10             Calcium   8.8             Chloride   103             CO2   31             ID NOW COVID-19, (MISSY)         Negative       Creatinine   1.22             Differential Type   Auto             eGFR   48             Eos #   0.28             Eosinophil %   2.4             Glucose   138             Hematocrit   37.8             Hemoglobin   12.0             Lymph #   1.61             Lymph %   13.7             MCH   29.4             MCHC   31.7             MCV   92.6             Mono #   0.85             Mono %   7.3             MPV   10.7             Neutrophils, Abs   8.91             Neutrophils Relative   76.1             Platelets   184             POC Glucose 144     118   140         Potassium   4.4             RBC   4.08             RDW   15.7             Sodium   140             WBC   11.71                                     Significant Imaging: I have reviewed all pertinent imaging results/findings within the past 24 hours.      Assessment/Plan:      * Community acquired bacterial pneumonia    07/06/23 continue 02 per nc at 3 liters   continue duonebs   continue rocephin and Zithromax  Blood cultures pending    07/07/23 WBC down 11,000 , afebrile  Try to wean o2 down as tolerated   Continue rocephin and zithromax  Blood cultures show no growth     Convulsions, unspecified convulsion type    07/05/23 continue valproic acid 250 mg po bid    Gastroesophageal reflux disease without esophagitis    07/05/23 continue protonix 40 mg po daily    Acquired hypothyroidism    07/05/23 continue levothyroxine 50 mcg po daily      Essential hypertension  07/05/23 continue amlodipine 10 mg po daily        Recurrent major depressive disorder, in partial remission  07/05/23 continue trazodone 50 mg po daily        Neuropathy of both feet    07/05/23 gabapentin 100 mg po BID     Mixed hyperlipidemia  07/05/23 continue pravastatin 40 mg po at hs daily      Type 2 diabetes mellitus with diabetic neuropathy, without long-term current use of insulin    Lab Results   Component Value Date    HGBA1C 6.7 (H) 07/05/2023       Hold Oral hypoglycemics while patient is in the hospital. Home med is glipizide 5 mg po bid  07/05/23 SSI coverage    07/06 stable    07/07/23 start diabetic diet and cover with SSI      VTE Risk Mitigation (From admission, onward)         Ordered     enoxaparin injection 40 mg  Daily         07/05/23 1110     IP VTE HIGH RISK PATIENT  Once         07/05/23 1110     Place ELLY hose  Until discontinued         07/05/23 1110                Discharge Planning   ALBARO:      Code Status: Full Code   Is the patient medically ready for discharge?:     Reason for patient still in hospital (select all that apply): Treatment  Discharge Plan A: Home, Home with family                  DEBORAH Newsome  Department of Hospital Medicine    [facility]

## 2023-07-08 LAB
ANION GAP SERPL CALCULATED.3IONS-SCNC: 13 MMOL/L (ref 7–16)
BASOPHILS # BLD AUTO: 0.05 K/UL (ref 0–0.2)
BASOPHILS NFR BLD AUTO: 0.5 % (ref 0–1)
BUN SERPL-MCNC: 14 MG/DL (ref 7–18)
BUN/CREAT SERPL: 11 (ref 6–20)
CALCIUM SERPL-MCNC: 8.9 MG/DL (ref 8.5–10.1)
CHLORIDE SERPL-SCNC: 104 MMOL/L (ref 98–107)
CO2 SERPL-SCNC: 28 MMOL/L (ref 21–32)
CREAT SERPL-MCNC: 1.22 MG/DL (ref 0.55–1.02)
DIFFERENTIAL METHOD BLD: ABNORMAL
EGFR (NO RACE VARIABLE) (RUSH/TITUS): 48 ML/MIN/1.73M2
EOSINOPHIL # BLD AUTO: 0.35 K/UL (ref 0–0.5)
EOSINOPHIL NFR BLD AUTO: 3.4 % (ref 1–4)
ERYTHROCYTE [DISTWIDTH] IN BLOOD BY AUTOMATED COUNT: 15.5 % (ref 11.5–14.5)
GLUCOSE SERPL-MCNC: 171 MG/DL (ref 70–105)
GLUCOSE SERPL-MCNC: 185 MG/DL (ref 74–106)
GLUCOSE SERPL-MCNC: 194 MG/DL (ref 70–105)
GLUCOSE SERPL-MCNC: 217 MG/DL (ref 70–105)
GLUCOSE SERPL-MCNC: 233 MG/DL (ref 70–105)
HCT VFR BLD AUTO: 38.3 % (ref 38–47)
HGB BLD-MCNC: 12.2 G/DL (ref 12–16)
LYMPHOCYTES # BLD AUTO: 1.42 K/UL (ref 1–4.8)
LYMPHOCYTES NFR BLD AUTO: 13.7 % (ref 27–41)
MCH RBC QN AUTO: 28.8 PG (ref 27–31)
MCHC RBC AUTO-ENTMCNC: 31.9 G/DL (ref 32–36)
MCV RBC AUTO: 90.3 FL (ref 80–96)
MONOCYTES # BLD AUTO: 0.67 K/UL (ref 0–0.8)
MONOCYTES NFR BLD AUTO: 6.5 % (ref 2–6)
MPC BLD CALC-MCNC: 11.2 FL (ref 9.4–12.4)
NEUTROPHILS # BLD AUTO: 7.88 K/UL (ref 1.8–7.7)
NEUTROPHILS NFR BLD AUTO: 75.9 % (ref 53–65)
PLATELET # BLD AUTO: 211 K/UL (ref 150–400)
POTASSIUM SERPL-SCNC: 3.9 MMOL/L (ref 3.5–5.1)
RBC # BLD AUTO: 4.24 M/UL (ref 4.2–5.4)
SODIUM SERPL-SCNC: 141 MMOL/L (ref 136–145)
WBC # BLD AUTO: 10.37 K/UL (ref 4.5–11)

## 2023-07-08 PROCEDURE — 27000221 HC OXYGEN, UP TO 24 HOURS

## 2023-07-08 PROCEDURE — 80048 BASIC METABOLIC PNL TOTAL CA: CPT | Performed by: NURSE PRACTITIONER

## 2023-07-08 PROCEDURE — 85025 COMPLETE CBC W/AUTO DIFF WBC: CPT | Performed by: NURSE PRACTITIONER

## 2023-07-08 PROCEDURE — 25000003 PHARM REV CODE 250: Performed by: NURSE PRACTITIONER

## 2023-07-08 PROCEDURE — 94761 N-INVAS EAR/PLS OXIMETRY MLT: CPT

## 2023-07-08 PROCEDURE — 11000001 HC ACUTE MED/SURG PRIVATE ROOM

## 2023-07-08 PROCEDURE — 82962 GLUCOSE BLOOD TEST: CPT

## 2023-07-08 PROCEDURE — 25000242 PHARM REV CODE 250 ALT 637 W/ HCPCS: Performed by: NURSE PRACTITIONER

## 2023-07-08 PROCEDURE — 25000003 PHARM REV CODE 250: Performed by: FAMILY MEDICINE

## 2023-07-08 PROCEDURE — 99900035 HC TECH TIME PER 15 MIN (STAT)

## 2023-07-08 PROCEDURE — 63600175 PHARM REV CODE 636 W HCPCS: Performed by: NURSE PRACTITIONER

## 2023-07-08 PROCEDURE — 94640 AIRWAY INHALATION TREATMENT: CPT

## 2023-07-08 RX ORDER — NYSTATIN 100000 [USP'U]/ML
500000 SUSPENSION ORAL
Status: DISCONTINUED | OUTPATIENT
Start: 2023-07-08 | End: 2023-07-10 | Stop reason: HOSPADM

## 2023-07-08 RX ADMIN — GABAPENTIN 100 MG: 100 CAPSULE ORAL at 08:07

## 2023-07-08 RX ADMIN — AMLODIPINE BESYLATE 10 MG: 5 TABLET ORAL at 10:07

## 2023-07-08 RX ADMIN — SODIUM CHLORIDE: 9 INJECTION, SOLUTION INTRAVENOUS at 06:07

## 2023-07-08 RX ADMIN — PRAVASTATIN SODIUM 40 MG: 40 TABLET ORAL at 08:07

## 2023-07-08 RX ADMIN — ASPIRIN 81 MG: 81 TABLET, COATED ORAL at 10:07

## 2023-07-08 RX ADMIN — IPRATROPIUM BROMIDE AND ALBUTEROL SULFATE 3 ML: 2.5; .5 SOLUTION RESPIRATORY (INHALATION) at 12:07

## 2023-07-08 RX ADMIN — NYSTATIN 500000 UNITS: 100000 SUSPENSION ORAL at 08:07

## 2023-07-08 RX ADMIN — IPRATROPIUM BROMIDE AND ALBUTEROL SULFATE 3 ML: 2.5; .5 SOLUTION RESPIRATORY (INHALATION) at 02:07

## 2023-07-08 RX ADMIN — CHOLECALCIFEROL TAB 25 MCG (1000 UNIT) 1000 UNITS: 25 TAB at 10:07

## 2023-07-08 RX ADMIN — SODIUM CHLORIDE: 9 INJECTION, SOLUTION INTRAVENOUS at 07:07

## 2023-07-08 RX ADMIN — IPRATROPIUM BROMIDE AND ALBUTEROL SULFATE 3 ML: 2.5; .5 SOLUTION RESPIRATORY (INHALATION) at 07:07

## 2023-07-08 RX ADMIN — GABAPENTIN 100 MG: 100 CAPSULE ORAL at 10:07

## 2023-07-08 RX ADMIN — PANTOPRAZOLE SODIUM 40 MG: 40 TABLET, DELAYED RELEASE ORAL at 10:07

## 2023-07-08 RX ADMIN — ESTRADIOL 1 MG: 0.5 TABLET ORAL at 10:07

## 2023-07-08 RX ADMIN — LEVOTHYROXINE SODIUM 50 MCG: 0.05 TABLET ORAL at 06:07

## 2023-07-08 RX ADMIN — VALPROIC ACID 250 MG: 250 CAPSULE, LIQUID FILLED ORAL at 08:07

## 2023-07-08 RX ADMIN — AZITHROMYCIN MONOHYDRATE 500 MG: 500 INJECTION, POWDER, LYOPHILIZED, FOR SOLUTION INTRAVENOUS at 12:07

## 2023-07-08 RX ADMIN — NYSTATIN 500000 UNITS: 100000 SUSPENSION ORAL at 04:07

## 2023-07-08 RX ADMIN — NYSTATIN 500000 UNITS: 100000 SUSPENSION ORAL at 11:07

## 2023-07-08 RX ADMIN — TRAZODONE HYDROCHLORIDE 50 MG: 50 TABLET ORAL at 08:07

## 2023-07-08 RX ADMIN — CEFTRIAXONE 1 G: 1 INJECTION, POWDER, FOR SOLUTION INTRAMUSCULAR; INTRAVENOUS at 10:07

## 2023-07-08 NOTE — PLAN OF CARE
Problem: Adult Inpatient Plan of Care  Goal: Patient-Specific Goal (Individualized)  Outcome: Ongoing, Progressing     Problem: Diabetes Comorbidity  Goal: Blood Glucose Level Within Targeted Range  Outcome: Ongoing, Progressing     Problem: Infection (Pneumonia)  Goal: Resolution of Infection Signs and Symptoms  Outcome: Ongoing, Progressing  Intervention: Prevent Infection Progression  Flowsheets (Taken 7/8/2023 0128)  Fever Reduction/Comfort Measures: fluid intake increased     Problem: Fall Injury Risk  Goal: Absence of Fall and Fall-Related Injury  Outcome: Ongoing, Progressing  Intervention: Promote Injury-Free Environment  Flowsheets (Taken 7/8/2023 0128)  Safety Promotion/Fall Prevention:   assistive device/personal item within reach   instructed to call staff for mobility   side rails raised x 2

## 2023-07-08 NOTE — SUBJECTIVE & OBJECTIVE
"Interval History: Pt reports her mouth is getting sore and she thinks she has "yeast mouth." No visible erythema or rash/lesions. Ordering her Nystatin to use. Breath sounds show mild sandra rhonchi.  Cough sounds loose.  She remains on oxygen 2-3L via NC.  Lab stable. She is on day 3 of Rocephin/Azithromycin    Review of Systems   Constitutional:  Negative for chills, diaphoresis and fever.   HENT:  Negative for mouth sores.    Respiratory:  Positive for cough and shortness of breath.    Cardiovascular:  Negative for chest pain.   Objective:     Vital Signs (Most Recent):  Temp: 98.1 °F (36.7 °C) (07/08/23 0729)  Pulse: 80 (07/08/23 0729)  Resp: 18 (07/08/23 0729)  BP: 138/60 (07/08/23 0729)  SpO2: 95 % (07/08/23 0930) Vital Signs (24h Range):  Temp:  [98 °F (36.7 °C)-99 °F (37.2 °C)] 98.1 °F (36.7 °C)  Pulse:  [66-92] 80  Resp:  [18-20] 18  SpO2:  [87 %-95 %] 95 %  BP: (117-140)/(60-74) 138/60     Weight: 78 kg (172 lb)  Body mass index is 31.46 kg/m².    Intake/Output Summary (Last 24 hours) at 7/8/2023 1026  Last data filed at 7/8/2023 0849  Gross per 24 hour   Intake 1000 ml   Output 56 ml   Net 944 ml         Physical Exam  Constitutional:       General: She is awake. She is not in acute distress.     Appearance: Normal appearance. She is obese. She is ill-appearing. She is not toxic-appearing or diaphoretic.   HENT:      Head: Normocephalic and atraumatic.      Mouth/Throat:      Lips: Pink. No lesions.      Mouth: Mucous membranes are moist.      Tongue: No lesions.      Palate: No lesions.      Pharynx: Oropharynx is clear. Uvula midline. No pharyngeal swelling, oropharyngeal exudate, posterior oropharyngeal erythema or uvula swelling.   Cardiovascular:      Rate and Rhythm: Normal rate and regular rhythm.      Pulses: Normal pulses.      Heart sounds: Normal heart sounds. No murmur heard.  Pulmonary:      Effort: Pulmonary effort is normal. No respiratory distress.      Breath sounds: Normal air entry. No " stridor, decreased air movement or transmitted upper airway sounds. Examination of the right-middle field reveals rhonchi. Examination of the left-middle field reveals rhonchi. Rhonchi present. No decreased breath sounds, wheezing or rales.   Musculoskeletal:      Cervical back: Normal range of motion. No rigidity.   Skin:     General: Skin is warm and dry.      Capillary Refill: Capillary refill takes less than 2 seconds.   Neurological:      General: No focal deficit present.      Mental Status: She is alert and oriented to person, place, and time.   Psychiatric:         Mood and Affect: Mood normal.         Behavior: Behavior is cooperative.           Significant Labs: All pertinent labs within the past 24 hours have been reviewed.    Significant Imaging: I have reviewed all pertinent imaging results/findings within the past 24 hours.

## 2023-07-08 NOTE — PROGRESS NOTES
"[facility]  Hospital Medicine  Progress Note    Patient Name: Rosalva Lopez  MRN: [id number]  Patient Class: IP- Inpatient   Admission Date: 7/5/2023  Length of Stay: 3 days  Attending Physician: Cheko Bonner IV, DO  Primary Care Provider: Robert Gallegos MD        Subjective:     Principal Problem:Community acquired bacterial pneumonia        HPI:      Presented with c/o epigastric pain and generalized abd "soreness" along with n/v x5 and diarrhea x1 since last night. Denies blood or mucus in emesis or stool. Notes has had similar symptoms during previous bouts of diverticulitis. States is concerned that the not fully cooked steak that she ate yesterday caused this. Reports that she ate potato salad as well but no other people got sick from it. She denies chest pain or dyspnea. O2 sat upon arrival was 88 on RA. Pt reports that she has ended up with pneumonia in the past from vomiting. Denies known lung disease. PMH HTN, DM, GERD, CHF (but is no longer on meds for CHF). Previous appendectomy, cholecystectomy, hysterectomy.     O2 sat 88% upon arrival. Started on O2 at 3L/min up to 91%.      EKG ordered and reviewed. It shows sinus tach with rate of 119. No changes from previous on 8/20/22. Labs ordered and reviewed. WBC 72051 with neutrophil 89.9%, H&H 17 and 52, plt [id number], BUN 19, creatinine 1.54 (similar to previous labs), glucose 261, CO2 19, AST 25, ALT 25, lipase 94, amylase 99, K+ 4.6, Na 137. Chest xray shows bilat pneumonia. Lacitic acid 3.5, Mg 1.4, serum ketone negative, blood cultures x 2 pending. CT chest abd pelvis noncontrasted ordered and reviewed. It shows diffuse multifocal pneumonia of the right lung more so than the left.     No acute abnormality of the abdomen or pelvis     NS bolus 1 liter given. Ondanseron 4 mg IVP given. Rocephin 1 Gm IVPB and azithromycin 500 mg IVPB given in the ED.  Pt stable. O2 sat 92% on O2 at 3L/min per NC. /58. HR " "improved 100. COVID 19 and flu are negative.  Discussed pt's case with Dr. Bonner. He accepted for admission for community acquired pneumonia.         Admitted to inpatient, spouse accompanying her on arrival. Talked with her code status and she chose Full code.       Overview/Hospital Course:  07/06/23 Awake and resting in bed. Chest is clear. Heart RRR. 02 sat is 93% on 3 liters. Denies cough. Denies any abdominal pain. States she is feeling somewhat better this morning. Has not vomited since before admission. White count is 19.24 today from 14.70 yesterday. Continue rocephin and Zithromax. Continue hydration.    07/07/23 States she is feeling better this morning. No nausea or vomiting reported/. Will increase to diabetic diet and cover with SSI. Reports occasional  nonproductive cough. Rhonchi to right lobes. White count down to 11,000. Afebrile. O2 sat on 3 liters is 93%. Will try to wean down and see how she does.      Went to check on pt. She was in restroom without o2. Assisted back to bed. O2 sat on room air is 88%. Silvia to 92% with o2 per nc at 3 liters.    7/8/23 Pt reports her mouth is getting sore and she thinks she has "yeast mouth." No visible erythema or rash/lesions. Ordering her Nystatin to use. Breath sounds show mild sandra rhonchi.  Cough sounds loose.  She remains on oxygen 2-3L via NC.  Lab stable. She is on day 3 of Rocephin/Azithromycin      Interval History: Pt reports her mouth is getting sore and she thinks she has "yeast mouth." No visible erythema or rash/lesions. Ordering her Nystatin to use. Breath sounds show mild sandra rhonchi.  Cough sounds loose.  She remains on oxygen 2-3L via NC.  Lab stable. She is on day 3 of Rocephin/Azithromycin    Review of Systems   Constitutional:  Negative for chills, diaphoresis and fever.   HENT:  Negative for mouth sores.    Respiratory:  Positive for cough and shortness of breath.    Cardiovascular:  Negative for chest pain.   Objective:     Vital Signs " (Most Recent):  Temp: 98.1 °F (36.7 °C) (07/08/23 0729)  Pulse: 80 (07/08/23 0729)  Resp: 18 (07/08/23 0729)  BP: 138/60 (07/08/23 0729)  SpO2: 95 % (07/08/23 0930) Vital Signs (24h Range):  Temp:  [98 °F (36.7 °C)-99 °F (37.2 °C)] 98.1 °F (36.7 °C)  Pulse:  [66-92] 80  Resp:  [18-20] 18  SpO2:  [87 %-95 %] 95 %  BP: (117-140)/(60-74) 138/60     Weight: 78 kg (172 lb)  Body mass index is 31.46 kg/m².    Intake/Output Summary (Last 24 hours) at 7/8/2023 1026  Last data filed at 7/8/2023 0849  Gross per 24 hour   Intake 1000 ml   Output 56 ml   Net 944 ml         Physical Exam  Constitutional:       General: She is awake. She is not in acute distress.     Appearance: Normal appearance. She is obese. She is ill-appearing. She is not toxic-appearing or diaphoretic.   HENT:      Head: Normocephalic and atraumatic.      Mouth/Throat:      Lips: Pink. No lesions.      Mouth: Mucous membranes are moist.      Tongue: No lesions.      Palate: No lesions.      Pharynx: Oropharynx is clear. Uvula midline. No pharyngeal swelling, oropharyngeal exudate, posterior oropharyngeal erythema or uvula swelling.   Cardiovascular:      Rate and Rhythm: Normal rate and regular rhythm.      Pulses: Normal pulses.      Heart sounds: Normal heart sounds. No murmur heard.  Pulmonary:      Effort: Pulmonary effort is normal. No respiratory distress.      Breath sounds: Normal air entry. No stridor, decreased air movement or transmitted upper airway sounds. Examination of the right-middle field reveals rhonchi. Examination of the left-middle field reveals rhonchi. Rhonchi present. No decreased breath sounds, wheezing or rales.   Musculoskeletal:      Cervical back: Normal range of motion. No rigidity.   Skin:     General: Skin is warm and dry.      Capillary Refill: Capillary refill takes less than 2 seconds.   Neurological:      General: No focal deficit present.      Mental Status: She is alert and oriented to person, place, and time.    Psychiatric:         Mood and Affect: Mood normal.         Behavior: Behavior is cooperative.           Significant Labs: All pertinent labs within the past 24 hours have been reviewed.    Significant Imaging: I have reviewed all pertinent imaging results/findings within the past 24 hours.      Assessment/Plan:      * Community acquired bacterial pneumonia    07/06/23 continue 02 per nc at 3 liters   continue duonebs   continue rocephin and Zithromax  Blood cultures pending    07/07/23 WBC down 11,000 , afebrile  Try to wean o2 down as tolerated   Continue rocephin and zithromax  Blood cultures show no growth     Convulsions, unspecified convulsion type    07/05/23 continue valproic acid 250 mg po bid    Gastroesophageal reflux disease without esophagitis    07/05/23 continue protonix 40 mg po daily    Acquired hypothyroidism    07/05/23 continue levothyroxine 50 mcg po daily      Essential hypertension  07/05/23 continue amlodipine 10 mg po daily        Recurrent major depressive disorder, in partial remission  07/05/23 continue trazodone 50 mg po daily        Neuropathy of both feet    07/05/23 gabapentin 100 mg po BID     Mixed hyperlipidemia  07/05/23 continue pravastatin 40 mg po at hs daily      Type 2 diabetes mellitus with diabetic neuropathy, without long-term current use of insulin    Lab Results   Component Value Date    HGBA1C 6.7 (H) 07/05/2023       Hold Oral hypoglycemics while patient is in the hospital. Home med is glipizide 5 mg po bid  07/05/23 SSI coverage    07/06 stable    07/07/23 start diabetic diet and cover with SSI    VTE Risk Mitigation (From admission, onward)         Ordered     enoxaparin injection 40 mg  Daily         07/05/23 1110     IP VTE HIGH RISK PATIENT  Once         07/05/23 1110     Place ELLY hose  Until discontinued         07/05/23 1110                Discharge Planning   ALBARO:      Code Status: Full Code   Is the patient medically ready for discharge?:     Reason for  patient still in hospital (select all that apply): Patient trending condition and Treatment  Discharge Plan A: Home, Home with family                  DEBORAH Galvez  Department of Hospital Medicine   [facility]

## 2023-07-09 LAB
ANION GAP SERPL CALCULATED.3IONS-SCNC: 13 MMOL/L (ref 7–16)
BASOPHILS # BLD AUTO: 0.06 K/UL (ref 0–0.2)
BASOPHILS NFR BLD AUTO: 0.7 % (ref 0–1)
BUN SERPL-MCNC: 13 MG/DL (ref 7–18)
BUN/CREAT SERPL: 10 (ref 6–20)
CALCIUM SERPL-MCNC: 8.9 MG/DL (ref 8.5–10.1)
CHLORIDE SERPL-SCNC: 104 MMOL/L (ref 98–107)
CO2 SERPL-SCNC: 28 MMOL/L (ref 21–32)
CREAT SERPL-MCNC: 1.28 MG/DL (ref 0.55–1.02)
DIFFERENTIAL METHOD BLD: ABNORMAL
EGFR (NO RACE VARIABLE) (RUSH/TITUS): 45 ML/MIN/1.73M2
EOSINOPHIL # BLD AUTO: 0.42 K/UL (ref 0–0.5)
EOSINOPHIL NFR BLD AUTO: 4.9 % (ref 1–4)
ERYTHROCYTE [DISTWIDTH] IN BLOOD BY AUTOMATED COUNT: 15.4 % (ref 11.5–14.5)
GLUCOSE SERPL-MCNC: 179 MG/DL (ref 70–105)
GLUCOSE SERPL-MCNC: 203 MG/DL (ref 70–105)
GLUCOSE SERPL-MCNC: 223 MG/DL (ref 74–106)
GLUCOSE SERPL-MCNC: 247 MG/DL (ref 70–105)
GLUCOSE SERPL-MCNC: 270 MG/DL (ref 70–105)
HCT VFR BLD AUTO: 37.6 % (ref 38–47)
HGB BLD-MCNC: 12.1 G/DL (ref 12–16)
LYMPHOCYTES # BLD AUTO: 1.82 K/UL (ref 1–4.8)
LYMPHOCYTES NFR BLD AUTO: 21.3 % (ref 27–41)
MCH RBC QN AUTO: 28.9 PG (ref 27–31)
MCHC RBC AUTO-ENTMCNC: 32.2 G/DL (ref 32–36)
MCV RBC AUTO: 90 FL (ref 80–96)
MONOCYTES # BLD AUTO: 0.72 K/UL (ref 0–0.8)
MONOCYTES NFR BLD AUTO: 8.4 % (ref 2–6)
MPC BLD CALC-MCNC: 10.8 FL (ref 9.4–12.4)
NEUTROPHILS # BLD AUTO: 5.53 K/UL (ref 1.8–7.7)
NEUTROPHILS NFR BLD AUTO: 64.7 % (ref 53–65)
PLATELET # BLD AUTO: 237 K/UL (ref 150–400)
POTASSIUM SERPL-SCNC: 3.9 MMOL/L (ref 3.5–5.1)
RBC # BLD AUTO: 4.18 M/UL (ref 4.2–5.4)
SODIUM SERPL-SCNC: 141 MMOL/L (ref 136–145)
WBC # BLD AUTO: 8.55 K/UL (ref 4.5–11)

## 2023-07-09 PROCEDURE — 25000003 PHARM REV CODE 250: Performed by: NURSE PRACTITIONER

## 2023-07-09 PROCEDURE — 82962 GLUCOSE BLOOD TEST: CPT

## 2023-07-09 PROCEDURE — 80048 BASIC METABOLIC PNL TOTAL CA: CPT | Performed by: NURSE PRACTITIONER

## 2023-07-09 PROCEDURE — 27000221 HC OXYGEN, UP TO 24 HOURS

## 2023-07-09 PROCEDURE — 99900035 HC TECH TIME PER 15 MIN (STAT)

## 2023-07-09 PROCEDURE — 25000003 PHARM REV CODE 250: Performed by: FAMILY MEDICINE

## 2023-07-09 PROCEDURE — 94761 N-INVAS EAR/PLS OXIMETRY MLT: CPT

## 2023-07-09 PROCEDURE — 11000001 HC ACUTE MED/SURG PRIVATE ROOM

## 2023-07-09 PROCEDURE — 85025 COMPLETE CBC W/AUTO DIFF WBC: CPT | Performed by: NURSE PRACTITIONER

## 2023-07-09 PROCEDURE — 94640 AIRWAY INHALATION TREATMENT: CPT

## 2023-07-09 PROCEDURE — 63600175 PHARM REV CODE 636 W HCPCS: Performed by: NURSE PRACTITIONER

## 2023-07-09 PROCEDURE — 25000242 PHARM REV CODE 250 ALT 637 W/ HCPCS: Performed by: NURSE PRACTITIONER

## 2023-07-09 RX ORDER — FLUTICASONE PROPIONATE 50 MCG
2 SPRAY, SUSPENSION (ML) NASAL DAILY
Status: DISCONTINUED | OUTPATIENT
Start: 2023-07-09 | End: 2023-07-10 | Stop reason: HOSPADM

## 2023-07-09 RX ORDER — INSULIN ASPART 100 [IU]/ML
0-5 INJECTION, SOLUTION INTRAVENOUS; SUBCUTANEOUS
Status: DISCONTINUED | OUTPATIENT
Start: 2023-07-09 | End: 2023-07-09

## 2023-07-09 RX ORDER — CETIRIZINE HYDROCHLORIDE 10 MG/1
10 TABLET ORAL DAILY
Status: DISCONTINUED | OUTPATIENT
Start: 2023-07-09 | End: 2023-07-10 | Stop reason: HOSPADM

## 2023-07-09 RX ORDER — GLUCAGON 1 MG
1 KIT INJECTION
Status: DISCONTINUED | OUTPATIENT
Start: 2023-07-09 | End: 2023-07-09

## 2023-07-09 RX ORDER — INSULIN ASPART 100 [IU]/ML
0-5 INJECTION, SOLUTION INTRAVENOUS; SUBCUTANEOUS
Status: DISCONTINUED | OUTPATIENT
Start: 2023-07-09 | End: 2023-07-10 | Stop reason: HOSPADM

## 2023-07-09 RX ORDER — IBUPROFEN 200 MG
24 TABLET ORAL
Status: DISCONTINUED | OUTPATIENT
Start: 2023-07-09 | End: 2023-07-09

## 2023-07-09 RX ORDER — IBUPROFEN 200 MG
16 TABLET ORAL
Status: DISCONTINUED | OUTPATIENT
Start: 2023-07-09 | End: 2023-07-09

## 2023-07-09 RX ADMIN — FLUTICASONE PROPIONATE 100 MCG: 50 SPRAY, METERED NASAL at 09:07

## 2023-07-09 RX ADMIN — NYSTATIN 500000 UNITS: 100000 SUSPENSION ORAL at 01:07

## 2023-07-09 RX ADMIN — GABAPENTIN 100 MG: 100 CAPSULE ORAL at 09:07

## 2023-07-09 RX ADMIN — ACETAMINOPHEN 650 MG: 325 TABLET ORAL at 01:07

## 2023-07-09 RX ADMIN — VALPROIC ACID 250 MG: 250 CAPSULE, LIQUID FILLED ORAL at 09:07

## 2023-07-09 RX ADMIN — NYSTATIN 500000 UNITS: 100000 SUSPENSION ORAL at 09:07

## 2023-07-09 RX ADMIN — NYSTATIN 500000 UNITS: 100000 SUSPENSION ORAL at 07:07

## 2023-07-09 RX ADMIN — CHOLECALCIFEROL TAB 25 MCG (1000 UNIT) 1000 UNITS: 25 TAB at 09:07

## 2023-07-09 RX ADMIN — AZITHROMYCIN MONOHYDRATE 500 MG: 500 INJECTION, POWDER, LYOPHILIZED, FOR SOLUTION INTRAVENOUS at 11:07

## 2023-07-09 RX ADMIN — TRAZODONE HYDROCHLORIDE 50 MG: 50 TABLET ORAL at 09:07

## 2023-07-09 RX ADMIN — INSULIN ASPART 2 UNITS: 100 INJECTION, SOLUTION INTRAVENOUS; SUBCUTANEOUS at 05:07

## 2023-07-09 RX ADMIN — ESTRADIOL 1 MG: 0.5 TABLET ORAL at 09:07

## 2023-07-09 RX ADMIN — SODIUM CHLORIDE: 9 INJECTION, SOLUTION INTRAVENOUS at 04:07

## 2023-07-09 RX ADMIN — IPRATROPIUM BROMIDE AND ALBUTEROL SULFATE 3 ML: 2.5; .5 SOLUTION RESPIRATORY (INHALATION) at 07:07

## 2023-07-09 RX ADMIN — PRAVASTATIN SODIUM 40 MG: 40 TABLET ORAL at 09:07

## 2023-07-09 RX ADMIN — NYSTATIN 500000 UNITS: 100000 SUSPENSION ORAL at 05:07

## 2023-07-09 RX ADMIN — ASPIRIN 81 MG: 81 TABLET, COATED ORAL at 09:07

## 2023-07-09 RX ADMIN — AMLODIPINE BESYLATE 10 MG: 5 TABLET ORAL at 09:07

## 2023-07-09 RX ADMIN — SODIUM CHLORIDE: 9 INJECTION, SOLUTION INTRAVENOUS at 05:07

## 2023-07-09 RX ADMIN — PANTOPRAZOLE SODIUM 40 MG: 40 TABLET, DELAYED RELEASE ORAL at 09:07

## 2023-07-09 RX ADMIN — CETIRIZINE HYDROCHLORIDE 10 MG: 10 TABLET, FILM COATED ORAL at 09:07

## 2023-07-09 RX ADMIN — IPRATROPIUM BROMIDE AND ALBUTEROL SULFATE 3 ML: 2.5; .5 SOLUTION RESPIRATORY (INHALATION) at 01:07

## 2023-07-09 RX ADMIN — LEVOTHYROXINE SODIUM 50 MCG: 0.05 TABLET ORAL at 06:07

## 2023-07-09 RX ADMIN — CEFTRIAXONE 1 G: 1 INJECTION, POWDER, FOR SOLUTION INTRAMUSCULAR; INTRAVENOUS at 09:07

## 2023-07-09 RX ADMIN — ACETAMINOPHEN 650 MG: 325 TABLET ORAL at 09:07

## 2023-07-09 NOTE — PLAN OF CARE
Problem: Adult Inpatient Plan of Care  Goal: Patient-Specific Goal (Individualized)  Outcome: Ongoing, Progressing  Goal: Absence of Hospital-Acquired Illness or Injury  Outcome: Ongoing, Progressing  Intervention: Prevent Infection  Flowsheets (Taken 7/9/2023 0157)  Infection Prevention: hand hygiene promoted     Problem: Diabetes Comorbidity  Goal: Blood Glucose Level Within Targeted Range  Outcome: Ongoing, Progressing  Intervention: Monitor and Manage Glycemia  Flowsheets (Taken 7/9/2023 0157)  Glycemic Management: blood glucose monitored

## 2023-07-10 VITALS
RESPIRATION RATE: 18 BRPM | SYSTOLIC BLOOD PRESSURE: 156 MMHG | TEMPERATURE: 98 F | DIASTOLIC BLOOD PRESSURE: 78 MMHG | WEIGHT: 172 LBS | OXYGEN SATURATION: 94 % | BODY MASS INDEX: 31.65 KG/M2 | HEART RATE: 76 BPM | HEIGHT: 62 IN

## 2023-07-10 LAB
ANION GAP SERPL CALCULATED.3IONS-SCNC: 12 MMOL/L (ref 7–16)
BASOPHILS # BLD AUTO: 0.06 K/UL (ref 0–0.2)
BASOPHILS NFR BLD AUTO: 0.8 % (ref 0–1)
BUN SERPL-MCNC: 13 MG/DL (ref 7–18)
BUN/CREAT SERPL: 10 (ref 6–20)
CALCIUM SERPL-MCNC: 8.9 MG/DL (ref 8.5–10.1)
CHLORIDE SERPL-SCNC: 105 MMOL/L (ref 98–107)
CO2 SERPL-SCNC: 29 MMOL/L (ref 21–32)
CREAT SERPL-MCNC: 1.3 MG/DL (ref 0.55–1.02)
DIFFERENTIAL METHOD BLD: ABNORMAL
EGFR (NO RACE VARIABLE) (RUSH/TITUS): 45 ML/MIN/1.73M2
EOSINOPHIL # BLD AUTO: 0.45 K/UL (ref 0–0.5)
EOSINOPHIL NFR BLD AUTO: 5.7 % (ref 1–4)
ERYTHROCYTE [DISTWIDTH] IN BLOOD BY AUTOMATED COUNT: 15.5 % (ref 11.5–14.5)
GLUCOSE SERPL-MCNC: 169 MG/DL (ref 70–105)
GLUCOSE SERPL-MCNC: 198 MG/DL (ref 74–106)
HCT VFR BLD AUTO: 39.4 % (ref 38–47)
HGB BLD-MCNC: 12.4 G/DL (ref 12–16)
LYMPHOCYTES # BLD AUTO: 1.85 K/UL (ref 1–4.8)
LYMPHOCYTES NFR BLD AUTO: 23.2 % (ref 27–41)
MCH RBC QN AUTO: 28.6 PG (ref 27–31)
MCHC RBC AUTO-ENTMCNC: 31.5 G/DL (ref 32–36)
MCV RBC AUTO: 90.8 FL (ref 80–96)
MONOCYTES # BLD AUTO: 0.74 K/UL (ref 0–0.8)
MONOCYTES NFR BLD AUTO: 9.3 % (ref 2–6)
MPC BLD CALC-MCNC: 10.8 FL (ref 9.4–12.4)
NEUTROPHILS # BLD AUTO: 4.86 K/UL (ref 1.8–7.7)
NEUTROPHILS NFR BLD AUTO: 61 % (ref 53–65)
PLATELET # BLD AUTO: 246 K/UL (ref 150–400)
POTASSIUM SERPL-SCNC: 3.9 MMOL/L (ref 3.5–5.1)
RBC # BLD AUTO: 4.34 M/UL (ref 4.2–5.4)
SODIUM SERPL-SCNC: 142 MMOL/L (ref 136–145)
WBC # BLD AUTO: 7.96 K/UL (ref 4.5–11)

## 2023-07-10 PROCEDURE — 25000003 PHARM REV CODE 250: Performed by: FAMILY MEDICINE

## 2023-07-10 PROCEDURE — 99239 PR HOSPITAL DISCHARGE DAY,>30 MIN: ICD-10-PCS | Mod: ,,, | Performed by: FAMILY MEDICINE

## 2023-07-10 PROCEDURE — 85025 COMPLETE CBC W/AUTO DIFF WBC: CPT | Performed by: NURSE PRACTITIONER

## 2023-07-10 PROCEDURE — 99239 HOSP IP/OBS DSCHRG MGMT >30: CPT | Mod: ,,, | Performed by: FAMILY MEDICINE

## 2023-07-10 PROCEDURE — 80048 BASIC METABOLIC PNL TOTAL CA: CPT | Performed by: NURSE PRACTITIONER

## 2023-07-10 PROCEDURE — 99900035 HC TECH TIME PER 15 MIN (STAT)

## 2023-07-10 PROCEDURE — 25000003 PHARM REV CODE 250: Performed by: NURSE PRACTITIONER

## 2023-07-10 PROCEDURE — 82962 GLUCOSE BLOOD TEST: CPT

## 2023-07-10 PROCEDURE — 27000221 HC OXYGEN, UP TO 24 HOURS

## 2023-07-10 PROCEDURE — 25000242 PHARM REV CODE 250 ALT 637 W/ HCPCS: Performed by: NURSE PRACTITIONER

## 2023-07-10 PROCEDURE — 63600175 PHARM REV CODE 636 W HCPCS: Performed by: NURSE PRACTITIONER

## 2023-07-10 PROCEDURE — 94761 N-INVAS EAR/PLS OXIMETRY MLT: CPT

## 2023-07-10 PROCEDURE — 94640 AIRWAY INHALATION TREATMENT: CPT

## 2023-07-10 RX ORDER — ACETAMINOPHEN 325 MG/1
650 TABLET ORAL EVERY 8 HOURS PRN
Refills: 0
Start: 2023-07-10

## 2023-07-10 RX ORDER — NYSTATIN 100000 [USP'U]/ML
500000 SUSPENSION ORAL
Qty: 200 ML | Refills: 0 | Status: SHIPPED | OUTPATIENT
Start: 2023-07-10 | End: 2023-07-20

## 2023-07-10 RX ADMIN — PANTOPRAZOLE SODIUM 40 MG: 40 TABLET, DELAYED RELEASE ORAL at 09:07

## 2023-07-10 RX ADMIN — CHOLECALCIFEROL TAB 25 MCG (1000 UNIT) 1000 UNITS: 25 TAB at 09:07

## 2023-07-10 RX ADMIN — NYSTATIN 500000 UNITS: 100000 SUSPENSION ORAL at 07:07

## 2023-07-10 RX ADMIN — FLUTICASONE PROPIONATE 100 MCG: 50 SPRAY, METERED NASAL at 09:07

## 2023-07-10 RX ADMIN — LEVOTHYROXINE SODIUM 50 MCG: 0.05 TABLET ORAL at 06:07

## 2023-07-10 RX ADMIN — IPRATROPIUM BROMIDE AND ALBUTEROL SULFATE 3 ML: 2.5; .5 SOLUTION RESPIRATORY (INHALATION) at 01:07

## 2023-07-10 RX ADMIN — AZITHROMYCIN MONOHYDRATE 500 MG: 500 INJECTION, POWDER, LYOPHILIZED, FOR SOLUTION INTRAVENOUS at 10:07

## 2023-07-10 RX ADMIN — IPRATROPIUM BROMIDE AND ALBUTEROL SULFATE 3 ML: 2.5; .5 SOLUTION RESPIRATORY (INHALATION) at 07:07

## 2023-07-10 RX ADMIN — ESTRADIOL 1 MG: 0.5 TABLET ORAL at 09:07

## 2023-07-10 RX ADMIN — CETIRIZINE HYDROCHLORIDE 10 MG: 10 TABLET, FILM COATED ORAL at 09:07

## 2023-07-10 RX ADMIN — CEFTRIAXONE 1 G: 1 INJECTION, POWDER, FOR SOLUTION INTRAMUSCULAR; INTRAVENOUS at 09:07

## 2023-07-10 RX ADMIN — GABAPENTIN 100 MG: 100 CAPSULE ORAL at 09:07

## 2023-07-10 RX ADMIN — SODIUM CHLORIDE: 9 INJECTION, SOLUTION INTRAVENOUS at 01:07

## 2023-07-10 RX ADMIN — AMLODIPINE BESYLATE 10 MG: 5 TABLET ORAL at 09:07

## 2023-07-10 RX ADMIN — ASPIRIN 81 MG: 81 TABLET, COATED ORAL at 09:07

## 2023-07-10 NOTE — PROGRESS NOTES
"[facility]  Hospital Medicine  Progress Note    Patient Name: Rosalva Lopez  MRN: [id number]  Patient Class: IP- Inpatient   Admission Date: 7/5/2023  Length of Stay: 5 days  Attending Physician: Cheko Bonner IV, DO  Primary Care Provider: Robert Gallegos MD        Subjective:     Principal Problem:Community acquired bacterial pneumonia        HPI:      Presented with c/o epigastric pain and generalized abd "soreness" along with n/v x5 and diarrhea x1 since last night. Denies blood or mucus in emesis or stool. Notes has had similar symptoms during previous bouts of diverticulitis. States is concerned that the not fully cooked steak that she ate yesterday caused this. Reports that she ate potato salad as well but no other people got sick from it. She denies chest pain or dyspnea. O2 sat upon arrival was 88 on RA. Pt reports that she has ended up with pneumonia in the past from vomiting. Denies known lung disease. PMH HTN, DM, GERD, CHF (but is no longer on meds for CHF). Previous appendectomy, cholecystectomy, hysterectomy.     O2 sat 88% upon arrival. Started on O2 at 3L/min up to 91%.      EKG ordered and reviewed. It shows sinus tach with rate of 119. No changes from previous on 8/20/22. Labs ordered and reviewed. WBC 05093 with neutrophil 89.9%, H&H 17 and 52, plt [id number], BUN 19, creatinine 1.54 (similar to previous labs), glucose 261, CO2 19, AST 25, ALT 25, lipase 94, amylase 99, K+ 4.6, Na 137. Chest xray shows bilat pneumonia. Lacitic acid 3.5, Mg 1.4, serum ketone negative, blood cultures x 2 pending. CT chest abd pelvis noncontrasted ordered and reviewed. It shows diffuse multifocal pneumonia of the right lung more so than the left.     No acute abnormality of the abdomen or pelvis     NS bolus 1 liter given. Ondanseron 4 mg IVP given. Rocephin 1 Gm IVPB and azithromycin 500 mg IVPB given in the ED.  Pt stable. O2 sat 92% on O2 at 3L/min per NC. /58. HR " "improved 100. COVID 19 and flu are negative.  Discussed pt's case with Dr. Bonner. He accepted for admission for community acquired pneumonia.         Admitted to inpatient, spouse accompanying her on arrival. Talked with her code status and she chose Full code.       Overview/Hospital Course:  07/06/23 Awake and resting in bed. Chest is clear. Heart RRR. 02 sat is 93% on 3 liters. Denies cough. Denies any abdominal pain. States she is feeling somewhat better this morning. Has not vomited since before admission. White count is 19.24 today from 14.70 yesterday. Continue rocephin and Zithromax. Continue hydration.    07/07/23 States she is feeling better this morning. No nausea or vomiting reported/. Will increase to diabetic diet and cover with SSI. Reports occasional  nonproductive cough. Rhonchi to right lobes. White count down to 11,000. Afebrile. O2 sat on 3 liters is 93%. Will try to wean down and see how she does.      Went to check on pt. She was in restroom without o2. Assisted back to bed. O2 sat on room air is 88%. Silvia to 92% with o2 per nc at 3 liters.    7/8/23 Pt reports her mouth is getting sore and she thinks she has "yeast mouth." No visible erythema or rash/lesions. Ordering her Nystatin to use. Breath sounds show mild sandra rhonchi.  Cough sounds loose.  She remains on oxygen 2-3L via NC.  Lab stable. She is on day 3 of Rocephin/Azithromycin    07/09/23 Ms. Lopez is continuing to receive Rocephin and azithromycin. She is still requiring O2 at 2.5-3l/min per maintain sat > 92%. It has ranged 92-94% in the last 24 hours. RT reports that pt dropped down in the 80s on O2 at 2L/min per this am when trying to wean. She has been afebrile. Labs today show WBC 8550 with H&H 12.1 and 37.6, plt [id number], BUN 13, creatinine 1.28, Na 141, K+ 3.9. Her glucoses have ranged 171-233 in the last 24 hours. She is started on sliding scale insulin low dose today. Upon evaluation this morning, she c/o sinus congestion and " headache. She says that she usually takes Allegra daily for her sinuses. She is started on Fluticasone nasal spray and cetirizine today.      Interval History: Discussed pt's case with Dr. Lane this am. Will continue current care.    Review of Systems   Constitutional:  Negative for chills, diaphoresis and fever.   HENT:  Positive for sinus pressure and sinus pain. Negative for mouth sores and sore throat.    Respiratory:  Positive for cough and shortness of breath.    Cardiovascular:  Negative for chest pain.   Gastrointestinal:  Negative for abdominal pain, nausea and vomiting.   Genitourinary:  Negative for difficulty urinating.   Neurological:  Positive for headaches.   Psychiatric/Behavioral: Negative.     Objective:     Vital Signs (Most Recent):  Temp: 97.8 °F (36.6 °C) (07/10/23 0400)  Pulse: 78 (07/10/23 0400)  Resp: 20 (07/10/23 0400)  BP: 134/74 (07/10/23 0400)  SpO2: (!) 92 % (07/10/23 0400) Vital Signs (24h Range):  Temp:  [97.8 °F (36.6 °C)-98.9 °F (37.2 °C)] 97.8 °F (36.6 °C)  Pulse:  [72-87] 78  Resp:  [18-20] 20  SpO2:  [89 %-95 %] 92 %  BP: (134-145)/(70-78) 134/74     Weight: 78 kg (172 lb)  Body mass index is 31.46 kg/m².    Intake/Output Summary (Last 24 hours) at 7/10/2023 0458  Last data filed at 7/10/2023 0009  Gross per 24 hour   Intake 980 ml   Output --   Net 980 ml         Physical Exam  Vitals and nursing note reviewed.   Constitutional:       General: She is awake. She is not in acute distress.     Appearance: Normal appearance. She is obese. She is ill-appearing. She is not toxic-appearing or diaphoretic.   HENT:      Head: Normocephalic and atraumatic.      Nose: Congestion present.      Mouth/Throat:      Lips: Pink. No lesions.      Mouth: Mucous membranes are moist.      Tongue: No lesions.      Palate: No lesions.      Pharynx: Oropharynx is clear. Uvula midline. No pharyngeal swelling, oropharyngeal exudate, posterior oropharyngeal erythema or uvula swelling.   Eyes:       Extraocular Movements: Extraocular movements intact.      Conjunctiva/sclera: Conjunctivae normal.   Cardiovascular:      Rate and Rhythm: Normal rate and regular rhythm.      Pulses: Normal pulses.      Heart sounds: Normal heart sounds. No murmur heard.  Pulmonary:      Effort: Pulmonary effort is normal. No respiratory distress.      Breath sounds: Normal air entry. No stridor, decreased air movement or transmitted upper airway sounds. No decreased breath sounds, wheezing, rhonchi or rales.   Abdominal:      General: Bowel sounds are normal.      Palpations: Abdomen is soft.      Tenderness: There is no abdominal tenderness.   Musculoskeletal:         General: Normal range of motion.      Cervical back: Normal range of motion. No rigidity.   Skin:     General: Skin is warm and dry.      Capillary Refill: Capillary refill takes less than 2 seconds.   Neurological:      General: No focal deficit present.      Mental Status: She is alert and oriented to person, place, and time.   Psychiatric:         Mood and Affect: Mood normal.         Behavior: Behavior is cooperative.           Significant Labs: All pertinent labs within the past 24 hours have been reviewed.  BMP:   Recent Labs   Lab 07/09/23  0550   *      K 3.9      CO2 28   BUN 13   CREATININE 1.28*   CALCIUM 8.9     CBC:   Recent Labs   Lab 07/08/23  0541 07/09/23  0550   WBC 10.37 8.55   HGB 12.2 12.1   HCT 38.3 37.6*    237       Significant Imaging:  NA      Assessment/Plan:      * Community acquired bacterial pneumonia    07/06/23 continue 02 per nc at 3 liters   continue duonebs   continue rocephin and Zithromax  Blood cultures pending    07/07/23 WBC down 11,000 , afebrile  Try to wean o2 down as tolerated   Continue rocephin and zithromax  Blood cultures show no growth     07/09/23 WBC 8550  Afebrile  Continue Rocephin and azithromycin  O2 at 2.5-3L/min per NC to maintain sats 92-94%    Convulsions, unspecified convulsion  type    07/05/23 continue valproic acid 250 mg po bid    Gastroesophageal reflux disease without esophagitis    07/05/23 continue protonix 40 mg po daily    Acquired hypothyroidism    07/05/23 continue levothyroxine 50 mcg po daily      Essential hypertension  07/05/23 continue amlodipine 10 mg po daily        Recurrent major depressive disorder, in partial remission  07/05/23 continue trazodone 50 mg po daily        Neuropathy of both feet    07/05/23 gabapentin 100 mg po BID     Mixed hyperlipidemia  07/05/23 continue pravastatin 40 mg po at hs daily      Type 2 diabetes mellitus with diabetic neuropathy, without long-term current use of insulin    Lab Results   Component Value Date    HGBA1C 6.7 (H) 07/05/2023       Hold Oral hypoglycemics while patient is in the hospital. Home med is glipizide 5 mg po bid  07/05/23 SSI coverage    07/06 stable    07/07/23 start diabetic diet and cover with SSI  07/09/23 Glucose 171-233   Started on low dose SSI      VTE Risk Mitigation (From admission, onward)         Ordered     enoxaparin injection 40 mg  Daily         07/05/23 1110     IP VTE HIGH RISK PATIENT  Once         07/05/23 1110     Place ELLY hose  Until discontinued         07/05/23 1110                Discharge Planning   ALBARO:      Code Status: Full Code   Is the patient medically ready for discharge?:     Reason for patient still in hospital (select all that apply): Treatment  Discharge Plan A: Home, Home with family                  Jerilyn Diaz NP  Department of Hospital Medicine   [facility]

## 2023-07-10 NOTE — DISCHARGE SUMMARY
"[facility] Unit  Hospital Medicine  Discharge Summary      Patient Name: Rosalva Lopez  MRN: [id number]  YAMILET: [id number]  Patient Class: IP- Inpatient  Admission Date: 7/5/2023  Hospital Length of Stay: 5 days  Discharge Date and Time:  07/10/2023 9:01 AM  Attending Physician: Cheko Bonner IV, DO   Discharging Provider: DEBORAH Newsome  Primary Care Provider: Robert Gallegos MD    Primary Care Team: Networked reference to record PCT     HPI:       Presented with c/o epigastric pain and generalized abd "soreness" along with n/v x5 and diarrhea x1 since last night. Denies blood or mucus in emesis or stool. Notes has had similar symptoms during previous bouts of diverticulitis. States is concerned that the not fully cooked steak that she ate yesterday caused this. Reports that she ate potato salad as well but no other people got sick from it. She denies chest pain or dyspnea. O2 sat upon arrival was 88 on RA. Pt reports that she has ended up with pneumonia in the past from vomiting. Denies known lung disease. PMH HTN, DM, GERD, CHF (but is no longer on meds for CHF). Previous appendectomy, cholecystectomy, hysterectomy.     O2 sat 88% upon arrival. Started on O2 at 3L/min up to 91%.      EKG ordered and reviewed. It shows sinus tach with rate of 119. No changes from previous on 8/20/22. Labs ordered and reviewed. WBC 24419 with neutrophil 89.9%, H&H 17 and 52, plt [id number], BUN 19, creatinine 1.54 (similar to previous labs), glucose 261, CO2 19, AST 25, ALT 25, lipase 94, amylase 99, K+ 4.6, Na 137. Chest xray shows bilat pneumonia. Lacitic acid 3.5, Mg 1.4, serum ketone negative, blood cultures x 2 pending. CT chest abd pelvis noncontrasted ordered and reviewed. It shows diffuse multifocal pneumonia of the right lung more so than the left.     No acute abnormality of the abdomen or pelvis     NS bolus 1 liter given. Ondanseron 4 mg IVP given. Rocephin 1 Gm IVPB and " "azithromycin 500 mg IVPB given in the ED.  Pt stable. O2 sat 92% on O2 at 3L/min per NC. /58. HR improved 100. COVID 19 and flu are negative.  Discussed pt's case with Dr. Bonner. He accepted for admission for community acquired pneumonia.         Admitted to inpatient, spouse accompanying her on arrival. Talked with her code status and she chose Full code.       * No surgery found *      Hospital Course:   07/06/23 Awake and resting in bed. Chest is clear. Heart RRR. 02 sat is 93% on 3 liters. Denies cough. Denies any abdominal pain. States she is feeling somewhat better this morning. Has not vomited since before admission. White count is 19.24 today from 14.70 yesterday. Continue rocephin and Zithromax. Continue hydration.    07/07/23 States she is feeling better this morning. No nausea or vomiting reported/. Will increase to diabetic diet and cover with SSI. Reports occasional  nonproductive cough. Rhonchi to right lobes. White count down to 11,000. Afebrile. O2 sat on 3 liters is 93%. Will try to wean down and see how she does.      Went to check on pt. She was in restroom without o2. Assisted back to bed. O2 sat on room air is 88%. Silvia to 92% with o2 per nc at 3 liters.    7/8/23 Pt reports her mouth is getting sore and she thinks she has "yeast mouth." No visible erythema or rash/lesions. Ordering her Nystatin to use. Breath sounds show mild sandra rhonchi.  Cough sounds loose.  She remains on oxygen 2-3L via NC.  Lab stable. She is on day 3 of Rocephin/Azithromycin    07/09/23 Ms. Lopez is continuing to receive Rocephin and azithromycin. She is still requiring O2 at 2.5-3l/min per maintain sat > 92%. It has ranged 92-94% in the last 24 hours. RT reports that pt dropped down in the 80s on O2 at 2L/min per this am when trying to wean. She has been afebrile. Labs today show WBC 8550 with H&H 12.1 and 37.6, plt [id number], BUN 13, creatinine 1.28, Na 141, K+ 3.9. Her glucoses have ranged 171-233 in the last 24 " hours. She is started on sliding scale insulin low dose today. Upon evaluation this morning, she c/o sinus congestion and headache. She says that she usually takes Allegra daily for her sinuses. She is started on Fluticasone nasal spray and cetirizine today.    07/10/23 Chest is clear. Sat drops to 88% on room air. States she has hx of sarcoidosis and sleep apnea but she has not seen in pulmonologist in years. States she has an old cpap at home but she has not worn it in years because the mask caused her trigeminal neuralgia to worsen. Home o2 per nc at 2 liters ordered. Talked with her follow up with Dr. Robert Gallegos her PCP. Talked with her re need of follow up with pulmonology and that this can be arranged through her PCP. Medication reconciliation done.       Goals of Care Treatment Preferences:  Code Status: Full Code      Consults:     Pulmonary  * Community acquired bacterial pneumonia    07/06/23 continue 02 per nc at 3 liters   continue duonebs   continue rocephin and Zithromax  Blood cultures pending    07/07/23 WBC down 11,000 , afebrile  Try to wean o2 down as tolerated   Continue rocephin and zithromax  Blood cultures show no growth     07/09/23 WBC 8550  Afebrile  Continue Rocephin and azithromycin  O2 at 2.5-3L/min per NC to maintain sats 92-94%    07/10/23 will complete 6 doses of rocephin and azithromycin before dc today        Cardiac/Vascular  Essential hypertension  07/05/23 continue amlodipine 10 mg po daily    07/10/23 continue amlodipine    Mixed hyperlipidemia  07/05/23 continue pravastatin 40 mg po at hs daily      07/10/23 continue pravastatin    Endocrine  Acquired hypothyroidism    07/05/23 continue levothyroxine 50 mcg po daily      07/10/23 continue levothyroxine     Type 2 diabetes mellitus with diabetic neuropathy, without long-term current use of insulin    Lab Results   Component Value Date    HGBA1C 6.7 (H) 07/05/2023       Hold Oral hypoglycemics while patient is in the hospital.  Home med is glipizide 5 mg po bid  07/05/23 SSI coverage    07/06 stable    07/07/23 start diabetic diet and cover with SSI  07/09/23 Glucose 171-233   Started on low dose SSI      07/10/23 continue glipizide and ozempic       Final Active Diagnoses:    Diagnosis Date Noted POA    PRINCIPAL PROBLEM:  Community acquired bacterial pneumonia [J15.9] 07/05/2023 Yes    Gastroesophageal reflux disease without esophagitis [K21.9] 07/26/2022 Yes     Chronic    Acquired hypothyroidism [E03.9] 07/26/2022 Yes     Chronic    Essential hypertension [I10] 05/13/2022 Yes    Recurrent major depressive disorder, in partial remission [F33.41] 03/29/2022 Yes    Neuropathy of both feet [G57.93] 02/18/2022 Yes     Chronic    Type 2 diabetes mellitus with diabetic neuropathy, without long-term current use of insulin [E11.40] 12/01/2021 Yes     Chronic    Mixed hyperlipidemia [E78.2] 12/01/2021 Yes     Chronic      Problems Resolved During this Admission:    Diagnosis Date Noted Date Resolved POA    Asymptomatic postsurgical menopause [E89.40] 07/26/2022 07/05/2023 Yes     Chronic       Discharged Condition: stable    Disposition: Home-Health Care [facility]    Follow Up:   Follow-up Information       Robert Gallegos MD Follow up in 1 week(s).    Specialty: Family Medicine  Contact information:  [address] [facility]  [place] [facility] [place] [address]  [phone]                           Patient Instructions:      OXYGEN FOR HOME USE     Order Specific Question Answer Comments   Liter Flow 2    Duration Continuous    Qualifying Test Performed at: Rest    Oxygen saturation: 88    Portable mode: continuous    Route nasal cannula    Device: home concentrator with portable tanks    Length of need (in months): 99 mos    Patient condition with qualifying saturation Other - List qualifying diagnosis and code asbestosis   Select a diagnosis & list the code in the comments SOB (shortness of breath) on exertion [150486]    Height: 5'  "2" (1.575 m)    Weight: 78 kg (172 lb)    Alternative treatment measures have been tried or considered and deemed clinically ineffective. Yes      Ambulatory referral/consult to Outpatient Case Management   Referral Priority: Routine Referral Type: Consultation   Referral Reason: Specialty Services Required   Number of Visits Requested: 1     Diet diabetic       Significant Diagnostic Studies: Labs: All labs within the past 24 hours have been reviewed    Pending Diagnostic Studies:       None           Medications:  Reconciled Home Medications:      Medication List        START taking these medications      acetaminophen 325 MG tablet  Commonly known as: TYLENOL  Take 2 tablets (650 mg total) by mouth every 8 (eight) hours as needed.     nystatin 100,000 unit/mL suspension  Commonly known as: MYCOSTATIN  Take 5 mLs (500,000 Units total) by mouth 4 (four) times daily with meals and nightly. for 10 days            CONTINUE taking these medications      albuterol 90 mcg/actuation inhaler  Commonly known as: VENTOLIN HFA  Inhale 2 puffs into the lungs every 6 (six) hours as needed for Wheezing or Shortness of Breath. Rescue     amLODIPine 10 MG tablet  Commonly known as: NORVASC  Take 1 tablet (10 mg total) by mouth once daily.     aspirin 81 MG EC tablet  Commonly known as: ECOTRIN  Take 81 mg by mouth once daily.     diclofenac sodium 1 % Gel  Commonly known as: VOLTAREN  Apply 2 g topically 4 (four) times daily.     ergocalciferol 50,000 unit Cap  Commonly known as: ERGOCALCIFEROL  Take 5,000 Units by mouth once daily.     estradioL 1 MG tablet  Commonly known as: ESTRACE  Take 1 tablet (1 mg total) by mouth once daily.     gabapentin 100 MG capsule  Commonly known as: NEURONTIN  Take two capsules by mouth twice daily.     glipiZIDE 5 MG tablet  Commonly known as: GLUCOTROL  Take 1 tablet (5 mg total) by mouth 2 (two) times daily with meals.     levothyroxine 50 MCG tablet  Commonly known as: SYNTHROID  Take 1 tablet " (50 mcg total) by mouth before breakfast.     LORazepam 0.5 MG tablet  Commonly known as: ATIVAN  Take 1 tablet (0.5 mg total) by mouth every 6 (six) hours as needed for Anxiety.     OZEMPIC 0.25 mg or 0.5 mg(2 mg/1.5 mL) pen injector  Generic drug: semaglutide  Inject 0.25 mg into the skin every 7 days.     pantoprazole 40 MG tablet  Commonly known as: PROTONIX  Take 1 tablet (40 mg total) by mouth once daily.     pravastatin 40 MG tablet  Commonly known as: PRAVACHOL  Take 1 tablet (40 mg total) by mouth every evening.     traZODone 50 MG tablet  Commonly known as: DESYREL  Take 1 tablet (50 mg total) by mouth once daily.     valproic acid 250 mg capsule  Commonly known as: DEPAKENE  Take 1 capsule (250 mg total) by mouth 2 (two) times daily.              Indwelling Lines/Drains at time of discharge:   Lines/Drains/Airways       None                   Time spent on the discharge of patient: 33 minutes         Cheko Bonner IV, DO  Department of Hospital Medicine  [facility]

## 2023-07-10 NOTE — PLAN OF CARE
[facility]  Discharge Final Note    Primary Care Provider: Robert Gallegos MD    Expected Discharge Date: 7/10/2023    Final Discharge Note (most recent)       Final Note - 07/10/23 1040          Final Note    Assessment Type Final Discharge Note (P)      Anticipated Discharge Disposition Home-Health Care Svc (P)      What phone number can be called within the next 1-3 days to see how you are doing after discharge? [phone] (P)      Hospital Resources/Appts/Education Provided Provided patient/caregiver with written discharge plan information (P)         Post-Acute Status    Post-Acute Authorization Home Health (P)      Home Health Status Referrals Sent (P)      Patient choice form signed by patient/caregiver List with quality metrics by geographic area provided (P)      Discharge Delays None known at this time (P)                      Important Message from Medicare  Important Message from Medicare regarding Discharge Appeal Rights: Given to patient/caregiver, Explained to patient/caregiver, Signed/date by patient/caregiver     Date IMM was signed: 07/10/23  Time IMM was signed: 0800    Contact Info       Robert Gallegos MD   Specialty: Family Medicine   Relationship: PCP - General    [address] [facility] [address] [facility]  [facility] [place] [address]   Phone: [phone]       Next Steps: Follow up in 1 week(s)        Ms. Lopez will discharge home today with [facility] to follow.  Her Oxygen was ordered from [facility] earlier this morning. No other equipment required.

## 2023-07-10 NOTE — ASSESSMENT & PLAN NOTE
07/05/23 continue levothyroxine 50 mcg po daily    
  07/05/23 continue levothyroxine 50 mcg po daily    
  07/05/23 continue levothyroxine 50 mcg po daily      07/10/23 continue levothyroxine   
  07/05/23 continue protonix 40 mg po daily  
  07/05/23 continue protonix 40 mg po daily  
  07/05/23 continue valproic acid 250 mg po bid  
  07/05/23 gabapentin 100 mg po BID   
  07/05/23 gabapentin 100 mg po BID   
  07/06/23 continue 02 per nc at 3 liters   continue duonebs   continue rocephin and Zithromax  Blood cultures pending  
  07/06/23 continue 02 per nc at 3 liters   continue duonebs   continue rocephin and Zithromax  Blood cultures pending    07/07/23 WBC down 11,000 , afebrile  Try to wean o2 down as tolerated   Continue rocephin and zithromax  Blood cultures show no growth   
  07/06/23 continue 02 per nc at 3 liters   continue duonebs   continue rocephin and Zithromax  Blood cultures pending    07/07/23 WBC down 11,000 , afebrile  Try to wean o2 down as tolerated   Continue rocephin and zithromax  Blood cultures show no growth     07/09/23 WBC 8550  Afebrile  Continue Rocephin and azithromycin  O2 at 2.5-3L/min per NC to maintain sats 92-94%  
  07/06/23 continue 02 per nc at 3 liters   continue duonebs   continue rocephin and Zithromax  Blood cultures pending    07/07/23 WBC down 11,000 , afebrile  Try to wean o2 down as tolerated   Continue rocephin and zithromax  Blood cultures show no growth     07/09/23 WBC 8550  Afebrile  Continue Rocephin and azithromycin  O2 at 2.5-3L/min per NC to maintain sats 92-94%    07/10/23 will complete 6 doses of rocephin and azithromycin before dc today      
  Lab Results   Component Value Date    HGBA1C 6.7 (H) 07/05/2023       Hold Oral hypoglycemics while patient is in the hospital. Home med is glipizide 5 mg po bid  07/05/23 SSI coverage    07/06 stable  
  Lab Results   Component Value Date    HGBA1C 6.7 (H) 07/05/2023       Hold Oral hypoglycemics while patient is in the hospital. Home med is glipizide 5 mg po bid  07/05/23 SSI coverage    07/06 stable    07/07/23 start diabetic diet and cover with SSI  
  Lab Results   Component Value Date    HGBA1C 6.7 (H) 07/05/2023       Hold Oral hypoglycemics while patient is in the hospital. Home med is glipizide 5 mg po bid  07/05/23 SSI coverage    07/06 stable    07/07/23 start diabetic diet and cover with SSI  07/09/23 Glucose 171-233   Started on low dose SSI  
  Lab Results   Component Value Date    HGBA1C 6.7 (H) 07/05/2023       Hold Oral hypoglycemics while patient is in the hospital. Home med is glipizide 5 mg po bid  07/05/23 SSI coverage    07/06 stable    07/07/23 start diabetic diet and cover with SSI  07/09/23 Glucose 171-233   Started on low dose SSI      07/10/23 continue glipizide and ozempic   
  Lab Results   Component Value Date    HGBA1C 7.2 (H) 02/28/2023       Hold Oral hypoglycemics while patient is in the hospital. Home med is glipizide 5 mg po bid  07/05/23 SSI coverage  
07/05/23 continue amlodipine 10 mg po daily      
07/05/23 continue amlodipine 10 mg po daily      
07/05/23 continue amlodipine 10 mg po daily    07/10/23 continue amlodipine  
07/05/23 continue pravastatin 40 mg po at hs daily    
07/05/23 continue pravastatin 40 mg po at hs daily    
07/05/23 continue pravastatin 40 mg po at hs daily      07/10/23 continue pravastatin  
07/05/23 continue trazodone 50 mg po daily      
07/05/23 continue trazodone 50 mg po daily      
Father  Still living? No  Family history of heart disease, Age at diagnosis: Age Unknown  Family history of heart disease, Age at diagnosis: Age Unknown

## 2023-07-10 NOTE — NURSING
Patient discharged to home with home health. All patients home medications returned to her. Discharge instructions and info given to patient. Will go home on oxygen. Medical store delivered oxygen to room. Will go home in private car with her .

## 2023-07-10 NOTE — PLAN OF CARE
Problem: Adult Inpatient Plan of Care  Goal: Patient-Specific Goal (Individualized)  Outcome: Ongoing, Progressing  Goal: Absence of Hospital-Acquired Illness or Injury  Outcome: Ongoing, Progressing  Intervention: Prevent Infection  Flowsheets (Taken 7/9/2023 2309)  Infection Prevention: hand hygiene promoted     Problem: Diabetes Comorbidity  Goal: Blood Glucose Level Within Targeted Range  Outcome: Ongoing, Progressing  Intervention: Monitor and Manage Glycemia  Flowsheets (Taken 7/9/2023 2309)  Glycemic Management:   blood glucose monitored   supplemental insulin given

## 2023-07-10 NOTE — PLAN OF CARE
Problem: Adult Inpatient Plan of Care  Goal: Plan of Care Review  Outcome: Met  Goal: Patient-Specific Goal (Individualized)  Outcome: Met  Goal: Absence of Hospital-Acquired Illness or Injury  Outcome: Met  Goal: Optimal Comfort and Wellbeing  Outcome: Met  Goal: Readiness for Transition of Care  Outcome: Met     Problem: Diabetes Comorbidity  Goal: Blood Glucose Level Within Targeted Range  Outcome: Met     Problem: Fluid Imbalance (Pneumonia)  Goal: Fluid Balance  Outcome: Met     Problem: Infection (Pneumonia)  Goal: Resolution of Infection Signs and Symptoms  Outcome: Met     Problem: Respiratory Compromise (Pneumonia)  Goal: Effective Oxygenation and Ventilation  Outcome: Met     Problem: Fall Injury Risk  Goal: Absence of Fall and Fall-Related Injury  Outcome: Met

## 2023-07-10 NOTE — SUBJECTIVE & OBJECTIVE
Interval History: Discussed pt's case with Dr. Lane this am. Will continue current care.    Review of Systems   Constitutional:  Negative for chills, diaphoresis and fever.   HENT:  Positive for sinus pressure and sinus pain. Negative for mouth sores and sore throat.    Respiratory:  Positive for cough and shortness of breath.    Cardiovascular:  Negative for chest pain.   Gastrointestinal:  Negative for abdominal pain, nausea and vomiting.   Genitourinary:  Negative for difficulty urinating.   Neurological:  Positive for headaches.   Psychiatric/Behavioral: Negative.     Objective:     Vital Signs (Most Recent):  Temp: 97.8 °F (36.6 °C) (07/10/23 0400)  Pulse: 78 (07/10/23 0400)  Resp: 20 (07/10/23 0400)  BP: 134/74 (07/10/23 0400)  SpO2: (!) 92 % (07/10/23 0400) Vital Signs (24h Range):  Temp:  [97.8 °F (36.6 °C)-98.9 °F (37.2 °C)] 97.8 °F (36.6 °C)  Pulse:  [72-87] 78  Resp:  [18-20] 20  SpO2:  [89 %-95 %] 92 %  BP: (134-145)/(70-78) 134/74     Weight: 78 kg (172 lb)  Body mass index is 31.46 kg/m².    Intake/Output Summary (Last 24 hours) at 7/10/2023 0458  Last data filed at 7/10/2023 0009  Gross per 24 hour   Intake 980 ml   Output --   Net 980 ml         Physical Exam  Vitals and nursing note reviewed.   Constitutional:       General: She is awake. She is not in acute distress.     Appearance: Normal appearance. She is obese. She is ill-appearing. She is not toxic-appearing or diaphoretic.   HENT:      Head: Normocephalic and atraumatic.      Nose: Congestion present.      Mouth/Throat:      Lips: Pink. No lesions.      Mouth: Mucous membranes are moist.      Tongue: No lesions.      Palate: No lesions.      Pharynx: Oropharynx is clear. Uvula midline. No pharyngeal swelling, oropharyngeal exudate, posterior oropharyngeal erythema or uvula swelling.   Eyes:      Extraocular Movements: Extraocular movements intact.      Conjunctiva/sclera: Conjunctivae normal.   Cardiovascular:      Rate and Rhythm: Normal  rate and regular rhythm.      Pulses: Normal pulses.      Heart sounds: Normal heart sounds. No murmur heard.  Pulmonary:      Effort: Pulmonary effort is normal. No respiratory distress.      Breath sounds: Normal air entry. No stridor, decreased air movement or transmitted upper airway sounds. No decreased breath sounds, wheezing, rhonchi or rales.   Abdominal:      General: Bowel sounds are normal.      Palpations: Abdomen is soft.      Tenderness: There is no abdominal tenderness.   Musculoskeletal:         General: Normal range of motion.      Cervical back: Normal range of motion. No rigidity.   Skin:     General: Skin is warm and dry.      Capillary Refill: Capillary refill takes less than 2 seconds.   Neurological:      General: No focal deficit present.      Mental Status: She is alert and oriented to person, place, and time.   Psychiatric:         Mood and Affect: Mood normal.         Behavior: Behavior is cooperative.           Significant Labs: All pertinent labs within the past 24 hours have been reviewed.  BMP:   Recent Labs   Lab 07/09/23  0550   *      K 3.9      CO2 28   BUN 13   CREATININE 1.28*   CALCIUM 8.9     CBC:   Recent Labs   Lab 07/08/23  0541 07/09/23  0550   WBC 10.37 8.55   HGB 12.2 12.1   HCT 38.3 37.6*    237       Significant Imaging:  NA

## 2023-07-10 NOTE — DISCHARGE INSTRUCTIONS
Discharge to home with Home Health services.  Diabetic Diet.  Oxygen 2 liters per nasal cannula.

## 2023-07-11 ENCOUNTER — PATIENT OUTREACH (OUTPATIENT)
Dept: ADMINISTRATIVE | Facility: CLINIC | Age: 68
End: 2023-07-11

## 2023-07-11 LAB
BACTERIA BLD CULT: NORMAL
BACTERIA BLD CULT: NORMAL

## 2023-07-11 NOTE — PROGRESS NOTES
C3 nurse spoke with Rosalva Lopez  for a TCC post hospital discharge follow up call. The patient has a scheduled HOSFU appointment with Robert Gallegos MD  on 7/18/23 @ 1100.

## 2023-07-12 NOTE — PROGRESS NOTES
C3 nurse spoke with Rosalva Lopez to complete medication review. The patient has a scheduled [facility] appointment with Robert Gallegos MD  on 7/18/23 @ 1100.

## 2023-07-14 ENCOUNTER — TELEPHONE (OUTPATIENT)
Dept: MEDSURG UNIT | Facility: HOSPITAL | Age: 68
End: 2023-07-14
Payer: MEDICARE

## 2023-07-14 NOTE — TELEPHONE ENCOUNTER
Spoke with MsBronwyn John she states I've been having a little chest pain, I think I got something out of line lying in that bed up there and I need to go see the chiropractor. She denied any radiation of pain, n/v or no increased shortness of breath.  She says she wears her oxygen all the time.

## 2023-07-18 ENCOUNTER — OFFICE VISIT (OUTPATIENT)
Dept: FAMILY MEDICINE | Facility: CLINIC | Age: 68
End: 2023-07-18
Payer: MEDICARE

## 2023-07-18 VITALS
HEIGHT: 62 IN | SYSTOLIC BLOOD PRESSURE: 116 MMHG | HEART RATE: 85 BPM | BODY MASS INDEX: 31.65 KG/M2 | OXYGEN SATURATION: 96 % | DIASTOLIC BLOOD PRESSURE: 75 MMHG | WEIGHT: 172 LBS

## 2023-07-18 DIAGNOSIS — G57.93 NEUROPATHY OF BOTH FEET: Chronic | ICD-10-CM

## 2023-07-18 DIAGNOSIS — E11.40 TYPE 2 DIABETES MELLITUS WITH DIABETIC NEUROPATHY, WITHOUT LONG-TERM CURRENT USE OF INSULIN: Chronic | ICD-10-CM

## 2023-07-18 DIAGNOSIS — E03.9 ACQUIRED HYPOTHYROIDISM: Chronic | ICD-10-CM

## 2023-07-18 DIAGNOSIS — E89.40 ASYMPTOMATIC POSTSURGICAL MENOPAUSE: Chronic | ICD-10-CM

## 2023-07-18 DIAGNOSIS — I70.0 AORTIC ATHEROSCLEROSIS: ICD-10-CM

## 2023-07-18 DIAGNOSIS — K21.9 GASTROESOPHAGEAL REFLUX DISEASE WITHOUT ESOPHAGITIS: Chronic | ICD-10-CM

## 2023-07-18 DIAGNOSIS — I10 ESSENTIAL HYPERTENSION: ICD-10-CM

## 2023-07-18 DIAGNOSIS — F33.41 RECURRENT MAJOR DEPRESSIVE DISORDER, IN PARTIAL REMISSION: Chronic | ICD-10-CM

## 2023-07-18 DIAGNOSIS — J15.9 COMMUNITY ACQUIRED BACTERIAL PNEUMONIA: Primary | ICD-10-CM

## 2023-07-18 PROCEDURE — 99495 TCM SERVICES (MODERATE COMPLEXITY): ICD-10-PCS | Mod: ,,, | Performed by: FAMILY MEDICINE

## 2023-07-18 PROCEDURE — 99495 TRANSJ CARE MGMT MOD F2F 14D: CPT | Mod: ,,, | Performed by: FAMILY MEDICINE

## 2023-07-18 RX ORDER — PANTOPRAZOLE SODIUM 40 MG/1
40 TABLET, DELAYED RELEASE ORAL DAILY
Qty: 90 TABLET | Refills: 1 | Status: SHIPPED | OUTPATIENT
Start: 2023-07-18 | End: 2024-01-31 | Stop reason: SDUPTHER

## 2023-07-18 RX ORDER — TRAZODONE HYDROCHLORIDE 50 MG/1
50 TABLET ORAL DAILY
Qty: 90 TABLET | Refills: 1 | Status: SHIPPED | OUTPATIENT
Start: 2023-07-18

## 2023-07-18 RX ORDER — LEVOTHYROXINE SODIUM 50 UG/1
50 TABLET ORAL
Qty: 90 TABLET | Refills: 1 | Status: SHIPPED | OUTPATIENT
Start: 2023-07-18 | End: 2024-03-27 | Stop reason: SDUPTHER

## 2023-07-18 RX ORDER — ESTRADIOL 1 MG/1
1 TABLET ORAL DAILY
Qty: 90 TABLET | Refills: 1 | Status: SHIPPED | OUTPATIENT
Start: 2023-07-18 | End: 2024-01-31 | Stop reason: SDUPTHER

## 2023-07-18 RX ORDER — GABAPENTIN 100 MG/1
CAPSULE ORAL
Qty: 360 CAPSULE | Refills: 1 | Status: SHIPPED | OUTPATIENT
Start: 2023-07-18 | End: 2024-03-27 | Stop reason: SDUPTHER

## 2023-07-18 RX ORDER — AMLODIPINE BESYLATE 10 MG/1
10 TABLET ORAL DAILY
Qty: 90 TABLET | Refills: 1 | Status: SHIPPED | OUTPATIENT
Start: 2023-07-18 | End: 2024-02-29 | Stop reason: SDUPTHER

## 2023-07-18 NOTE — PROGRESS NOTES
Transitional Care Note  Subjective:       Patient ID: Rosalva Lopez is a 68 y.o. female.  Chief Complaint: Hospital Follow Up (Hospital follow-up status post hospitalization 7/5-7/10 at [facility] for community acquired pneumonia. Patient doing better since discharge. Hospital and discharge medications reconciled. )    Family and/or Caretaker present at visit?  Yes.  Diagnostic tests reviewed/disposition: No diagnosic tests pending after this hospitalization.  Disease/illness education: given at time of discharge  Home health/community services discussion/referrals: Patient has home health established at [facility] (per pt )   Establishment or re-establishment of referral orders for community resources: No other necessary community resources.   Discussion with other health care providers: No discussion with other health care providers necessary.   Recently admitted for treatment of multifocal pneumonia.  Was discharged with portable O2.  Says that she is doing well.  Denies any chest pain, weakness or SOB.  No fever or chills.  Denies any productive cough.    Review of Systems   Constitutional:  Negative for chills, diaphoresis and fever.   HENT:  Negative for sore throat.    Respiratory:  Positive for cough. Negative for chest tightness.    Cardiovascular:  Negative for chest pain and palpitations.   Gastrointestinal:  Negative for diarrhea, nausea and vomiting.   Neurological:  Negative for syncope and weakness.     Objective:      Physical Exam  Vitals reviewed.   Constitutional:       Appearance: Normal appearance.   HENT:      Head: Normocephalic and atraumatic.   Eyes:      Extraocular Movements: Extraocular movements intact.      Conjunctiva/sclera: Conjunctivae normal.      Pupils: Pupils are equal, round, and reactive to light.   Cardiovascular:      Rate and Rhythm: Normal rate and regular rhythm.      Heart sounds: Normal heart sounds.   Pulmonary:      Effort: Pulmonary effort is normal. No respiratory  distress.      Breath sounds: Normal breath sounds. No wheezing, rhonchi or rales.      Comments: Lungs are CTAB  Musculoskeletal:         General: Normal range of motion.      Cervical back: Normal range of motion.   Skin:     General: Skin is warm and dry.   Neurological:      General: No focal deficit present.      Mental Status: She is alert and oriented to person, place, and time.   Psychiatric:         Mood and Affect: Mood normal.         Behavior: Behavior normal.         EXAMINATION:  CT CHEST ABDOMEN PELVIS WITHOUT CONTRAST(XPD)     CLINICAL HISTORY:  pneumonia, hypoxia, abd pain, n/v;     TECHNIQUE:  Low dose axial images, sagittal and coronal reformations were obtained from the thoracic inlet to the pubic symphysis.  Oral contrast was not administered. The CT examination was performed using one or more of the following dose reduction techniques: Automated exposure control, adjustment of the mA and kV according to patient's size, use of acute or iterative reconstruction techniques.     COMPARISON:  Abdomen and pelvic CT 09/16/2019.  Chest radiograph earlier today.     FINDINGS:  Chest: Diffuse patchy opacities are seen involving the base of the right upper lobe, the right middle lobe, and the right lower lobe.  There are a few opacities in the left lung base.  No pneumothorax or pleural effusion.     Heart normal in size.  Coronary and thoracic aortic calcifications are seen.  Borderline mediastinal lymph nodes.     Abdomen and pelvis: Numerous calcifications of the liver and spleen.  No focal worrisome abnormality of the liver.  Gallbladder absent.  Adrenals and kidneys appear normal.  Pancreas unremarkable.     No pneumoperitoneum.  No ascites.  Diffuse colonic diverticulosis without evidence of diverticulitis.  No bowel obstruction or acute bowel abnormality.  Vascular calcifications.  Urinary bladder unremarkable.  No adnexal lesion.  Uterus absent.     Musculoskeletal: No acute fracture or osseous  lesion.     Impression:     Diffuse multifocal pneumonia of the right lung more so than the left.     No acute abnormality of the abdomen or pelvis.        Electronically signed by: Scooby Mcghee  Date:                                            07/05/2023  Time:                                           09:13           Exam Ended: 07/05/23 08:48 Last Resulted: 07/05/23 09:13           Assessment:       1. Community acquired bacterial pneumonia    2. Neuropathy of both feet    3. Type 2 diabetes mellitus with diabetic neuropathy, without long-term current use of insulin    4. Gastroesophageal reflux disease without esophagitis    5. Asymptomatic postsurgical menopause    6. Acquired hypothyroidism Adjusting regimen per notes   7. Essential hypertension    8. Recurrent major depressive disorder, in partial remission    9. Aortic atherosclerosis        Plan:       Continue current care  Wean from O2 as tolerated.

## 2023-07-19 ENCOUNTER — OUTPATIENT CASE MANAGEMENT (OUTPATIENT)
Dept: ADMINISTRATIVE | Facility: OTHER | Age: 68
End: 2023-07-19

## 2023-07-19 NOTE — LETTER
Rosalva Lopez  [address] [place] [address]      Dear Rosalva Lopez,     I work with [facility] Outpatient Care Management Department. We received a referral to call you to discuss your medical history. These services are free of charge and are offered to [facility] patients who have recently been discharged from any of our facilities or who have medical conditions that may require the skill of a nurse to assist with management.     I am a Registered Nurse who specializes in connecting patients with available medical and financial resources as well as addressing any educational needs that may be indicated.    I attempted to reach you by telephone, but I was unsuccessful. Please call our department so that we can go over some questions with you, regarding your health.    The Outpatient Care Management Department can be reached at [phone], from 8:00AM to 4:30 PM, on Monday thru Friday.     Additionally, [facility] also has a program where a nurse is available 24/7 to answer questions or provide medical advice, their number is [phone].      Thanks,        Paulina Oh RN  Outpatient Care Management  Phone #: [phone]

## 2023-07-19 NOTE — PROGRESS NOTES
07/19/23-Attempt assessment with patient for outpatient case management. No answer and unable to leave a message. Letter Mailed.  RN OPCM 1'st assessment attempt.

## 2023-07-20 ENCOUNTER — OUTPATIENT CASE MANAGEMENT (OUTPATIENT)
Dept: ADMINISTRATIVE | Facility: OTHER | Age: 68
End: 2023-07-20

## 2023-07-20 NOTE — PROGRESS NOTES
07/20/23-Attempt assessment with patient for outpatient case management. No answer and unable to leave a message. RN OPCM 2'nd assessment attempt.

## 2023-07-21 ENCOUNTER — OUTPATIENT CASE MANAGEMENT (OUTPATIENT)
Dept: ADMINISTRATIVE | Facility: OTHER | Age: 68
End: 2023-07-21

## 2023-07-21 NOTE — PROGRESS NOTES
07/21/23-Attempt assessment with patient for outpatient case management. She has a nurse calling her monthly that is meeting all of her health care needs. RN contact and OPCM  info sent thru the mail. OPCM Case Closure.

## 2023-07-31 ENCOUNTER — EXTERNAL CHRONIC CARE MANAGEMENT (OUTPATIENT)
Dept: FAMILY MEDICINE | Facility: CLINIC | Age: 68
End: 2023-07-31
Payer: MEDICARE

## 2023-07-31 PROCEDURE — G0511 CCM/BHI BY RHC/FQHC 20MIN MO: HCPCS | Mod: ,,, | Performed by: FAMILY MEDICINE

## 2023-07-31 PROCEDURE — G0511 PR CHRONIC CARE MGMT, RHC OR FQHC ONLY, 20 MINS OR MORE: ICD-10-PCS | Mod: ,,, | Performed by: FAMILY MEDICINE

## 2023-08-31 ENCOUNTER — EXTERNAL CHRONIC CARE MANAGEMENT (OUTPATIENT)
Dept: FAMILY MEDICINE | Facility: CLINIC | Age: 68
End: 2023-08-31
Payer: MEDICARE

## 2023-08-31 PROCEDURE — G0511 CCM/BHI BY RHC/FQHC 20MIN MO: HCPCS | Mod: ,,, | Performed by: FAMILY MEDICINE

## 2023-08-31 PROCEDURE — G0511 PR CHRONIC CARE MGMT, RHC OR FQHC ONLY, 20 MINS OR MORE: ICD-10-PCS | Mod: ,,, | Performed by: FAMILY MEDICINE

## 2023-09-07 ENCOUNTER — OFFICE VISIT (OUTPATIENT)
Dept: FAMILY MEDICINE | Facility: CLINIC | Age: 68
End: 2023-09-07
Payer: MEDICARE

## 2023-09-07 VITALS
WEIGHT: 172 LBS | HEART RATE: 72 BPM | BODY MASS INDEX: 31.65 KG/M2 | DIASTOLIC BLOOD PRESSURE: 78 MMHG | SYSTOLIC BLOOD PRESSURE: 122 MMHG | HEIGHT: 62 IN

## 2023-09-07 DIAGNOSIS — E11.40 TYPE 2 DIABETES MELLITUS WITH DIABETIC NEUROPATHY, WITHOUT LONG-TERM CURRENT USE OF INSULIN: Primary | Chronic | ICD-10-CM

## 2023-09-07 DIAGNOSIS — I10 ESSENTIAL HYPERTENSION: Chronic | ICD-10-CM

## 2023-09-07 PROCEDURE — 99214 PR OFFICE/OUTPT VISIT, EST, LEVL IV, 30-39 MIN: ICD-10-PCS | Mod: ,,, | Performed by: FAMILY MEDICINE

## 2023-09-07 PROCEDURE — 99214 OFFICE O/P EST MOD 30 MIN: CPT | Mod: ,,, | Performed by: FAMILY MEDICINE

## 2023-09-07 NOTE — PROGRESS NOTES
Robert Gallegos MD   [facility]  MEDICAL GROUP [facility]  [address] [place] [id number]  [phone]      PATIENT NAME: Rosalva Lopez  : 1955  DATE: 23  MRN: [id number]      Billing Provider: Robert Gallegos MD  Level of Service: ID OFFICE/OUTPT VISIT, EST, LEVL IV, 30-39 MIN  Patient PCP Information       Provider PCP Type    Robert Gallegos MD General            Reason for Visit / Chief Complaint: Health Maintenance       Update PCP  Update Chief Complaint         History of Present Illness / Problem Focused Workflow     Rosalva Lopez presents to the clinic with Health Maintenance       Non smoker.  Is compliant with medications.  Is UTD on colonoscopy.  Report from 02/03/15 showed diverticulosis.  Has mammogram scheduled in 2023.  Last done  was benign.  Is a diabetic and needs eye exam.  Is compliant with medication for HTN and BP well controlled.  Has been working on diet and has lost weight. Is using ozempic and needs refill.  Not having any adverse side effects.      Review of Systems     Review of Systems   Constitutional:  Negative for activity change, chills and fever.   HENT:  Negative for sore throat.    Eyes:  Negative for pain.   Respiratory:  Negative for cough, chest tightness and shortness of breath.    Cardiovascular:  Negative for chest pain and palpitations.   Gastrointestinal:  Negative for abdominal pain.   Neurological:  Negative for dizziness, syncope and weakness.   Psychiatric/Behavioral:  Negative for confusion.         Medical / Social / Family History     Past Medical History:   Diagnosis Date    Depressive disorder     Diabetes     GERD (gastroesophageal reflux disease)     Hypothyroidism     Trigeminal neuralgia of left side of face        Past Surgical History:   Procedure Laterality Date    BREAST BIOPSY      CARPAL TUNNEL RELEASE Left     CHOLECYSTECTOMY      HYSTERECTOMY      INGUINAL HERNIA  REPAIR      TONSILLECTOMY      TYMPANOPLASTY         Social History    reports that she has never smoked. She has never been exposed to tobacco smoke. She has never used smokeless tobacco. She reports that she does not drink alcohol and does not use drugs.   Social History     Tobacco Use    Smoking status: Never     Passive exposure: Never    Smokeless tobacco: Never   Substance Use Topics    Alcohol use: Never    Drug use: Never       Family History  Family History   Problem Relation Age of Onset    Heart disease Mother     Hypertension Father     Diabetes Brother     Breast cancer Maternal Grandmother        Medications and Allergies     Medications  Outpatient Medications Marked as Taking for the 9/7/23 encounter (Office Visit) with Robert Gallegos MD   Medication Sig Dispense Refill    acetaminophen (TYLENOL) 325 MG tablet Take 2 tablets (650 mg total) by mouth every 8 (eight) hours as needed.  0    albuterol (VENTOLIN HFA) 90 mcg/actuation inhaler Inhale 2 puffs into the lungs every 6 (six) hours as needed for Wheezing or Shortness of Breath. Rescue 8 g 0    amLODIPine (NORVASC) 10 MG tablet Take 1 tablet (10 mg total) by mouth once daily. 90 tablet 1    aspirin (ECOTRIN) 81 MG EC tablet Take 81 mg by mouth once daily.      diclofenac sodium (VOLTAREN) 1 % Gel Apply 2 g topically 4 (four) times daily. 100 g 0    ergocalciferol (ERGOCALCIFEROL) 50,000 unit Cap Take 5,000 Units by mouth once daily.      estradioL (ESTRACE) 1 MG tablet Take 1 tablet (1 mg total) by mouth once daily. 90 tablet 1    gabapentin (NEURONTIN) 100 MG capsule Take two capsules by mouth twice daily. 360 capsule 1    glipiZIDE (GLUCOTROL) 5 MG tablet Take 1 tablet (5 mg total) by mouth 2 (two) times daily with meals. 180 tablet 2    levothyroxine (SYNTHROID) 50 MCG tablet Take 1 tablet (50 mcg total) by mouth before breakfast. 90 tablet 1    LORazepam (ATIVAN) 0.5 MG tablet Take 1 tablet (0.5 mg total) by mouth every 6 (six) hours as  needed for Anxiety. 14 tablet 2    pantoprazole (PROTONIX) 40 MG tablet Take 1 tablet (40 mg total) by mouth once daily. 90 tablet 1    pravastatin (PRAVACHOL) 40 MG tablet Take 1 tablet (40 mg total) by mouth every evening. 90 tablet 2    traZODone (DESYREL) 50 MG tablet Take 1 tablet (50 mg total) by mouth once daily. 90 tablet 1    valproic acid (DEPAKENE) 250 mg capsule Take 1 capsule (250 mg total) by mouth 2 (two) times daily. 180 capsule 2    [DISCONTINUED] semaglutide (OZEMPIC) 0.25 mg or 0.5 mg(2 mg/1.5 mL) pen injector Inject 0.25 mg into the skin every 7 days. 1 pen 5       Allergies  Review of patient's allergies indicates:   Allergen Reactions    Fluzone 4410-1891 tri-whole     Sulfa (sulfonamide antibiotics)     Tamiflu [oseltamivir]        Physical Examination     Vitals:    09/07/23 1055   BP: 122/78   Pulse: 72     Physical Exam  Vitals reviewed.   Constitutional:       Appearance: Normal appearance.   HENT:      Head: Normocephalic and atraumatic.   Eyes:      Extraocular Movements: Extraocular movements intact.      Conjunctiva/sclera: Conjunctivae normal.      Pupils: Pupils are equal, round, and reactive to light.   Cardiovascular:      Rate and Rhythm: Normal rate and regular rhythm.      Heart sounds: Normal heart sounds.   Pulmonary:      Effort: Pulmonary effort is normal.      Breath sounds: Normal breath sounds.   Musculoskeletal:         General: Normal range of motion.      Cervical back: Normal range of motion.   Skin:     General: Skin is warm and dry.   Neurological:      General: No focal deficit present.      Mental Status: She is alert and oriented to person, place, and time.   Psychiatric:         Mood and Affect: Mood normal.         Behavior: Behavior normal.          Assessment and Plan (including Health Maintenance)     Narrative & Impression  Result:   Mammo Digital Screening Bilat     History:  Patient is 67 y.o. and is seen for a screening mammogram.     Positive family  history of breast cancer.     Films Compared:  Compared to: 07/12/2021 Mammo Digital Screening Bilat (No Change)     Findings:  The breasts are heterogeneously dense, which may obscure small masses. There is no evidence of suspicious masses, calcifications, or other abnormal findings.     There are some occasional scattered benign-appearing calcifications without change.     Impression:  Bilateral  There is no mammographic evidence of malignancy.     BI-RADS Category:   Overall: 2 - Benign     Recommendation:  Routine screening mammogram in 1 year is recommended.        Your estimated lifetime risk of breast cancer (to age 85) based on Tyrer-Cuzick risk assessment model is Tyrer-Cuzick: 10.33 %. According to the American Cancer Society, patients with a lifetime breast cancer risk of 20% or higher might benefit from supplemental screening tests.                 Exam Ended: 07/18/22 09:26 Last Resulted: 07/18/22 12:59            Problem List  Smart Sets  Document Outside HM   :    Plan: we are getting scheduled for eye exam with Dr. Sanchez in [place].    Recommend to eat a well balanced, healthy diet low in sodium, saturated/trans fats, refined carbs and processed foods.  Get regular exercise at least 30 minutes 4-5 x week.  Maintain a healthy weight.  Stay up to date with immunizations and preventive screenings.  Take all medications as prescribed and attend regular follow up visits.  Get annual wellness exam.             Health Maintenance Due   Topic Date Due    Diabetes Urine Screening  Never done    TETANUS VACCINE  Never done    Shingles Vaccine (1 of 2) Never done    Eye Exam  12/04/2021    Mammogram  07/18/2023    Lipid Panel  09/15/2023       Problem List Items Addressed This Visit          Cardiac/Vascular    Essential hypertension       Endocrine    Type 2 diabetes mellitus with diabetic neuropathy, without long-term current use of insulin - Primary (Chronic)    Relevant Medications    semaglutide  (OZEMPIC) 0.25 mg or 0.5 mg(2 mg/1.5 mL) pen injector       Health Maintenance Topics with due status: Not Due       Topic Last Completion Date    Colorectal Cancer Screening 02/03/2015    DEXA Scan 07/12/2021    Foot Exam 03/31/2023    Hemoglobin A1c 07/05/2023    High Dose Statin 07/18/2023       Future Appointments   Date Time Provider Department Center   10/13/2023  2:00 PM [facility] NURSE, [facility]   12/18/2023  2:45 PM [facility]            Signature:  Robert Gallegos MD  [facility]  MEDICAL GROUP [facility] - FAMILY MEDICINE  [address] [place] [id number]  [phone]    Date of encounter: 9/7/23

## 2023-09-13 RX ORDER — SEMAGLUTIDE 1.34 MG/ML
0.25 INJECTION, SOLUTION SUBCUTANEOUS
Qty: 1 EACH | Refills: 0 | Status: SHIPPED | OUTPATIENT
Start: 2023-09-13 | End: 2024-09-12

## 2023-09-28 ENCOUNTER — OFFICE VISIT (OUTPATIENT)
Dept: FAMILY MEDICINE | Facility: CLINIC | Age: 68
End: 2023-09-28
Payer: MEDICARE

## 2023-09-28 VITALS
WEIGHT: 169.69 LBS | SYSTOLIC BLOOD PRESSURE: 131 MMHG | HEART RATE: 68 BPM | BODY MASS INDEX: 31.23 KG/M2 | DIASTOLIC BLOOD PRESSURE: 75 MMHG | HEIGHT: 62 IN

## 2023-09-28 DIAGNOSIS — E11.40 TYPE 2 DIABETES MELLITUS WITH DIABETIC NEUROPATHY, WITHOUT LONG-TERM CURRENT USE OF INSULIN: Chronic | ICD-10-CM

## 2023-09-28 DIAGNOSIS — I10 ESSENTIAL HYPERTENSION: Chronic | ICD-10-CM

## 2023-09-28 DIAGNOSIS — K21.9 GASTROESOPHAGEAL REFLUX DISEASE WITHOUT ESOPHAGITIS: Primary | Chronic | ICD-10-CM

## 2023-09-28 DIAGNOSIS — E78.2 MIXED HYPERLIPIDEMIA: Chronic | ICD-10-CM

## 2023-09-28 PROCEDURE — 99214 PR OFFICE/OUTPT VISIT, EST, LEVL IV, 30-39 MIN: ICD-10-PCS | Mod: ,,, | Performed by: FAMILY MEDICINE

## 2023-09-28 PROCEDURE — 99214 OFFICE O/P EST MOD 30 MIN: CPT | Mod: ,,, | Performed by: FAMILY MEDICINE

## 2023-09-28 RX ORDER — FAMOTIDINE 40 MG/1
40 TABLET, FILM COATED ORAL DAILY
Qty: 90 TABLET | Refills: 1 | Status: SHIPPED | OUTPATIENT
Start: 2023-09-28 | End: 2024-03-27 | Stop reason: SDUPTHER

## 2023-09-28 NOTE — PROGRESS NOTES
Robert Gallegos MD   [facility]  MEDICAL GROUP [address] [address]  [phone]      PATIENT NAME: Rosalva Lopez  : 1955  DATE: 23  MRN: [id number]      Billing Provider: Robert Gallegos MD  Level of Service:   Patient PCP Information       Provider PCP Type    Robert Gallegos MD General            Reason for Visit / Chief Complaint: Gastroesophageal Reflux       Update PCP  Update Chief Complaint         History of Present Illness / Problem Focused Workflow     Rosalva Lopez presents to the clinic with Gastroesophageal Reflux       Has been taking protonix for GERD but not working as well recently.  BP is doing well.  Reports that glucose is also well controlled.  Was 120 this morning.      Review of Systems     Review of Systems   Constitutional:  Negative for activity change, chills and fever.   HENT:  Negative for sore throat.    Eyes:  Negative for pain.   Respiratory:  Negative for cough, chest tightness and shortness of breath.    Cardiovascular:  Negative for chest pain and palpitations.   Gastrointestinal:  Positive for reflux. Negative for abdominal pain.   Neurological:  Negative for dizziness, syncope and weakness.   Psychiatric/Behavioral:  Negative for confusion.       Medical / Social / Family History     Past Medical History:   Diagnosis Date    Depressive disorder     Diabetes     GERD (gastroesophageal reflux disease)     Hypothyroidism     Trigeminal neuralgia of left side of face        Past Surgical History:   Procedure Laterality Date    BREAST BIOPSY      CARPAL TUNNEL RELEASE Left     CHOLECYSTECTOMY      HYSTERECTOMY      INGUINAL HERNIA REPAIR      TONSILLECTOMY      TYMPANOPLASTY         Social History    reports that she has never smoked. She has never been exposed to tobacco smoke. She has never used smokeless tobacco. She reports that she does not drink alcohol and does not use drugs.    Social History     Tobacco Use    Smoking status: Never     Passive exposure: Never    Smokeless tobacco: Never   Substance Use Topics    Alcohol use: Never    Drug use: Never       Family History  Family History   Problem Relation Age of Onset    Heart disease Mother     Hypertension Father     Diabetes Brother     Breast cancer Maternal Grandmother        Medications and Allergies     Medications  No outpatient medications have been marked as taking for the 9/28/23 encounter (Office Visit) with Robert Gallegos MD.       Allergies  Review of patient's allergies indicates:   Allergen Reactions    Fluzone [id number] tri-whole     Sulfa (sulfonamide antibiotics)     Tamiflu [oseltamivir]        Physical Examination     Vitals:    09/28/23 1030   BP: 131/75   Pulse: 68     Physical Exam  Vitals reviewed.   Constitutional:       Appearance: Normal appearance.   HENT:      Head: Normocephalic and atraumatic.   Eyes:      Extraocular Movements: Extraocular movements intact.      Conjunctiva/sclera: Conjunctivae normal.      Pupils: Pupils are equal, round, and reactive to light.   Cardiovascular:      Rate and Rhythm: Normal rate and regular rhythm.      Heart sounds: Normal heart sounds.   Pulmonary:      Effort: Pulmonary effort is normal.      Breath sounds: Normal breath sounds.   Musculoskeletal:         General: Normal range of motion.      Cervical back: Normal range of motion.   Skin:     General: Skin is warm and dry.   Neurological:      General: No focal deficit present.      Mental Status: She is alert and oriented to person, place, and time.   Psychiatric:         Mood and Affect: Mood normal.         Behavior: Behavior normal.        Assessment and Plan (including Health Maintenance)      Problem List  Smart Sets  Document Outside HM   :    Plan: will add pepcid to protonix.  She says that she wants to wait until AW[facility] to get labs done.        Health Maintenance Due   Topic Date Due    Diabetes Urine  Screening  Never done    TETANUS VACCINE  Never done    Shingles Vaccine (1 of 2) Never done    Eye Exam  12/04/2021    Mammogram  07/18/2023    Lipid Panel  09/15/2023       Problem List Items Addressed This Visit    None      Health Maintenance Topics with due status: Not Due       Topic Last Completion Date    Colorectal Cancer Screening 02/03/2015    DEXA Scan 07/12/2021    Foot Exam 03/31/2023    Hemoglobin A1c 07/05/2023    High Dose Statin 07/18/2023       Future Appointments   Date Time Provider Department Center   10/13/2023  2:00 PM [facility] NURSE, [facility]   12/18/2023  2:45 PM [facility]            Signature:  MD TONY Murcia [facility]  [facility] [place] - [facility]ILY [facility]IC[facility]  [address] [place] [address] [place] [address]  [phone]    Date of encounter: 9/28/23

## 2023-09-30 ENCOUNTER — EXTERNAL CHRONIC CARE MANAGEMENT (OUTPATIENT)
Dept: FAMILY MEDICINE | Facility: CLINIC | Age: 68
End: 2023-09-30
Payer: MEDICARE

## 2023-09-30 PROCEDURE — G0511 CCM/BHI BY RHC/FQHC 20MIN MO: HCPCS | Mod: ,,, | Performed by: FAMILY MEDICINE

## 2023-09-30 PROCEDURE — G0511 PR CHRONIC CARE MGMT, RHC OR FQHC ONLY, 20 MINS OR MORE: ICD-10-PCS | Mod: ,,, | Performed by: FAMILY MEDICINE

## 2023-10-09 PROBLEM — J15.9 COMMUNITY ACQUIRED BACTERIAL PNEUMONIA: Status: RESOLVED | Noted: 2023-07-05 | Resolved: 2023-10-09

## 2023-10-13 ENCOUNTER — OFFICE VISIT (OUTPATIENT)
Dept: FAMILY MEDICINE | Facility: CLINIC | Age: 68
End: 2023-10-13
Payer: MEDICARE

## 2023-10-13 VITALS
SYSTOLIC BLOOD PRESSURE: 122 MMHG | WEIGHT: 169 LBS | BODY MASS INDEX: 31.1 KG/M2 | HEART RATE: 73 BPM | RESPIRATION RATE: 13 BRPM | HEIGHT: 62 IN | OXYGEN SATURATION: 96 % | DIASTOLIC BLOOD PRESSURE: 74 MMHG

## 2023-10-13 DIAGNOSIS — E66.09 CLASS 1 OBESITY DUE TO EXCESS CALORIES WITH SERIOUS COMORBIDITY AND BODY MASS INDEX (BMI) OF 30.0 TO 30.9 IN ADULT: ICD-10-CM

## 2023-10-13 DIAGNOSIS — N18.31 STAGE 3A CHRONIC KIDNEY DISEASE: ICD-10-CM

## 2023-10-13 DIAGNOSIS — E03.9 ACQUIRED HYPOTHYROIDISM: Chronic | ICD-10-CM

## 2023-10-13 DIAGNOSIS — I10 ESSENTIAL HYPERTENSION: ICD-10-CM

## 2023-10-13 DIAGNOSIS — E11.40 TYPE 2 DIABETES MELLITUS WITH DIABETIC NEUROPATHY, WITHOUT LONG-TERM CURRENT USE OF INSULIN: Chronic | ICD-10-CM

## 2023-10-13 DIAGNOSIS — Z13.31 POSITIVE DEPRESSION SCREENING: ICD-10-CM

## 2023-10-13 DIAGNOSIS — Z99.81 DEPENDENCE ON SUPPLEMENTAL OXYGEN: ICD-10-CM

## 2023-10-13 DIAGNOSIS — Z00.00 ENCOUNTER FOR SUBSEQUENT ANNUAL WELLNESS VISIT (AWV) IN MEDICARE PATIENT: Primary | ICD-10-CM

## 2023-10-13 DIAGNOSIS — E78.2 MIXED HYPERLIPIDEMIA: Chronic | ICD-10-CM

## 2023-10-13 DIAGNOSIS — F33.41 RECURRENT MAJOR DEPRESSIVE DISORDER, IN PARTIAL REMISSION: ICD-10-CM

## 2023-10-13 PROCEDURE — 80061 LIPID PANEL: ICD-10-PCS | Mod: ,,, | Performed by: CLINICAL MEDICAL LABORATORY

## 2023-10-13 PROCEDURE — 82043 MICROALBUMIN / CREATININE RATIO URINE: ICD-10-PCS | Mod: ,,, | Performed by: CLINICAL MEDICAL LABORATORY

## 2023-10-13 PROCEDURE — 82043 UR ALBUMIN QUANTITATIVE: CPT | Mod: ,,, | Performed by: CLINICAL MEDICAL LABORATORY

## 2023-10-13 PROCEDURE — 82570 ASSAY OF URINE CREATININE: CPT | Mod: ,,, | Performed by: CLINICAL MEDICAL LABORATORY

## 2023-10-13 PROCEDURE — G0439 PR MEDICARE ANNUAL WELLNESS SUBSEQUENT VISIT: ICD-10-PCS | Mod: ,,, | Performed by: FAMILY MEDICINE

## 2023-10-13 PROCEDURE — G0439 PPPS, SUBSEQ VISIT: HCPCS | Mod: ,,, | Performed by: FAMILY MEDICINE

## 2023-10-13 PROCEDURE — 82570 MICROALBUMIN / CREATININE RATIO URINE: ICD-10-PCS | Mod: ,,, | Performed by: CLINICAL MEDICAL LABORATORY

## 2023-10-13 PROCEDURE — 80061 LIPID PANEL: CPT | Mod: ,,, | Performed by: CLINICAL MEDICAL LABORATORY

## 2023-10-13 NOTE — PROGRESS NOTES
I have reviewed the patient's positive depression score. Recommendation based on score is frequent follow up.  Patient does not want to start on any new/additional medications.

## 2023-10-13 NOTE — PROGRESS NOTES
"  Rosalva Lopez presented for a  Medicare AWV and comprehensive Health Risk Assessment today. The following components were reviewed and updated:    Medical history  Family History  Social history  Allergies and Current Medications  Health Risk Assessment  Health Maintenance  Care Team         ** See Completed Assessments for Annual Wellness Visit within the encounter summary.**         The following assessments were completed:  Living Situation  CAGE  Depression Screening  Timed Get Up and Go  Whisper Test  Cognitive Function Screening  Nutrition Screening  ADL Screening  PAQ Screening          Vitals:    10/13/23 1331   BP: 122/74   BP Location: Left arm   Patient Position: Sitting   Pulse: 73   Resp: 13   SpO2: 96%   Weight: 76.7 kg (169 lb)   Height: 5' 2" (1.575 m)     Body mass index is 30.91 kg/m².  Physical Exam  Vitals reviewed.   Constitutional:       Appearance: Normal appearance.   HENT:      Head: Normocephalic and atraumatic.   Eyes:      Extraocular Movements: Extraocular movements intact.      Conjunctiva/sclera: Conjunctivae normal.      Pupils: Pupils are equal, round, and reactive to light.   Cardiovascular:      Rate and Rhythm: Normal rate and regular rhythm.      Heart sounds: Normal heart sounds.   Pulmonary:      Effort: Pulmonary effort is normal.      Breath sounds: Normal breath sounds.   Musculoskeletal:         General: Normal range of motion.      Cervical back: Normal range of motion.   Skin:     General: Skin is warm and dry.   Neurological:      General: No focal deficit present.      Mental Status: She is alert and oriented to person, place, and time.   Psychiatric:         Mood and Affect: Mood normal.         Behavior: Behavior normal.             Diagnoses and health risks identified today and associated recommendations/orders:    1. Encounter for subsequent annual wellness visit (AWV) in Medicare patient  2024 AWV scheduled    2. Type 2 diabetes mellitus with diabetic " neuropathy, without long-term current use of insulin  Continue current care and diabetic diet    3. Essential hypertension  Continue current care and low sodium diet    4. Stage 3a chronic kidney disease  Will moniotr creatinine    5. Acquired hypothyroidism  Continue thyroid medication    6. Mixed hyperlipidemia  Low fat diet  and statin med    7. Recurrent major depressive disorder, in partial remission  Frequent follow up    8. Class 1 obesity due to excess calories with serious comorbidity and body mass index (BMI) of 30.0 to 30.9 in adult  Has lost 20 lbs recently and continuing effort to lose weight    9. Dependence on supplemental oxygen  Is improving and will wean as tolerated      Declines all vaccines due to past history of allergy   Will obtain lipid panel and urine microalbumin today  Has appointment scheduled for 11/18/2023 for diabetic eye exam with Dr. Sanchez  Scheduled for mammogram 12/18/2023      Provided Rosalva with a 5-10 year written screening schedule and personal prevention plan. Recommendations were developed using the USPSTF age appropriate recommendations. Education, counseling, and referrals were provided as needed. After Visit Summary printed and given to patient which includes a list of additional screenings\tests needed.    Follow up for 1 year for Annual Wellness Visit.    Robert Gallegos MD      I offered to discuss advanced care planning, including how to pick a person who would make decisions for you if you were unable to make them for yourself, called a health care power of , and what kind of decisions you might make such as use of life sustaining treatments such as ventilators and tube feeding when faced with a life limiting illness recorded on a living will that they will need to know. (How you want to be cared for as you near the end of your natural life)     X  Patient has advanced directives written and agrees to provide copies to the institution.

## 2023-10-13 NOTE — PATIENT INSTRUCTIONS
Counseling and Referral of Other Preventative  (Italic type indicates deductible and co-insurance are waived)    Patient Name: Rosalva Lopez  Today's Date: 10/13/2023    Health Maintenance       Date Due Completion Date    Diabetes Urine Screening Never done ---    Pneumococcal Vaccines (Age 65+) (1 - PCV) Never done ---    TETANUS VACCINE Never done ---    Shingles Vaccine (1 of 2) Never done ---    Eye Exam 12/04/2021 12/4/2020    Mammogram 07/18/2023 7/18/2022    COVID-19 Vaccine (3 - 2023-24 season) 09/01/2023 10/19/2021    Lipid Panel 09/15/2023 9/15/2022    Hemoglobin A1c 01/05/2024 7/5/2023    Foot Exam 03/31/2024 3/31/2023    DEXA Scan 07/12/2024 7/12/2021    High Dose Statin 10/13/2024 10/13/2023    Colorectal Cancer Screening 02/03/2025 2/3/2015        No orders of the defined types were placed in this encounter.    The following information is provided to all patients.  This information is to help you find resources for any of the problems found today that may be affecting your health:                Living healthy guide: www.[facility].[place].gov      Understanding Diabetes: www.diabetes.org      Eating healthy: www.cdc.gov/healthyweight      CDC home safety checklist: www.cdc.gov/steadi/patient.html      Agency on Aging: www.goea.[place].[facility]      Alcoholics anonymous (AA): www.aa.org      Physical Activity: www.juancarlos.nih.gov/va7mgvv      Tobacco use: www.quitwithusla.org

## 2023-10-14 LAB
CHOLEST SERPL-MCNC: 166 MG/DL (ref 0–200)
CHOLEST/HDLC SERPL: 3.9 {RATIO}
CREAT UR-MCNC: 97 MG/DL (ref 28–219)
HDLC SERPL-MCNC: 43 MG/DL (ref 40–60)
LDLC SERPL CALC-MCNC: 65 MG/DL
LDLC/HDLC SERPL: 1.5 {RATIO}
MICROALBUMIN UR-MCNC: <0.5 MG/DL (ref 0–2.8)
MICROALBUMIN/CREAT RATIO PNL UR: <5.2 MG/G (ref 0–30)
NONHDLC SERPL-MCNC: 123 MG/DL
TRIGL SERPL-MCNC: 289 MG/DL (ref 35–150)
VLDLC SERPL-MCNC: 58 MG/DL

## 2023-10-25 PROCEDURE — G0511 CCM/BHI BY RHC/FQHC 20MIN MO: HCPCS | Mod: ,,, | Performed by: FAMILY MEDICINE

## 2023-10-25 PROCEDURE — G0511 PR CHRONIC CARE MGMT, RHC OR FQHC ONLY, 20 MINS OR MORE: ICD-10-PCS | Mod: ,,, | Performed by: FAMILY MEDICINE

## 2023-10-31 ENCOUNTER — OFFICE VISIT (OUTPATIENT)
Dept: FAMILY MEDICINE | Facility: CLINIC | Age: 68
End: 2023-10-31
Payer: MEDICARE

## 2023-10-31 ENCOUNTER — EXTERNAL CHRONIC CARE MANAGEMENT (OUTPATIENT)
Dept: FAMILY MEDICINE | Facility: CLINIC | Age: 68
End: 2023-10-31
Payer: MEDICARE

## 2023-10-31 VITALS
HEIGHT: 62 IN | BODY MASS INDEX: 31.1 KG/M2 | WEIGHT: 169 LBS | DIASTOLIC BLOOD PRESSURE: 67 MMHG | SYSTOLIC BLOOD PRESSURE: 105 MMHG | HEART RATE: 80 BPM

## 2023-10-31 DIAGNOSIS — E11.40 TYPE 2 DIABETES MELLITUS WITH DIABETIC NEUROPATHY, WITHOUT LONG-TERM CURRENT USE OF INSULIN: Chronic | ICD-10-CM

## 2023-10-31 DIAGNOSIS — G89.29 CHRONIC MIDLINE LOW BACK PAIN WITHOUT SCIATICA: Primary | Chronic | ICD-10-CM

## 2023-10-31 DIAGNOSIS — I10 ESSENTIAL HYPERTENSION: Chronic | ICD-10-CM

## 2023-10-31 DIAGNOSIS — M54.50 CHRONIC MIDLINE LOW BACK PAIN WITHOUT SCIATICA: Primary | Chronic | ICD-10-CM

## 2023-10-31 PROCEDURE — 96372 THER/PROPH/DIAG INJ SC/IM: CPT | Mod: ,,, | Performed by: FAMILY MEDICINE

## 2023-10-31 PROCEDURE — 96372 PR INJECTION,THERAP/PROPH/DIAG2ST, IM OR SUBCUT: ICD-10-PCS | Mod: ,,, | Performed by: FAMILY MEDICINE

## 2023-10-31 PROCEDURE — 99214 OFFICE O/P EST MOD 30 MIN: CPT | Mod: ,,, | Performed by: FAMILY MEDICINE

## 2023-10-31 PROCEDURE — 99214 PR OFFICE/OUTPT VISIT, EST, LEVL IV, 30-39 MIN: ICD-10-PCS | Mod: ,,, | Performed by: FAMILY MEDICINE

## 2023-10-31 RX ORDER — KETOROLAC TROMETHAMINE 30 MG/ML
30 INJECTION, SOLUTION INTRAMUSCULAR; INTRAVENOUS
Status: COMPLETED | OUTPATIENT
Start: 2023-10-31 | End: 2023-10-31

## 2023-10-31 RX ADMIN — KETOROLAC TROMETHAMINE 30 MG: 30 INJECTION, SOLUTION INTRAMUSCULAR; INTRAVENOUS at 04:10

## 2023-10-31 NOTE — PROGRESS NOTES
"   Robert Gallegos MD   [facility]  [facility]  [address] [place] [id number]  [phone]      PATIENT NAME: Rosalva Lopez  : 1955  DATE: 10/31/23  MRN: [id number]      Billing Provider: Robert Gallegos MD  Level of Service:   Patient PCP Information       Provider PCP Type    Robert Gallegos MD General            Reason for Visit / Chief Complaint: Back Pain (Started yesterday after cleaning house)       Update PCP  Update Chief Complaint         History of Present Illness / Problem Focused Workflow     Rosalva Lopez presents to the clinic with Back Pain (Started yesterday after cleaning house)       Acute back pain, worse on right, started after cleaning house.  Says that she is having difficulty getting up from seated position.  Reports that she was "stuck on the floor" for a time yesterday because she was unable to get up by herself.  She does have ongoing issues with DDD and does see a chiropractor on a fairly regular basis.  She is requesting a shot today.  She does have HTN and DM.      Back Pain  Pertinent negatives include no numbness or weakness.       Review of Systems     Review of Systems   Musculoskeletal:  Positive for arthralgias, back pain and myalgias.   Neurological:  Negative for weakness and numbness.        Medical / Social / Family History     Past Medical History:   Diagnosis Date    Depressive disorder     Diabetes     GERD (gastroesophageal reflux disease)     Hypothyroidism     Trigeminal neuralgia of left side of face        Past Surgical History:   Procedure Laterality Date    BREAST BIOPSY      CARPAL TUNNEL RELEASE Left     CHOLECYSTECTOMY      HYSTERECTOMY      INGUINAL HERNIA REPAIR      TONSILLECTOMY      TYMPANOPLASTY         Social History    reports that she has never smoked. She has never been exposed to tobacco smoke. She has never used smokeless tobacco. She reports that she does not drink " alcohol and does not use drugs.   Social History     Tobacco Use    Smoking status: Never     Passive exposure: Never    Smokeless tobacco: Never   Substance Use Topics    Alcohol use: Never    Drug use: Never       Family History  Family History   Problem Relation Age of Onset    Heart disease Mother     Hypertension Father     Diabetes Brother     Breast cancer Maternal Grandmother        Medications and Allergies     Medications  No outpatient medications have been marked as taking for the 10/31/23 encounter (Office Visit) with Robert Gallegos MD.     Current Facility-Administered Medications for the 10/31/23 encounter (Office Visit) with Robert Gallegos MD   Medication Dose Route Frequency Provider Last Rate Last Admin    [COMPLETED] ketorolac injection 30 mg  30 mg Intramuscular 1 time in Clinic/HOD Robert Gallegos MD   30 mg at 10/31/23 1604       Allergies  Review of patient's allergies indicates:   Allergen Reactions    Fluzone [id number] tri-whole     Sulfa (sulfonamide antibiotics)     Tamiflu [oseltamivir]        Physical Examination     Vitals:    10/31/23 1556   BP: 105/67   Pulse: 80     Physical Exam  Vitals reviewed.   Constitutional:       Appearance: Normal appearance.   HENT:      Head: Normocephalic and atraumatic.   Eyes:      Extraocular Movements: Extraocular movements intact.      Conjunctiva/sclera: Conjunctivae normal.      Pupils: Pupils are equal, round, and reactive to light.   Cardiovascular:      Rate and Rhythm: Normal rate and regular rhythm.      Heart sounds: Normal heart sounds.   Pulmonary:      Effort: Pulmonary effort is normal.      Breath sounds: Normal breath sounds.   Musculoskeletal:         General: Tenderness present. Normal range of motion.      Cervical back: Normal range of motion.   Skin:     General: Skin is warm and dry.   Neurological:      General: No focal deficit present.      Mental Status: She is alert and oriented to person, place, and time.    Psychiatric:         Mood and Affect: Mood normal.         Behavior: Behavior normal.          Office Visit on 10/13/2023   Component Date Value Ref Range Status    Triglycerides 10/13/2023 289 (H)  35 - 150 mg/dL Final      Normal:  <150 mg/dL  Borderline High: 150-199 mg/dL  High:   200-499 mg/dL  Very High:  >=500    Cholesterol 10/13/2023 166  0 - 200 mg/dL Final      <200 mg/dL:  Desirable  200-240 mg/dL: Borderline High  >240 mg/dL:  High    HDL Cholesterol 10/13/2023 43  40 - 60 mg/dL Final      <40 mg/dL: Low HDL  40-60 mg/dL: Normal  >60 mg/dL: Desirable    Cholesterol/HDL Ratio (Risk Factor) 10/13/2023 3.9   Final    Non-HDL 10/13/2023 123  mg/dL Final    LDL Calculated 10/13/2023 65  mg/dL Final    LDL/HDL 10/13/2023 1.5   Final    VLDL 10/13/2023 58  mg/dL Final    Creatinine, Urine 10/13/2023 97  28 - 219 mg/dL Final    Microalbumin 10/13/2023 <0.5  0.0 - 2.8 mg/dL Final    Microalbumin/Creatinine Ratio 10/13/2023 <5.2  0.0 - 30.0 mg/g Final         Assessment and Plan (including Health Maintenance)      Problem List  Smart Sets  Document Outside HM   :    Plan: Toradol IM in clinic.  I reviewed her recent lab results with her.          Health Maintenance Due   Topic Date Due    Pneumococcal Vaccines (Age 65+) (1 - PCV) Never done    TETANUS VACCINE  Never done    Shingles Vaccine (1 of 2) Never done    RSV Vaccine (Age 60+) (1 - 1-dose 60+ series) Never done    Eye Exam  12/04/2021    Mammogram  07/18/2023    COVID-19 Vaccine (3 - 2023-24 season) 09/01/2023       Problem List Items Addressed This Visit          Cardiac/Vascular    Essential hypertension - Primary       Endocrine    Type 2 diabetes mellitus with diabetic neuropathy, without long-term current use of insulin (Chronic)       Orthopedic    Chronic low back pain (Chronic)- acute exacerbation    Relevant Medications    ketorolac injection 30 mg (Completed)       Health Maintenance Topics with due status: Not Due       Topic Last Completion  Date    Colorectal Cancer Screening 02/03/2015    DEXA Scan 07/12/2021    Foot Exam 03/31/2023    Hemoglobin A1c 07/05/2023    High Dose Statin 10/13/2023    Diabetes Urine Screening 10/13/2023    Lipid Panel 10/13/2023       Future Appointments   Date Time Provider Department Center   12/18/2023  2:45 PM [facility]            Signature:  MD TONY Murcia [facility]  [facility]ICAL [facility]  [address] [place] [id number]  [phone]    Date of encounter: 10/31/23

## 2023-11-30 ENCOUNTER — EXTERNAL CHRONIC CARE MANAGEMENT (OUTPATIENT)
Dept: FAMILY MEDICINE | Facility: CLINIC | Age: 68
End: 2023-11-30
Payer: MEDICARE

## 2023-11-30 PROCEDURE — G0511 CCM/BHI BY RHC/FQHC 20MIN MO: HCPCS | Mod: ,,, | Performed by: FAMILY MEDICINE

## 2023-11-30 PROCEDURE — G0511 PR CHRONIC CARE MGMT, RHC OR FQHC ONLY, 20 MINS OR MORE: ICD-10-PCS | Mod: ,,, | Performed by: FAMILY MEDICINE

## 2023-12-14 DIAGNOSIS — E11.40 TYPE 2 DIABETES MELLITUS WITH DIABETIC NEUROPATHY, WITHOUT LONG-TERM CURRENT USE OF INSULIN: Primary | ICD-10-CM

## 2023-12-18 ENCOUNTER — HOSPITAL ENCOUNTER (OUTPATIENT)
Dept: RADIOLOGY | Facility: HOSPITAL | Age: 68
Discharge: HOME | End: 2023-12-18
Attending: FAMILY MEDICINE
Payer: MEDICARE

## 2023-12-18 VITALS — HEIGHT: 62 IN | BODY MASS INDEX: 31.1 KG/M2 | WEIGHT: 169 LBS

## 2023-12-18 DIAGNOSIS — Z12.31 OTHER SCREENING MAMMOGRAM: ICD-10-CM

## 2023-12-18 PROCEDURE — 77067 SCR MAMMO BI INCL CAD: CPT | Mod: TC

## 2023-12-31 ENCOUNTER — EXTERNAL CHRONIC CARE MANAGEMENT (OUTPATIENT)
Dept: FAMILY MEDICINE | Facility: CLINIC | Age: 68
End: 2023-12-31
Payer: MEDICARE

## 2023-12-31 PROCEDURE — G0511 CCM/BHI BY RHC/FQHC 20MIN MO: HCPCS | Mod: ,,, | Performed by: FAMILY MEDICINE

## 2024-01-31 ENCOUNTER — EXTERNAL CHRONIC CARE MANAGEMENT (OUTPATIENT)
Dept: FAMILY MEDICINE | Facility: CLINIC | Age: 69
End: 2024-01-31
Payer: MEDICARE

## 2024-01-31 DIAGNOSIS — E89.40 ASYMPTOMATIC POSTSURGICAL MENOPAUSE: Chronic | ICD-10-CM

## 2024-01-31 DIAGNOSIS — E11.40 TYPE 2 DIABETES MELLITUS WITH DIABETIC NEUROPATHY, WITHOUT LONG-TERM CURRENT USE OF INSULIN: Chronic | ICD-10-CM

## 2024-01-31 DIAGNOSIS — K21.9 GASTROESOPHAGEAL REFLUX DISEASE WITHOUT ESOPHAGITIS: Chronic | ICD-10-CM

## 2024-01-31 PROCEDURE — G0511 CCM/BHI BY RHC/FQHC 20MIN MO: HCPCS | Mod: ,,, | Performed by: FAMILY MEDICINE

## 2024-01-31 RX ORDER — GLIPIZIDE 5 MG/1
5 TABLET ORAL 2 TIMES DAILY WITH MEALS
Qty: 180 TABLET | Refills: 2 | Status: SHIPPED | OUTPATIENT
Start: 2024-01-31

## 2024-01-31 RX ORDER — PANTOPRAZOLE SODIUM 40 MG/1
40 TABLET, DELAYED RELEASE ORAL DAILY
Qty: 90 TABLET | Refills: 1 | Status: CANCELLED | OUTPATIENT
Start: 2024-01-31

## 2024-01-31 RX ORDER — GLIPIZIDE 5 MG/1
5 TABLET ORAL 2 TIMES DAILY WITH MEALS
Qty: 180 TABLET | Refills: 2 | Status: CANCELLED | OUTPATIENT
Start: 2024-01-31

## 2024-01-31 RX ORDER — ESTRADIOL 1 MG/1
1 TABLET ORAL DAILY
Qty: 90 TABLET | Refills: 1 | Status: CANCELLED | OUTPATIENT
Start: 2024-01-31

## 2024-01-31 RX ORDER — ESTRADIOL 1 MG/1
1 TABLET ORAL DAILY
Qty: 90 TABLET | Refills: 1 | Status: SHIPPED | OUTPATIENT
Start: 2024-01-31

## 2024-01-31 RX ORDER — ESTRADIOL 1 MG/1
1 TABLET ORAL DAILY
Qty: 90 TABLET | Refills: 1 | Status: SHIPPED | OUTPATIENT
Start: 2024-01-31 | End: 2024-01-31 | Stop reason: SDUPTHER

## 2024-01-31 RX ORDER — PANTOPRAZOLE SODIUM 40 MG/1
40 TABLET, DELAYED RELEASE ORAL DAILY
Qty: 90 TABLET | Refills: 1 | Status: SHIPPED | OUTPATIENT
Start: 2024-01-31 | End: 2024-01-31 | Stop reason: SDUPTHER

## 2024-01-31 RX ORDER — PANTOPRAZOLE SODIUM 40 MG/1
40 TABLET, DELAYED RELEASE ORAL DAILY
Qty: 90 TABLET | Refills: 1 | Status: SHIPPED | OUTPATIENT
Start: 2024-01-31

## 2024-02-13 ENCOUNTER — OFFICE VISIT (OUTPATIENT)
Dept: FAMILY MEDICINE | Facility: CLINIC | Age: 69
End: 2024-02-13
Payer: MEDICARE

## 2024-02-13 VITALS
BODY MASS INDEX: 31.1 KG/M2 | OXYGEN SATURATION: 95 % | HEART RATE: 71 BPM | DIASTOLIC BLOOD PRESSURE: 75 MMHG | SYSTOLIC BLOOD PRESSURE: 120 MMHG | WEIGHT: 169 LBS | HEIGHT: 62 IN

## 2024-02-13 DIAGNOSIS — N18.31 STAGE 3A CHRONIC KIDNEY DISEASE: Chronic | ICD-10-CM

## 2024-02-13 DIAGNOSIS — J61 ASBESTOSIS: Chronic | ICD-10-CM

## 2024-02-13 DIAGNOSIS — I70.0 AORTIC ATHEROSCLEROSIS: Chronic | ICD-10-CM

## 2024-02-13 DIAGNOSIS — R06.02 SHORT OF BREATH ON EXERTION: Primary | ICD-10-CM

## 2024-02-13 DIAGNOSIS — R56.9 CONVULSIONS, UNSPECIFIED CONVULSION TYPE: Chronic | ICD-10-CM

## 2024-02-13 DIAGNOSIS — E11.40 TYPE 2 DIABETES MELLITUS WITH DIABETIC NEUROPATHY, WITHOUT LONG-TERM CURRENT USE OF INSULIN: Chronic | ICD-10-CM

## 2024-02-13 DIAGNOSIS — F33.41 RECURRENT MAJOR DEPRESSIVE DISORDER, IN PARTIAL REMISSION: Chronic | ICD-10-CM

## 2024-02-13 DIAGNOSIS — Z20.828 VIRAL DISEASE EXPOSURE: ICD-10-CM

## 2024-02-13 DIAGNOSIS — M54.6 ACUTE RIGHT-SIDED THORACIC BACK PAIN: ICD-10-CM

## 2024-02-13 DIAGNOSIS — I10 ESSENTIAL HYPERTENSION: Chronic | ICD-10-CM

## 2024-02-13 PROBLEM — N18.32 STAGE 3B CHRONIC KIDNEY DISEASE: Status: RESOLVED | Noted: 2023-02-28 | Resolved: 2024-02-13

## 2024-02-13 LAB
CTP QC/QA: YES
CTP QC/QA: YES
POC MOLECULAR INFLUENZA A AGN: NEGATIVE
POC MOLECULAR INFLUENZA B AGN: NEGATIVE
SARS-COV-2 RDRP RESP QL NAA+PROBE: NEGATIVE

## 2024-02-13 PROCEDURE — 87635 SARS-COV-2 COVID-19 AMP PRB: CPT | Mod: RHCUB | Performed by: FAMILY MEDICINE

## 2024-02-13 PROCEDURE — 99214 OFFICE O/P EST MOD 30 MIN: CPT | Mod: ,,, | Performed by: FAMILY MEDICINE

## 2024-02-13 PROCEDURE — 87502 INFLUENZA DNA AMP PROBE: CPT | Mod: RHCUB | Performed by: FAMILY MEDICINE

## 2024-02-13 RX ORDER — MELOXICAM 7.5 MG/1
7.5 TABLET ORAL DAILY
Qty: 30 TABLET | Refills: 0 | Status: SHIPPED | OUTPATIENT
Start: 2024-02-13

## 2024-02-13 NOTE — PROGRESS NOTES
Robert Gallegos MD   [facility]  [facility]ICAL GROUP [address] [address]  [phone]      PATIENT NAME: Rosalva Lopez  : 1955  DATE: 24  MRN: [id number]      Billing Provider: Robert Gallegos MD  Level of Service:   Patient PCP Information       Provider PCP Type    Robert Gallegos MD General            Reason for Visit / Chief Complaint: Shortness of Breath, Fatigue, and right upper back pain (X 2 weeks)       Update PCP  Update Chief Complaint         History of Present Illness / Problem Focused Workflow     Rosalva Lopez presents to the clinic with Shortness of Breath, Fatigue, and right upper back pain (X 2 weeks)       She has a history of asbestosis.  Also has seasonal allergies.        Review of Systems     Review of Systems   Constitutional:  Positive for fatigue.   HENT:  Positive for nasal congestion, rhinorrhea, sneezing and sore throat.    Respiratory:  Positive for cough and shortness of breath.    Musculoskeletal:  Positive for back pain.        Medical / Social / Family History     Past Medical History:   Diagnosis Date    Depressive disorder     Diabetes     GERD (gastroesophageal reflux disease)     Hypothyroidism     Trigeminal neuralgia of left side of face        Past Surgical History:   Procedure Laterality Date    BREAST BIOPSY      CARPAL TUNNEL RELEASE Left     CHOLECYSTECTOMY      HYSTERECTOMY      INGUINAL HERNIA REPAIR      OOPHORECTOMY      TONSILLECTOMY      TYMPANOPLASTY         Social History    reports that she has never smoked. She has never been exposed to tobacco smoke. She has never used smokeless tobacco. She reports that she does not drink alcohol and does not use drugs.   Social History     Tobacco Use    Smoking status: Never     Passive exposure: Never    Smokeless tobacco: Never   Substance Use Topics    Alcohol use: Never    Drug use: Never       Family History  Family History    Problem Relation Age of Onset    Heart disease Mother     Hypertension Father     Breast cancer Maternal Grandmother     Diabetes Brother        Medications and Allergies     Medications  No outpatient medications have been marked as taking for the 2/13/24 encounter (Office Visit) with Robert Gallegos MD.       Allergies  Review of patient's allergies indicates:   Allergen Reactions    Fluzone [id number] tri-whole     Sulfa (sulfonamide antibiotics)     Tamiflu [oseltamivir]        Physical Examination     Vitals:    02/13/24 1130   BP: 120/75   Pulse: 71     Physical Exam  Vitals reviewed.   Constitutional:       Appearance: Normal appearance.   HENT:      Head: Normocephalic and atraumatic.      Ears:      Comments: MARGO, mild R     Mouth/Throat:      Pharynx: Posterior oropharyngeal erythema present.   Eyes:      Extraocular Movements: Extraocular movements intact.      Conjunctiva/sclera: Conjunctivae normal.      Pupils: Pupils are equal, round, and reactive to light.   Cardiovascular:      Rate and Rhythm: Normal rate and regular rhythm.      Heart sounds: Normal heart sounds.   Pulmonary:      Effort: Pulmonary effort is normal. No respiratory distress.      Breath sounds: Normal breath sounds. No wheezing, rhonchi or rales.   Musculoskeletal:         General: Tenderness present. Normal range of motion.      Cervical back: Normal range of motion.   Skin:     General: Skin is warm and dry.   Neurological:      General: No focal deficit present.      Mental Status: She is alert and oriented to person, place, and time.   Psychiatric:         Mood and Affect: Mood normal.         Behavior: Behavior normal.        Office Visit on 02/13/2024   Component Date Value Ref Range Status    POC Rapid COVID 02/13/2024 Negative  Negative Final     Acceptable 02/13/2024 Yes   Final    POC Molecular Influenza A Ag 02/13/2024 Negative  Negative, Not Reported Final    POC Molecular Influenza B Ag 02/13/2024  Negative  Negative, Not Reported Final     Acceptable 02/13/2024 Yes   Final       Assessment and Plan (including Health Maintenance)      Problem List  Smart Sets  Document Outside HM   :    Plan: will avoid steroids due to DM        Health Maintenance Due   Topic Date Due    Pneumococcal Vaccines (Age 65+) (1 of 2 - PCV) Never done    TETANUS VACCINE  Never done    Shingles Vaccine (1 of 2) Never done    RSV Vaccine (Age 60+ and Pregnant patients) (1 - 1-dose 60+ series) Never done    COVID-19 Vaccine (3 - 2023-24 season) 09/01/2023    Foot Exam  03/31/2024       Problem List Items Addressed This Visit          Pulmonary    Asbestosis       Cardiac/Vascular    Essential hypertension       Endocrine    Type 2 diabetes mellitus with diabetic neuropathy, without long-term current use of insulin (Chronic)     Other Visit Diagnoses       Short of breath on exertion    -  Primary    Viral disease exposure        Relevant Orders    POCT COVID-19 Rapid Screening (Completed)    POCT Influenza A/B Molecular (Completed)    Acute right-sided thoracic back pain        Relevant Medications    meloxicam (MOBIC) 7.5 MG tablet            Health Maintenance Topics with due status: Not Due       Topic Last Completion Date    Colorectal Cancer Screening 02/03/2015    DEXA Scan 07/12/2021    High Dose Statin 10/13/2023    Diabetes Urine Screening 10/13/2023    Lipid Panel 10/13/2023    Eye Exam 12/06/2023    Hemoglobin A1c 12/14/2023    Mammogram 12/18/2023       Future Appointments   Date Time Provider Department Center   12/23/2024  1:40 PM [facility]            Signature:  Robert Gallegos MD  [facility]  MED[facility]  [address] [place] [address] [place] [id number]  [phone]    Date of encounter: 2/13/24

## 2024-02-29 ENCOUNTER — EXTERNAL CHRONIC CARE MANAGEMENT (OUTPATIENT)
Dept: FAMILY MEDICINE | Facility: CLINIC | Age: 69
End: 2024-02-29
Payer: MEDICARE

## 2024-02-29 DIAGNOSIS — R56.9 CONVULSIONS, UNSPECIFIED CONVULSION TYPE: Primary | ICD-10-CM

## 2024-02-29 DIAGNOSIS — I10 ESSENTIAL HYPERTENSION: ICD-10-CM

## 2024-02-29 DIAGNOSIS — E78.2 MIXED HYPERLIPIDEMIA: Chronic | ICD-10-CM

## 2024-02-29 PROCEDURE — G0511 CCM/BHI BY RHC/FQHC 20MIN MO: HCPCS | Mod: ,,, | Performed by: FAMILY MEDICINE

## 2024-02-29 RX ORDER — AMLODIPINE BESYLATE 10 MG/1
10 TABLET ORAL DAILY
Qty: 90 TABLET | Refills: 1 | Status: SHIPPED | OUTPATIENT
Start: 2024-02-29 | End: 2024-02-29

## 2024-02-29 RX ORDER — AMLODIPINE BESYLATE 10 MG/1
10 TABLET ORAL DAILY
Qty: 90 TABLET | Refills: 1 | Status: SHIPPED | OUTPATIENT
Start: 2024-02-29 | End: 2025-02-28

## 2024-02-29 RX ORDER — PRAVASTATIN SODIUM 40 MG/1
40 TABLET ORAL NIGHTLY
Qty: 90 TABLET | Refills: 3 | Status: SHIPPED | OUTPATIENT
Start: 2024-02-29

## 2024-02-29 RX ORDER — VALPROIC ACID 250 MG/1
250 CAPSULE, LIQUID FILLED ORAL 2 TIMES DAILY
Qty: 180 CAPSULE | Refills: 1 | Status: SHIPPED | OUTPATIENT
Start: 2024-02-29

## 2024-03-27 DIAGNOSIS — E11.40 TYPE 2 DIABETES MELLITUS WITH DIABETIC NEUROPATHY, WITHOUT LONG-TERM CURRENT USE OF INSULIN: Chronic | ICD-10-CM

## 2024-03-27 DIAGNOSIS — K21.9 GASTROESOPHAGEAL REFLUX DISEASE WITHOUT ESOPHAGITIS: Chronic | ICD-10-CM

## 2024-03-27 DIAGNOSIS — E03.9 ACQUIRED HYPOTHYROIDISM: Chronic | ICD-10-CM

## 2024-03-27 DIAGNOSIS — G57.93 NEUROPATHY OF BOTH FEET: Chronic | ICD-10-CM

## 2024-03-27 RX ORDER — LEVOTHYROXINE SODIUM 50 UG/1
50 TABLET ORAL
Qty: 90 TABLET | Refills: 1 | Status: SHIPPED | OUTPATIENT
Start: 2024-03-27 | End: 2025-03-27

## 2024-03-27 RX ORDER — GABAPENTIN 100 MG/1
CAPSULE ORAL
Qty: 360 CAPSULE | Refills: 1 | Status: SHIPPED | OUTPATIENT
Start: 2024-03-27

## 2024-03-27 RX ORDER — FAMOTIDINE 40 MG/1
40 TABLET, FILM COATED ORAL DAILY
Qty: 90 TABLET | Refills: 1 | Status: SHIPPED | OUTPATIENT
Start: 2024-03-27 | End: 2025-03-27

## 2024-03-31 ENCOUNTER — EXTERNAL CHRONIC CARE MANAGEMENT (OUTPATIENT)
Dept: FAMILY MEDICINE | Facility: CLINIC | Age: 69
End: 2024-03-31
Payer: MEDICARE

## 2024-03-31 PROCEDURE — G0511 CCM/BHI BY RHC/FQHC 20MIN MO: HCPCS | Mod: ,,, | Performed by: FAMILY MEDICINE

## 2024-04-19 RX ORDER — ALBUTEROL SULFATE 90 UG/1
2 AEROSOL, METERED RESPIRATORY (INHALATION) EVERY 6 HOURS PRN
Qty: 8 G | Refills: 0 | Status: SHIPPED | OUTPATIENT
Start: 2024-04-19 | End: 2024-04-19 | Stop reason: SDUPTHER

## 2024-04-19 RX ORDER — ALBUTEROL SULFATE 90 UG/1
2 AEROSOL, METERED RESPIRATORY (INHALATION) EVERY 6 HOURS PRN
Qty: 8 G | Refills: 1 | Status: SHIPPED | OUTPATIENT
Start: 2024-04-19

## 2024-04-19 NOTE — TELEPHONE ENCOUNTER
----- Message from Brianna Vergara MD sent at 7/30/2017 12:13 PM CDT -----  I have reviewed patients results.  Normal TSH, continue levothyroxine at current dose.  Will need visit for further refills as was last seen greater than 6 months ago in October..  Please contact patient to convey results.    Brianna Vergara MD     Pt requesting refill

## 2024-04-30 ENCOUNTER — EXTERNAL CHRONIC CARE MANAGEMENT (OUTPATIENT)
Dept: FAMILY MEDICINE | Facility: CLINIC | Age: 69
End: 2024-04-30
Payer: MEDICARE

## 2024-04-30 PROCEDURE — G0511 CCM/BHI BY RHC/FQHC 20MIN MO: HCPCS | Mod: ,,, | Performed by: FAMILY MEDICINE

## 2024-05-01 DIAGNOSIS — F33.41 RECURRENT MAJOR DEPRESSIVE DISORDER, IN PARTIAL REMISSION: Chronic | ICD-10-CM

## 2024-05-01 RX ORDER — TRAZODONE HYDROCHLORIDE 50 MG/1
50 TABLET ORAL DAILY
Qty: 90 TABLET | Refills: 1 | Status: SHIPPED | OUTPATIENT
Start: 2024-05-01

## 2024-05-31 ENCOUNTER — EXTERNAL CHRONIC CARE MANAGEMENT (OUTPATIENT)
Dept: FAMILY MEDICINE | Facility: CLINIC | Age: 69
End: 2024-05-31
Payer: MEDICARE

## 2024-05-31 PROCEDURE — G0511 CCM/BHI BY RHC/FQHC 20MIN MO: HCPCS | Mod: ,,, | Performed by: FAMILY MEDICINE

## 2024-06-14 ENCOUNTER — OFFICE VISIT (OUTPATIENT)
Dept: FAMILY MEDICINE | Facility: CLINIC | Age: 69
End: 2024-06-14
Payer: MEDICARE

## 2024-06-14 VITALS
OXYGEN SATURATION: 94 % | HEIGHT: 62 IN | SYSTOLIC BLOOD PRESSURE: 110 MMHG | HEART RATE: 73 BPM | DIASTOLIC BLOOD PRESSURE: 73 MMHG | WEIGHT: 180.13 LBS | BODY MASS INDEX: 33.15 KG/M2

## 2024-06-14 DIAGNOSIS — M25.611 DECREASED RANGE OF MOTION OF RIGHT SHOULDER: ICD-10-CM

## 2024-06-14 DIAGNOSIS — E11.40 TYPE 2 DIABETES MELLITUS WITH DIABETIC NEUROPATHY, WITHOUT LONG-TERM CURRENT USE OF INSULIN: Primary | Chronic | ICD-10-CM

## 2024-06-14 DIAGNOSIS — G57.93 NEUROPATHY OF BOTH FEET: Chronic | ICD-10-CM

## 2024-06-14 DIAGNOSIS — E11.9 ENCOUNTER FOR DIABETIC FOOT EXAM: ICD-10-CM

## 2024-06-14 DIAGNOSIS — G89.29 CHRONIC RIGHT SHOULDER PAIN: ICD-10-CM

## 2024-06-14 DIAGNOSIS — R20.8 HYPERALGESIA: Chronic | ICD-10-CM

## 2024-06-14 DIAGNOSIS — M25.511 CHRONIC RIGHT SHOULDER PAIN: ICD-10-CM

## 2024-06-14 PROCEDURE — G2211 COMPLEX E/M VISIT ADD ON: HCPCS | Mod: ,,, | Performed by: FAMILY MEDICINE

## 2024-06-14 PROCEDURE — 99214 OFFICE O/P EST MOD 30 MIN: CPT | Mod: ,,, | Performed by: FAMILY MEDICINE

## 2024-06-14 PROCEDURE — 83036 HEMOGLOBIN GLYCOSYLATED A1C: CPT | Mod: ,,, | Performed by: CLINICAL MEDICAL LABORATORY

## 2024-06-14 RX ORDER — PREDNISONE 20 MG/1
20 TABLET ORAL DAILY
Qty: 7 TABLET | Refills: 0 | Status: SHIPPED | OUTPATIENT
Start: 2024-06-14 | End: 2024-06-21

## 2024-06-14 NOTE — PROGRESS NOTES
Robert Gallegos MD   [facility]  MEDICAL GROUP [facility] FAMILY MEDICINE  [address] [place] [id number]  [phone]      PATIENT NAME: Rosalva Lopez  : 1955  DATE: 24  MRN: [id number]      Billing Provider: Robert Gallegos MD  Level of Service: CT OFFICE/OUTPT VISIT, EST, LEVL IV, 30-39 MIN  Patient PCP Information       Provider PCP Type    Robert Gallegos MD General            Reason for Visit / Chief Complaint: exam for diabetic shoes       Update PCP  Update Chief Complaint         History of Present Illness / Problem Focused Workflow     Rosalva Lopez presents to the clinic with exam for diabetic shoes       Follow up diabetes.  Needs foot exam and rx for diabetic shoes.  Is having right shoulder pain and decreased ROM.  Is interested in doing PT.  Needs xray done.      Review of Systems     Review of Systems   Respiratory:  Negative for shortness of breath.    Cardiovascular:  Negative for chest pain and palpitations.   Musculoskeletal:  Positive for arthralgias, back pain and myalgias.   Neurological:  Negative for weakness and numbness.        Medical / Social / Family History     Past Medical History:   Diagnosis Date    Depressive disorder     Diabetes     GERD (gastroesophageal reflux disease)     Hypothyroidism     Trigeminal neuralgia of left side of face        Past Surgical History:   Procedure Laterality Date    BREAST BIOPSY      CARPAL TUNNEL RELEASE Left     CHOLECYSTECTOMY      HYSTERECTOMY      INGUINAL HERNIA REPAIR      OOPHORECTOMY      TONSILLECTOMY      TYMPANOPLASTY         Social History    reports that she has never smoked. She has never been exposed to tobacco smoke. She has never used smokeless tobacco. She reports that she does not drink alcohol and does not use drugs.   Social History     Tobacco Use    Smoking status: Never     Passive exposure: Never    Smokeless tobacco: Never   Substance Use Topics    Alcohol use:  Never    Drug use: Never       Family History  Family History   Problem Relation Name Age of Onset    Heart disease Mother      Hypertension Father      Breast cancer Maternal Grandmother      Diabetes Brother         Medications and Allergies     Medications  Outpatient Medications Marked as Taking for the 6/14/24 encounter (Office Visit) with Robert Gallegos MD   Medication Sig Dispense Refill    acetaminophen (TYLENOL) 325 MG tablet Take 2 tablets (650 mg total) by mouth every 8 (eight) hours as needed.  0    albuterol (VENTOLIN HFA) 90 mcg/actuation inhaler Inhale 2 puffs into the lungs every 6 (six) hours as needed for Wheezing or Shortness of Breath. Rescue 8 g 1    amLODIPine (NORVASC) 10 MG tablet Take 1 tablet (10 mg total) by mouth once daily. 90 tablet 1    aspirin (ECOTRIN) 81 MG EC tablet Take 81 mg by mouth once daily.      diclofenac sodium (VOLTAREN) 1 % Gel Apply 2 g topically 4 (four) times daily. 100 g 0    ergocalciferol (ERGOCALCIFEROL) 50,000 unit Cap Take 5,000 Units by mouth once daily.      estradioL (ESTRACE) 1 MG tablet Take 1 tablet (1 mg total) by mouth once daily. 90 tablet 1    famotidine (PEPCID) 40 MG tablet Take 1 tablet (40 mg total) by mouth once daily. 90 tablet 1    gabapentin (NEURONTIN) 100 MG capsule Take two capsules by mouth twice daily. 360 capsule 1    glipiZIDE (GLUCOTROL) 5 MG tablet Take 1 tablet (5 mg total) by mouth 2 (two) times daily with meals. 180 tablet 2    levothyroxine (SYNTHROID) 50 MCG tablet Take 1 tablet (50 mcg total) by mouth before breakfast. 90 tablet 1    LORazepam (ATIVAN) 0.5 MG tablet Take 1 tablet (0.5 mg total) by mouth every 6 (six) hours as needed for Anxiety. 14 tablet 2    meloxicam (MOBIC) 7.5 MG tablet Take 1 tablet (7.5 mg total) by mouth once daily. 30 tablet 0    pantoprazole (PROTONIX) 40 MG tablet Take 1 tablet (40 mg total) by mouth once daily. 90 tablet 1    pravastatin (PRAVACHOL) 40 MG tablet Take 1 tablet (40 mg total) by  mouth every evening. 90 tablet 3    semaglutide (OZEMPIC) 0.25 mg or 0.5 mg(2 mg/1.5 mL) pen injector Inject 0.25 mg into the skin every 7 days. 1 each 0    traZODone (DESYREL) 50 MG tablet Take 1 tablet (50 mg total) by mouth once daily. 90 tablet 1    valproic acid (DEPAKENE) 250 mg capsule Take 1 capsule (250 mg total) by mouth 2 (two) times daily. 180 capsule 1       Allergies  Review of patient's allergies indicates:   Allergen Reactions    Fluzone [id number] tri-whole     Sulfa (sulfonamide antibiotics)     Tamiflu [oseltamivir]        Physical Examination     Vitals:    06/14/24 1324   BP: 110/73   Pulse: 73     Physical Exam  Vitals reviewed.   Constitutional:       Appearance: Normal appearance.   HENT:      Head: Normocephalic and atraumatic.   Eyes:      Extraocular Movements: Extraocular movements intact.      Conjunctiva/sclera: Conjunctivae normal.      Pupils: Pupils are equal, round, and reactive to light.   Cardiovascular:      Rate and Rhythm: Normal rate and regular rhythm.      Pulses:           Dorsalis pedis pulses are 2+ on the right side and 2+ on the left side.        Posterior tibial pulses are 2+ on the right side and 2+ on the left side.      Heart sounds: Normal heart sounds.   Pulmonary:      Effort: Pulmonary effort is normal.      Breath sounds: Normal breath sounds.   Musculoskeletal:         General: Tenderness (right shoulder) present.      Cervical back: Normal range of motion.      Right lower leg: Edema (pedal) present.      Left lower leg: Edema (pedal) present.      Comments: ROM right shoulder is significantly decreased   Feet:      Right foot:      Protective Sensation: 10 sites tested.  10 sites sensed.      Skin integrity: Skin integrity normal.      Toenail Condition: Right toenails are normal.      Left foot:      Protective Sensation: 10 sites tested.  10 sites sensed.      Skin integrity: Skin integrity normal.      Toenail Condition: Left toenails are normal.       Comments: Hyperalgesia of feet  Skin:     General: Skin is warm and dry.   Neurological:      General: No focal deficit present.      Mental Status: She is alert and oriented to person, place, and time.   Psychiatric:         Mood and Affect: Mood normal.         Behavior: Behavior normal.        Component Ref Range & Units 6 mo ago  (12/14/23) 11 mo ago  (7/5/23) 1 yr ago  (2/28/23) 1 yr ago  (11/18/22) 2 yr ago  (5/13/22) 2 yr ago  (9/23/21)   Hemoglobin A1C 4.5 - 6.6 % 6.3 6.7 High  CM 7.2 High  CM 8.1 High  CM 10.7 High  CM 10.8 High  CM   Comment:  Normal:               <5.7%  Pre-Diabetic:       5.7% to 6.4%  Diabetic:             >6.4%  Diabetic Goal:     <7%   Estimated Average Glucose mg/dL 134 137 154 184 270 274   Resulting Agency  [facility]                       Assessment and Plan (including Health Maintenance)      Problem List  Smart Sets  Document Outside HM   :    Plan: This encounter included increased complexity inherent to the evaluation and management associated with medical care services that serve as the continuing focal point for all needed health care services and/or with medical care services that are part of ongoing care related to a patients single, serious condition or a complex condition.  Previous labs and encounters have been reviewed during the course of this visit in order to make medical decisions related to ongoing care of the patient.         Health Maintenance Due   Topic Date Due    Pneumococcal Vaccines (Age 65+) (1 of 2 - PCV) Never done    TETANUS VACCINE  Never done    Shingles Vaccine (1 of 2) Never done    RSV Vaccine (Age 60+ and Pregnant patients) (1 - 1-dose 60+ series) Never done    COVID-19 Vaccine (3 - 2023-24 season) 09/01/2023    Hemoglobin A1c  06/14/2024    DEXA Scan  07/12/2024       Problem List Items Addressed This Visit          Neuro     Neuropathy of both feet (Chronic)       Endocrine    Type 2 diabetes mellitus with diabetic neuropathy, without long-term current use of insulin - Primary (Chronic)    Relevant Orders    Hemoglobin A1C     Other Visit Diagnoses       Encounter for diabetic foot exam        Hyperalgesia  (Chronic)       of feet    Chronic right shoulder pain        Relevant Medications    predniSONE (DELTASONE) 20 MG tablet    Other Relevant Orders    Ambulatory referral/consult to Physical/Occupational Therapy    X-ray Shoulder 2 or More Views Right    Decreased range of motion of right shoulder        Relevant Medications    predniSONE (DELTASONE) 20 MG tablet    Other Relevant Orders    Ambulatory referral/consult to Physical/Occupational Therapy    X-ray Shoulder 2 or More Views Right            Health Maintenance Topics with due status: Not Due       Topic Last Completion Date    Colorectal Cancer Screening 02/03/2015    Diabetes Urine Screening 10/13/2023    Lipid Panel 10/13/2023    Eye Exam 12/06/2023    Mammogram 12/18/2023    High Dose Statin 06/14/2024    Foot Exam 06/14/2024       Future Appointments   Date Time Provider Department Center   10/18/2024  1:00 PM [facility] NURSE, [place] [facility]   12/23/2024  1:40 PM [facility]            Signature:  MD TONY [facility]  MEDICAL GROUP [facility] - FAMILY MEDICINE  [address] [place] [id number]  [phone]    Date of encounter: 6/14/24

## 2024-06-15 LAB
EST. AVERAGE GLUCOSE BLD GHB EST-MCNC: 163 MG/DL
HBA1C MFR BLD HPLC: 7.3 % (ref 4.5–6.6)

## 2024-06-20 ENCOUNTER — APPOINTMENT (OUTPATIENT)
Dept: RADIOLOGY | Facility: CLINIC | Age: 69
End: 2024-06-20
Attending: FAMILY MEDICINE
Payer: MEDICARE

## 2024-06-20 DIAGNOSIS — M25.611 DECREASED RANGE OF MOTION OF RIGHT SHOULDER: ICD-10-CM

## 2024-06-20 DIAGNOSIS — G89.29 CHRONIC RIGHT SHOULDER PAIN: ICD-10-CM

## 2024-06-20 DIAGNOSIS — M25.511 CHRONIC RIGHT SHOULDER PAIN: ICD-10-CM

## 2024-06-20 PROCEDURE — 73030 X-RAY EXAM OF SHOULDER: CPT | Mod: TC,RHCUB,FY,RT | Performed by: FAMILY MEDICINE

## 2024-06-27 ENCOUNTER — OFFICE VISIT (OUTPATIENT)
Dept: FAMILY MEDICINE | Facility: CLINIC | Age: 69
End: 2024-06-27
Payer: MEDICARE

## 2024-06-27 VITALS
HEART RATE: 83 BPM | HEIGHT: 62 IN | BODY MASS INDEX: 33.18 KG/M2 | SYSTOLIC BLOOD PRESSURE: 119 MMHG | DIASTOLIC BLOOD PRESSURE: 75 MMHG | OXYGEN SATURATION: 95 % | WEIGHT: 180.31 LBS

## 2024-06-27 DIAGNOSIS — R06.02 SHORT OF BREATH ON EXERTION: ICD-10-CM

## 2024-06-27 DIAGNOSIS — E11.40 TYPE 2 DIABETES MELLITUS WITH DIABETIC NEUROPATHY, WITHOUT LONG-TERM CURRENT USE OF INSULIN: Chronic | ICD-10-CM

## 2024-06-27 DIAGNOSIS — I10 ESSENTIAL HYPERTENSION: Primary | ICD-10-CM

## 2024-06-27 DIAGNOSIS — E03.9 ACQUIRED HYPOTHYROIDISM: Chronic | ICD-10-CM

## 2024-06-27 PROCEDURE — 85025 COMPLETE CBC W/AUTO DIFF WBC: CPT | Mod: ,,, | Performed by: CLINICAL MEDICAL LABORATORY

## 2024-06-27 PROCEDURE — 80048 BASIC METABOLIC PNL TOTAL CA: CPT | Mod: ,,, | Performed by: CLINICAL MEDICAL LABORATORY

## 2024-06-27 PROCEDURE — 84443 ASSAY THYROID STIM HORMONE: CPT | Mod: ,,, | Performed by: CLINICAL MEDICAL LABORATORY

## 2024-06-27 PROCEDURE — G2211 COMPLEX E/M VISIT ADD ON: HCPCS | Mod: ,,, | Performed by: FAMILY MEDICINE

## 2024-06-27 PROCEDURE — 99214 OFFICE O/P EST MOD 30 MIN: CPT | Mod: ,,, | Performed by: FAMILY MEDICINE

## 2024-06-27 RX ORDER — PREDNISONE 20 MG/1
20 TABLET ORAL DAILY
Start: 2024-06-27

## 2024-06-27 RX ORDER — LATANOPROST 50 UG/ML
1 SOLUTION/ DROPS OPHTHALMIC NIGHTLY
COMMUNITY
Start: 2024-06-10

## 2024-06-27 NOTE — PROGRESS NOTES
Robert Gallegos MD   [facility]  MEDICAL GROUP [facility] FAMILY MEDICINE  [address] [place] [id number]  [phone]      PATIENT NAME: Rosalva Lopez  : 1955  DATE: 24  MRN: [id number]      Billing Provider: Robert Gallegos MD  Level of Service: [place] OFFICE/OUTPT VISIT, EST, LEVL IV, 30-39 MIN  Patient PCP Information       Provider PCP Type    Robret Gallegos MD General            Reason for Visit / Chief Complaint: Shortness of Breath and Fatigue (Started monday)       Update PCP  Update Chief Complaint         History of Present Illness / Problem Focused Workflow     Rosalva Lopez presents to the clinic with Shortness of Breath and Fatigue (Started monday)       She has mild asthma and also sarcoidosis.  History of asbestosis.    Says that a couple of days prior to symptoms starting she cleaned out her bathroom using bleach solution.  Was referred to PT for shoulder at last visit but has not started yet.    Shortness of Breath    Fatigue  Associated symptoms include fatigue.       Review of Systems     Review of Systems   Constitutional:  Positive for fatigue.   Respiratory:  Positive for shortness of breath.         Medical / Social / Family History     Past Medical History:   Diagnosis Date    Depressive disorder     Diabetes     GERD (gastroesophageal reflux disease)     Hypothyroidism     Trigeminal neuralgia of left side of face        Past Surgical History:   Procedure Laterality Date    BREAST BIOPSY      CARPAL TUNNEL RELEASE Left     CHOLECYSTECTOMY      HYSTERECTOMY      INGUINAL HERNIA REPAIR      OOPHORECTOMY      TONSILLECTOMY      TYMPANOPLASTY         Social History    reports that she has never smoked. She has never been exposed to tobacco smoke. She has never used smokeless tobacco. She reports that she does not drink alcohol and does not use drugs.   Social History     Tobacco Use    Smoking status: Never     Passive exposure: Never     Smokeless tobacco: Never   Substance Use Topics    Alcohol use: Never    Drug use: Never       Family History  Family History   Problem Relation Name Age of Onset    Heart disease Mother      Hypertension Father      Breast cancer Maternal Grandmother      Diabetes Brother         Medications and Allergies     Medications  Outpatient Medications Marked as Taking for the 6/27/24 encounter (Office Visit) with Robert Gallegos MD   Medication Sig Dispense Refill    acetaminophen (TYLENOL) 325 MG tablet Take 2 tablets (650 mg total) by mouth every 8 (eight) hours as needed.  0    albuterol (VENTOLIN HFA) 90 mcg/actuation inhaler Inhale 2 puffs into the lungs every 6 (six) hours as needed for Wheezing or Shortness of Breath. Rescue 8 g 1    amLODIPine (NORVASC) 10 MG tablet Take 1 tablet (10 mg total) by mouth once daily. 90 tablet 1    aspirin (ECOTRIN) 81 MG EC tablet Take 81 mg by mouth once daily.      diclofenac sodium (VOLTAREN) 1 % Gel Apply 2 g topically 4 (four) times daily. 100 g 0    ergocalciferol (ERGOCALCIFEROL) 50,000 unit Cap Take 5,000 Units by mouth once daily.      estradioL (ESTRACE) 1 MG tablet Take 1 tablet (1 mg total) by mouth once daily. 90 tablet 1    famotidine (PEPCID) 40 MG tablet Take 1 tablet (40 mg total) by mouth once daily. 90 tablet 1    gabapentin (NEURONTIN) 100 MG capsule Take two capsules by mouth twice daily. 360 capsule 1    glipiZIDE (GLUCOTROL) 5 MG tablet Take 1 tablet (5 mg total) by mouth 2 (two) times daily with meals. 180 tablet 2    latanoprost 0.005 % ophthalmic solution Place 1 drop into both eyes every evening.      levothyroxine (SYNTHROID) 50 MCG tablet Take 1 tablet (50 mcg total) by mouth before breakfast. 90 tablet 1    LORazepam (ATIVAN) 0.5 MG tablet Take 1 tablet (0.5 mg total) by mouth every 6 (six) hours as needed for Anxiety. 14 tablet 2    meloxicam (MOBIC) 7.5 MG tablet Take 1 tablet (7.5 mg total) by mouth once daily. 30 tablet 0    pantoprazole  (PROTONIX) 40 MG tablet Take 1 tablet (40 mg total) by mouth once daily. 90 tablet 1    pravastatin (PRAVACHOL) 40 MG tablet Take 1 tablet (40 mg total) by mouth every evening. 90 tablet 3    semaglutide (OZEMPIC) 0.25 mg or 0.5 mg(2 mg/1.5 mL) pen injector Inject 0.25 mg into the skin every 7 days. 1 each 0    traZODone (DESYREL) 50 MG tablet Take 1 tablet (50 mg total) by mouth once daily. 90 tablet 1    valproic acid (DEPAKENE) 250 mg capsule Take 1 capsule (250 mg total) by mouth 2 (two) times daily. 180 capsule 1       Allergies  Review of patient's allergies indicates:   Allergen Reactions    Fluzone [id number] tri-whole     Sulfa (sulfonamide antibiotics)     Tamiflu [oseltamivir]        Physical Examination     Vitals:    06/27/24 1302   BP: 119/75   Pulse: 83     Physical Exam  Vitals reviewed.   Constitutional:       Appearance: Normal appearance.   HENT:      Head: Normocephalic and atraumatic.   Eyes:      Extraocular Movements: Extraocular movements intact.      Conjunctiva/sclera: Conjunctivae normal.      Pupils: Pupils are equal, round, and reactive to light.   Cardiovascular:      Rate and Rhythm: Normal rate and regular rhythm.      Heart sounds: Normal heart sounds.   Pulmonary:      Effort: Pulmonary effort is normal. No respiratory distress.      Breath sounds: Normal breath sounds. No wheezing, rhonchi or rales.   Musculoskeletal:         General: Normal range of motion.      Cervical back: Normal range of motion.   Skin:     General: Skin is warm and dry.   Neurological:      General: No focal deficit present.      Mental Status: She is alert and oriented to person, place, and time.   Psychiatric:         Mood and Affect: Mood normal.         Behavior: Behavior normal.            X-ray Shoulder 2 or More Views Right  Narrative: EXAMINATION:  XR SHOULDER COMPLETE 2 OR MORE VIEWS RIGHT    CLINICAL HISTORY:  .  Pain in right shoulder    COMPARISON:  No previous similar    TECHNIQUE:  Right  shoulder two views    FINDINGS:  There is no acute fracture or dislocation.  There is mild osteophyte formation of the glenohumeral joint.  Impression: Mild osteoarthritis right shoulder    Electronically signed by: Ayaz Hwang  Date:    06/20/2024  Time:    11:23    Component Ref Range & Units 13 d ago  (6/14/24) 6 mo ago  (12/14/23) 11 mo ago  (7/5/23) 1 yr ago  (2/28/23) 1 yr ago  (11/18/22) 2 yr ago  (5/13/22) 2 yr ago  (9/23/21)   Hemoglobin A1C 4.5 - 6.6 % 7.3 High  6.3 CM 6.7 High  CM 7.2 High  CM 8.1 High  CM 10.7 High  CM 10.8 High  CM   Comment:  Normal:               <5.7%  Pre-Diabetic:       5.7% to 6.4%  Diabetic:             >6.4%  Diabetic Goal:     <7%   Estimated Average Glucose mg/dL 163 134 137 154 184 270 274   Resulting Agency  [facility]              Specimen Collected: 06/14/24 14:09 CDT Last Resulted: 06/15/24 12:59 CDT           EXAMINATION:  XR CHEST PA AND LATERAL     CLINICAL HISTORY:  Cough, unspecified     COMPARISON:  Chest x-ray December 1, 2020     TECHNIQUE:  Frontal and lateral views of the chest.     FINDINGS:  The cardiomediastinal silhouette is stable in configuration.  Chronic change of the lungs without focal consolidation, pleural effusion, or pneumothorax.  Visualized osseous and surrounding soft tissue structures appear grossly unchanged.  Mild bibasilar atelectasis/scarring.     Impression:     No acute cardiopulmonary process demonstrated.     Point of Service: [facility]        Electronically signed by:Pipe Nichole  Date:                                            08/21/2022  Time:                                           08:18           Exam Ended: 08/21/22 01:18 CDT Last Resulted: 08/21/22 08:18 CDT               Assessment and Plan (including Health Maintenance)      Problem List  Smart Sets  Document  Outside HM   :    Plan: This encounter included increased complexity inherent to the evaluation and management associated with medical care services that serve as the continuing focal point for all needed health care services and/or with medical care services that are part of ongoing care related to a patients single, serious condition or a complex condition.  Previous labs and encounters have been reviewed during the course of this visit in order to make medical decisions related to ongoing care of the patient.         Health Maintenance Due   Topic Date Due    Pneumococcal Vaccines (Age 65+) (1 of 2 - PCV) Never done    TETANUS VACCINE  Never done    Shingles Vaccine (1 of 2) Never done    RSV Vaccine (Age 60+ and Pregnant patients) (1 - 1-dose 60+ series) Never done    COVID-19 Vaccine (3 - 2023-24 season) 09/01/2023    DEXA Scan  07/12/2024       Problem List Items Addressed This Visit          Cardiac/Vascular    Essential hypertension - Primary    Relevant Orders    CBC Auto Differential    Basic Metabolic Panel       Endocrine    Type 2 diabetes mellitus with diabetic neuropathy, without long-term current use of insulin (Chronic)    Acquired hypothyroidism (Chronic)    Relevant Orders    TSH     Other Visit Diagnoses       Short of breath on exertion        Relevant Medications    predniSONE (DELTASONE) 20 MG tablet            Health Maintenance Topics with due status: Not Due       Topic Last Completion Date    Colorectal Cancer Screening 02/03/2015    Diabetes Urine Screening 10/13/2023    Lipid Panel 10/13/2023    Eye Exam 12/06/2023    Mammogram 12/18/2023    Foot Exam 06/14/2024    Hemoglobin A1c 06/14/2024    High Dose Statin 06/27/2024       Future Appointments   Date Time Provider Department Center   10/18/2024  1:00 PM [facility] NURSE, [place] [facility]   12/23/2024  1:40 PM [facility]            Signature:  Robert Gallegos MD  [facility]  MEDICAL GROUP [facility] MEDICINE  [address] [place] [id number]  [phone]    Date of encounter: 6/27/24

## 2024-06-28 LAB
ANION GAP SERPL CALCULATED.3IONS-SCNC: 16 MMOL/L (ref 7–16)
BASOPHILS # BLD AUTO: 0.14 K/UL (ref 0–0.2)
BASOPHILS NFR BLD AUTO: 1.1 % (ref 0–1)
BUN SERPL-MCNC: 26 MG/DL (ref 7–18)
BUN/CREAT SERPL: 16 (ref 6–20)
CALCIUM SERPL-MCNC: 10 MG/DL (ref 8.5–10.1)
CHLORIDE SERPL-SCNC: 103 MMOL/L (ref 98–107)
CO2 SERPL-SCNC: 24 MMOL/L (ref 21–32)
CREAT SERPL-MCNC: 1.59 MG/DL (ref 0.55–1.02)
DIFFERENTIAL METHOD BLD: ABNORMAL
EGFR (NO RACE VARIABLE) (RUSH/TITUS): 35 ML/MIN/1.73M2
EOSINOPHIL # BLD AUTO: 0.24 K/UL (ref 0–0.5)
EOSINOPHIL NFR BLD AUTO: 1.9 % (ref 1–4)
ERYTHROCYTE [DISTWIDTH] IN BLOOD BY AUTOMATED COUNT: 16.4 % (ref 11.5–14.5)
GLUCOSE SERPL-MCNC: 130 MG/DL (ref 74–106)
HCT VFR BLD AUTO: 46.6 % (ref 38–47)
HGB BLD-MCNC: 14.8 G/DL (ref 12–16)
IMM GRANULOCYTES # BLD AUTO: 0.07 K/UL (ref 0–0.04)
IMM GRANULOCYTES NFR BLD: 0.6 % (ref 0–0.4)
LYMPHOCYTES # BLD AUTO: 2.61 K/UL (ref 1–4.8)
LYMPHOCYTES NFR BLD AUTO: 20.5 % (ref 27–41)
MCH RBC QN AUTO: 28.8 PG (ref 27–31)
MCHC RBC AUTO-ENTMCNC: 31.8 G/DL (ref 32–36)
MCV RBC AUTO: 90.7 FL (ref 80–96)
MONOCYTES # BLD AUTO: 1.41 K/UL (ref 0–0.8)
MONOCYTES NFR BLD AUTO: 11.1 % (ref 2–6)
MPC BLD CALC-MCNC: 11.6 FL (ref 9.4–12.4)
NEUTROPHILS # BLD AUTO: 8.25 K/UL (ref 1.8–7.7)
NEUTROPHILS NFR BLD AUTO: 64.8 % (ref 53–65)
NRBC # BLD AUTO: 0 X10E3/UL
NRBC, AUTO (.00): 0 %
PLATELET # BLD AUTO: 114 K/UL (ref 150–400)
POTASSIUM SERPL-SCNC: 4.6 MMOL/L (ref 3.5–5.1)
RBC # BLD AUTO: 5.14 M/UL (ref 4.2–5.4)
SODIUM SERPL-SCNC: 138 MMOL/L (ref 136–145)
TSH SERPL DL<=0.005 MIU/L-ACNC: 2.07 UIU/ML (ref 0.36–3.74)
WBC # BLD AUTO: 12.72 K/UL (ref 4.5–11)

## 2024-06-30 ENCOUNTER — EXTERNAL CHRONIC CARE MANAGEMENT (OUTPATIENT)
Dept: FAMILY MEDICINE | Facility: CLINIC | Age: 69
End: 2024-06-30
Payer: MEDICARE

## 2024-06-30 PROCEDURE — G0511 CCM/BHI BY RHC/FQHC 20MIN MO: HCPCS | Mod: ,,, | Performed by: FAMILY MEDICINE

## 2024-07-23 ENCOUNTER — OFFICE VISIT (OUTPATIENT)
Dept: FAMILY MEDICINE | Facility: CLINIC | Age: 69
End: 2024-07-23
Payer: MEDICARE

## 2024-07-23 VITALS
BODY MASS INDEX: 32.41 KG/M2 | OXYGEN SATURATION: 94 % | DIASTOLIC BLOOD PRESSURE: 78 MMHG | HEART RATE: 79 BPM | SYSTOLIC BLOOD PRESSURE: 123 MMHG | WEIGHT: 176.13 LBS | HEIGHT: 62 IN | TEMPERATURE: 98 F

## 2024-07-23 DIAGNOSIS — D72.829 LEUKOCYTOSIS, UNSPECIFIED TYPE: ICD-10-CM

## 2024-07-23 DIAGNOSIS — E89.40 ASYMPTOMATIC POSTSURGICAL MENOPAUSE: Chronic | ICD-10-CM

## 2024-07-23 DIAGNOSIS — I10 ESSENTIAL HYPERTENSION: Chronic | ICD-10-CM

## 2024-07-23 DIAGNOSIS — N18.32 STAGE 3B CHRONIC KIDNEY DISEASE: ICD-10-CM

## 2024-07-23 DIAGNOSIS — Z20.828 VIRAL DISEASE EXPOSURE: ICD-10-CM

## 2024-07-23 DIAGNOSIS — R53.83 FATIGUE, UNSPECIFIED TYPE: Primary | ICD-10-CM

## 2024-07-23 DIAGNOSIS — E11.40 TYPE 2 DIABETES MELLITUS WITH DIABETIC NEUROPATHY, WITHOUT LONG-TERM CURRENT USE OF INSULIN: Chronic | ICD-10-CM

## 2024-07-23 DIAGNOSIS — K21.9 GASTROESOPHAGEAL REFLUX DISEASE WITHOUT ESOPHAGITIS: Chronic | ICD-10-CM

## 2024-07-23 DIAGNOSIS — E03.9 ACQUIRED HYPOTHYROIDISM: Chronic | ICD-10-CM

## 2024-07-23 PROCEDURE — 85025 COMPLETE CBC W/AUTO DIFF WBC: CPT | Mod: ,,, | Performed by: CLINICAL MEDICAL LABORATORY

## 2024-07-23 PROCEDURE — 87635 SARS-COV-2 COVID-19 AMP PRB: CPT | Mod: RHCUB | Performed by: FAMILY MEDICINE

## 2024-07-23 PROCEDURE — 87502 INFLUENZA DNA AMP PROBE: CPT | Mod: RHCUB | Performed by: FAMILY MEDICINE

## 2024-07-23 PROCEDURE — 99214 OFFICE O/P EST MOD 30 MIN: CPT | Mod: ,,, | Performed by: FAMILY MEDICINE

## 2024-07-23 RX ORDER — PANTOPRAZOLE SODIUM 40 MG/1
40 TABLET, DELAYED RELEASE ORAL DAILY
Qty: 90 TABLET | Refills: 1 | Status: SHIPPED | OUTPATIENT
Start: 2024-07-23

## 2024-07-23 RX ORDER — ESTRADIOL 1 MG/1
1 TABLET ORAL DAILY
Qty: 90 TABLET | Refills: 1 | Status: SHIPPED | OUTPATIENT
Start: 2024-07-23

## 2024-07-23 NOTE — PROGRESS NOTES
Robert Gallegos MD   [facility]  MEDICAL GROUP [facility]  [address] [place] [id number]  [phone]      PATIENT NAME: Rosalva Lopez  : 1955  DATE: 24  MRN: [id number]      Billing Provider: Robert Gallegos MD  Level of Service:   Patient PCP Information       Provider PCP Type    Robert Gallegos MD General            Reason for Visit / Chief Complaint: Fatigue (Started )       Update PCP  Update Chief Complaint         History of Present Illness / Problem Focused Workflow     Rosalva Lopez presents to the clinic with Fatigue (Started )       Denies any fever, chills, nausea, vomiting or diarrhea.        Review of Systems     Review of Systems   Constitutional:  Positive for fatigue. Negative for activity change, chills and fever.   HENT:  Negative for sore throat.    Eyes:  Negative for pain.   Respiratory:  Negative for cough, chest tightness and shortness of breath.    Cardiovascular:  Negative for chest pain and palpitations.   Gastrointestinal:  Negative for abdominal pain.   Neurological:  Negative for dizziness, syncope and weakness.   Psychiatric/Behavioral:  Negative for confusion.         Medical / Social / Family History     Past Medical History:   Diagnosis Date    Depressive disorder     Diabetes     GERD (gastroesophageal reflux disease)     Hypothyroidism     Trigeminal neuralgia of left side of face        Past Surgical History:   Procedure Laterality Date    BREAST BIOPSY      CARPAL TUNNEL RELEASE Left     CHOLECYSTECTOMY      HYSTERECTOMY      INGUINAL HERNIA REPAIR      OOPHORECTOMY      TONSILLECTOMY      TYMPANOPLASTY         Social History    reports that she has never smoked. She has never been exposed to tobacco smoke. She has never used smokeless tobacco. She reports that she does not drink alcohol and does not use drugs.   Social History     Tobacco Use    Smoking status: Never     Passive  exposure: Never    Smokeless tobacco: Never   Substance Use Topics    Alcohol use: Never    Drug use: Never       Family History  Family History   Problem Relation Name Age of Onset    Heart disease Mother      Hypertension Father      Breast cancer Maternal Grandmother      Diabetes Brother         Medications and Allergies     Medications  No outpatient medications have been marked as taking for the 7/23/24 encounter (Office Visit) with Robert Gallegos MD.       Allergies  Review of patient's allergies indicates:   Allergen Reactions    Fluzone [id number] tri-whole     Sulfa (sulfonamide antibiotics)     Tamiflu [oseltamivir]        Physical Examination     Vitals:    07/23/24 1434   BP: 123/78   Pulse: 79   Temp: 97.9 °F (36.6 °C)     Physical Exam  Vitals reviewed.   Constitutional:       Appearance: Normal appearance.   HENT:      Head: Normocephalic and atraumatic.   Eyes:      Extraocular Movements: Extraocular movements intact.      Conjunctiva/sclera: Conjunctivae normal.      Pupils: Pupils are equal, round, and reactive to light.   Cardiovascular:      Rate and Rhythm: Normal rate and regular rhythm.      Heart sounds: Normal heart sounds.   Pulmonary:      Effort: Pulmonary effort is normal.      Breath sounds: Normal breath sounds.   Musculoskeletal:         General: Normal range of motion.      Cervical back: Normal range of motion.   Skin:     General: Skin is warm and dry.   Neurological:      General: No focal deficit present.      Mental Status: She is alert and oriented to person, place, and time.   Psychiatric:         Mood and Affect: Mood normal.         Behavior: Behavior normal.          Office Visit on 07/23/2024   Component Date Value Ref Range Status    POC Rapid COVID 07/23/2024 Negative  Negative Final     Acceptable 07/23/2024 Yes   Final    POC Molecular Influenza A Ag 07/23/2024 Negative  Negative Final    POC Molecular Influenza B Ag 07/23/2024 Negative  Negative  Final     Acceptable 07/23/2024 Yes   Final         Assessment and Plan (including Health Maintenance)      Problem List  Smart Sets  Document Outside HM   :    Plan: will recheck CBC        Health Maintenance Due   Topic Date Due    Pneumococcal Vaccines (Age 65+) (1 of 2 - PCV) Never done    TETANUS VACCINE  Never done    Shingles Vaccine (1 of 2) Never done    RSV Vaccine (Age 60+ and Pregnant patients) (1 - 1-dose 60+ series) Never done    COVID-19 Vaccine (3 - 2023-24 season) 09/01/2023    DEXA Scan  07/12/2024       Problem List Items Addressed This Visit          Cardiac/Vascular    Essential hypertension       Renal/    Stage 3b chronic kidney disease       Endocrine    Type 2 diabetes mellitus with diabetic neuropathy, without long-term current use of insulin (Chronic)    Acquired hypothyroidism (Chronic)       GI    Gastroesophageal reflux disease without esophagitis (Chronic)    Relevant Medications    pantoprazole (PROTONIX) 40 MG tablet     Other Visit Diagnoses       Fatigue, unspecified type    -  Primary    Viral disease exposure        Relevant Orders    POCT COVID-19 Rapid Screening (Completed)    POCT Influenza A/B Molecular (Completed)    Asymptomatic postsurgical menopause  (Chronic)       Relevant Medications    estradioL (ESTRACE) 1 MG tablet    Leukocytosis, unspecified type        Relevant Orders    CBC Auto Differential            Health Maintenance Topics with due status: Not Due       Topic Last Completion Date    Colorectal Cancer Screening 02/03/2015    Influenza Vaccine 10/13/2023    Diabetes Urine Screening 10/13/2023    Lipid Panel 10/13/2023    Eye Exam 12/06/2023    Mammogram 12/18/2023    Foot Exam 06/14/2024    Hemoglobin A1c 06/14/2024    High Dose Statin 06/27/2024       Future Appointments   Date Time Provider Department Center   10/18/2024  1:00 PM [facility] NURSE, [place] [facility]   12/23/2024  1:40 PM [facility]  Jovi Rodríguez            Signature:  MD JOVI Murcia [facility]  MEDICAL GROUP [address] [place] [address]  [phone]    Date of encounter: 7/23/24

## 2024-07-24 LAB
BASOPHILS # BLD AUTO: 0.07 K/UL (ref 0–0.2)
BASOPHILS NFR BLD AUTO: 0.9 % (ref 0–1)
DIFFERENTIAL METHOD BLD: ABNORMAL
EOSINOPHIL # BLD AUTO: 0.23 K/UL (ref 0–0.5)
EOSINOPHIL NFR BLD AUTO: 3 % (ref 1–4)
ERYTHROCYTE [DISTWIDTH] IN BLOOD BY AUTOMATED COUNT: 16.6 % (ref 11.5–14.5)
HCT VFR BLD AUTO: 50 % (ref 38–47)
HGB BLD-MCNC: 15.9 G/DL (ref 12–16)
IMM GRANULOCYTES # BLD AUTO: 0.06 K/UL (ref 0–0.04)
IMM GRANULOCYTES NFR BLD: 0.8 % (ref 0–0.4)
LYMPHOCYTES # BLD AUTO: 2.09 K/UL (ref 1–4.8)
LYMPHOCYTES NFR BLD AUTO: 26.8 % (ref 27–41)
MCH RBC QN AUTO: 28.4 PG (ref 27–31)
MCHC RBC AUTO-ENTMCNC: 31.8 G/DL (ref 32–36)
MCV RBC AUTO: 89.4 FL (ref 80–96)
MONOCYTES # BLD AUTO: 0.87 K/UL (ref 0–0.8)
MONOCYTES NFR BLD AUTO: 11.2 % (ref 2–6)
MPC BLD CALC-MCNC: 12 FL (ref 9.4–12.4)
NEUTROPHILS # BLD AUTO: 4.47 K/UL (ref 1.8–7.7)
NEUTROPHILS NFR BLD AUTO: 57.3 % (ref 53–65)
NRBC # BLD AUTO: 0 X10E3/UL
NRBC, AUTO (.00): 0 %
PLATELET # BLD AUTO: 195 K/UL (ref 150–400)
RBC # BLD AUTO: 5.59 M/UL (ref 4.2–5.4)
WBC # BLD AUTO: 7.79 K/UL (ref 4.5–11)

## 2024-07-31 ENCOUNTER — EXTERNAL CHRONIC CARE MANAGEMENT (OUTPATIENT)
Dept: FAMILY MEDICINE | Facility: CLINIC | Age: 69
End: 2024-07-31
Payer: MEDICARE

## 2024-07-31 PROCEDURE — G0511 CCM/BHI BY RHC/FQHC 20MIN MO: HCPCS | Mod: ,,, | Performed by: FAMILY MEDICINE

## 2024-08-27 DIAGNOSIS — I10 ESSENTIAL HYPERTENSION: ICD-10-CM

## 2024-08-27 DIAGNOSIS — R56.9 CONVULSIONS, UNSPECIFIED CONVULSION TYPE: ICD-10-CM

## 2024-08-27 RX ORDER — AMLODIPINE BESYLATE 10 MG/1
10 TABLET ORAL DAILY
Qty: 90 TABLET | Refills: 1 | Status: SHIPPED | OUTPATIENT
Start: 2024-08-27 | End: 2025-08-27

## 2024-08-27 RX ORDER — VALPROIC ACID 250 MG/1
250 CAPSULE, LIQUID FILLED ORAL 2 TIMES DAILY
Qty: 180 CAPSULE | Refills: 1 | Status: SHIPPED | OUTPATIENT
Start: 2024-08-27

## 2024-08-31 ENCOUNTER — EXTERNAL CHRONIC CARE MANAGEMENT (OUTPATIENT)
Dept: FAMILY MEDICINE | Facility: CLINIC | Age: 69
End: 2024-08-31
Payer: MEDICARE

## 2024-08-31 PROCEDURE — G0511 CCM/BHI BY RHC/FQHC 20MIN MO: HCPCS | Mod: ,,, | Performed by: FAMILY MEDICINE

## 2024-09-25 DIAGNOSIS — K21.9 GASTROESOPHAGEAL REFLUX DISEASE WITHOUT ESOPHAGITIS: Chronic | ICD-10-CM

## 2024-09-25 DIAGNOSIS — G57.93 NEUROPATHY OF BOTH FEET: Chronic | ICD-10-CM

## 2024-09-25 DIAGNOSIS — E11.40 TYPE 2 DIABETES MELLITUS WITH DIABETIC NEUROPATHY, WITHOUT LONG-TERM CURRENT USE OF INSULIN: Chronic | ICD-10-CM

## 2024-09-25 DIAGNOSIS — E03.9 ACQUIRED HYPOTHYROIDISM: Chronic | ICD-10-CM

## 2024-09-25 RX ORDER — GABAPENTIN 100 MG/1
CAPSULE ORAL
Qty: 360 CAPSULE | Refills: 0 | Status: SHIPPED | OUTPATIENT
Start: 2024-09-25

## 2024-09-25 RX ORDER — LEVOTHYROXINE SODIUM 50 UG/1
50 TABLET ORAL
Qty: 90 TABLET | Refills: 0 | Status: SHIPPED | OUTPATIENT
Start: 2024-09-25 | End: 2025-09-25

## 2024-09-25 RX ORDER — FAMOTIDINE 40 MG/1
40 TABLET, FILM COATED ORAL DAILY
Qty: 90 TABLET | Refills: 0 | Status: SHIPPED | OUTPATIENT
Start: 2024-09-25 | End: 2025-09-25

## 2024-09-26 DIAGNOSIS — K21.9 GASTROESOPHAGEAL REFLUX DISEASE WITHOUT ESOPHAGITIS: Chronic | ICD-10-CM

## 2024-09-26 DIAGNOSIS — E03.9 ACQUIRED HYPOTHYROIDISM: Chronic | ICD-10-CM

## 2024-09-30 ENCOUNTER — EXTERNAL CHRONIC CARE MANAGEMENT (OUTPATIENT)
Dept: FAMILY MEDICINE | Facility: CLINIC | Age: 69
End: 2024-09-30
Payer: MEDICARE

## 2024-09-30 PROCEDURE — G0511 CCM/BHI BY RHC/FQHC 20MIN MO: HCPCS | Mod: ,,, | Performed by: FAMILY MEDICINE

## 2024-10-01 RX ORDER — FAMOTIDINE 40 MG/1
40 TABLET, FILM COATED ORAL
Qty: 90 TABLET | Refills: 0 | Status: SHIPPED | OUTPATIENT
Start: 2024-10-01

## 2024-10-01 RX ORDER — LEVOTHYROXINE SODIUM 50 UG/1
50 TABLET ORAL
Qty: 90 TABLET | Refills: 0 | Status: SHIPPED | OUTPATIENT
Start: 2024-10-01

## 2024-10-26 DIAGNOSIS — F33.41 RECURRENT MAJOR DEPRESSIVE DISORDER, IN PARTIAL REMISSION: Chronic | ICD-10-CM

## 2024-10-29 RX ORDER — TRAZODONE HYDROCHLORIDE 50 MG/1
50 TABLET ORAL
Qty: 90 TABLET | Refills: 0 | Status: SHIPPED | OUTPATIENT
Start: 2024-10-29

## 2024-10-31 ENCOUNTER — EXTERNAL CHRONIC CARE MANAGEMENT (OUTPATIENT)
Dept: FAMILY MEDICINE | Facility: CLINIC | Age: 69
End: 2024-10-31
Payer: MEDICARE

## 2024-10-31 ENCOUNTER — OFFICE VISIT (OUTPATIENT)
Dept: FAMILY MEDICINE | Facility: CLINIC | Age: 69
End: 2024-10-31
Payer: MEDICARE

## 2024-10-31 VITALS
HEART RATE: 66 BPM | SYSTOLIC BLOOD PRESSURE: 128 MMHG | OXYGEN SATURATION: 97 % | RESPIRATION RATE: 14 BRPM | DIASTOLIC BLOOD PRESSURE: 72 MMHG | HEIGHT: 62 IN | WEIGHT: 177.81 LBS | BODY MASS INDEX: 32.72 KG/M2

## 2024-10-31 DIAGNOSIS — Z00.00 ENCOUNTER FOR SUBSEQUENT ANNUAL WELLNESS VISIT (AWV) IN MEDICARE PATIENT: Primary | ICD-10-CM

## 2024-10-31 DIAGNOSIS — N18.32 STAGE 3B CHRONIC KIDNEY DISEASE: ICD-10-CM

## 2024-10-31 DIAGNOSIS — E66.811 CLASS 1 OBESITY DUE TO EXCESS CALORIES WITH SERIOUS COMORBIDITY AND BODY MASS INDEX (BMI) OF 32.0 TO 32.9 IN ADULT: ICD-10-CM

## 2024-10-31 DIAGNOSIS — E11.40 TYPE 2 DIABETES MELLITUS WITH DIABETIC NEUROPATHY, WITHOUT LONG-TERM CURRENT USE OF INSULIN: Chronic | ICD-10-CM

## 2024-10-31 DIAGNOSIS — E78.2 MIXED HYPERLIPIDEMIA: Chronic | ICD-10-CM

## 2024-10-31 DIAGNOSIS — I70.0 AORTIC ATHEROSCLEROSIS: ICD-10-CM

## 2024-10-31 DIAGNOSIS — E03.9 ACQUIRED HYPOTHYROIDISM: Chronic | ICD-10-CM

## 2024-10-31 DIAGNOSIS — I10 ESSENTIAL HYPERTENSION: ICD-10-CM

## 2024-10-31 DIAGNOSIS — E66.09 CLASS 1 OBESITY DUE TO EXCESS CALORIES WITH SERIOUS COMORBIDITY AND BODY MASS INDEX (BMI) OF 32.0 TO 32.9 IN ADULT: ICD-10-CM

## 2024-10-31 DIAGNOSIS — Z78.0 POST-MENOPAUSE: ICD-10-CM

## 2024-10-31 DIAGNOSIS — F33.41 RECURRENT MAJOR DEPRESSIVE DISORDER, IN PARTIAL REMISSION: ICD-10-CM

## 2024-10-31 PROCEDURE — G0511 CCM/BHI BY RHC/FQHC 20MIN MO: HCPCS | Mod: ,,, | Performed by: FAMILY MEDICINE

## 2024-10-31 PROCEDURE — G0439 PPPS, SUBSEQ VISIT: HCPCS | Mod: ,,, | Performed by: FAMILY MEDICINE

## 2024-11-14 ENCOUNTER — HOSPITAL ENCOUNTER (EMERGENCY)
Facility: HOSPITAL | Age: 69
Discharge: HOME | End: 2024-11-14
Payer: MEDICARE

## 2024-11-14 VITALS
BODY MASS INDEX: 32.57 KG/M2 | RESPIRATION RATE: 18 BRPM | HEART RATE: 90 BPM | WEIGHT: 177 LBS | DIASTOLIC BLOOD PRESSURE: 85 MMHG | HEIGHT: 62 IN | TEMPERATURE: 98 F | OXYGEN SATURATION: 94 % | SYSTOLIC BLOOD PRESSURE: 143 MMHG

## 2024-11-14 DIAGNOSIS — K52.9 GASTROENTERITIS: Primary | ICD-10-CM

## 2024-11-14 LAB
ALBUMIN SERPL BCP-MCNC: 3.5 G/DL (ref 3.4–4.8)
ALBUMIN/GLOB SERPL: 0.9 {RATIO}
ALP SERPL-CCNC: 86 U/L (ref 40–150)
ALT SERPL W P-5'-P-CCNC: 27 U/L (ref 0–55)
AMYLASE SERPL-CCNC: 63 U/L (ref 20–160)
ANION GAP SERPL CALCULATED.3IONS-SCNC: 16 MMOL/L (ref 7–16)
AST SERPL W P-5'-P-CCNC: 37 U/L (ref 5–34)
BASOPHILS # BLD AUTO: 0.05 K/UL (ref 0–0.2)
BASOPHILS NFR BLD AUTO: 0.3 % (ref 0–1)
BILIRUB SERPL-MCNC: 0.3 MG/DL
BUN SERPL-MCNC: 26 MG/DL (ref 10–20)
BUN/CREAT SERPL: 21 (ref 6–20)
CALCIUM SERPL-MCNC: 9.1 MG/DL (ref 8.4–10.2)
CHLORIDE SERPL-SCNC: 105 MMOL/L (ref 98–107)
CO2 SERPL-SCNC: 24 MMOL/L (ref 23–31)
CREAT SERPL-MCNC: 1.21 MG/DL (ref 0.55–1.02)
DIFFERENTIAL METHOD BLD: ABNORMAL
EGFR (NO RACE VARIABLE) (RUSH/TITUS): 48 ML/MIN/1.73M2
EOSINOPHIL # BLD AUTO: 0.22 K/UL (ref 0–0.5)
EOSINOPHIL NFR BLD AUTO: 1.5 % (ref 1–4)
ERYTHROCYTE [DISTWIDTH] IN BLOOD BY AUTOMATED COUNT: 15.7 % (ref 11.5–14.5)
GLOBULIN SER-MCNC: 4 G/DL (ref 2–4)
GLUCOSE SERPL-MCNC: 207 MG/DL (ref 82–115)
HCT VFR BLD AUTO: 43.9 % (ref 38–47)
HGB BLD-MCNC: 14.5 G/DL (ref 12–16)
IMM GRANULOCYTES # BLD AUTO: 0.05 K/UL (ref 0–0.04)
IMM GRANULOCYTES NFR BLD: 0.3 % (ref 0–0.4)
LIPASE SERPL-CCNC: 10 U/L
LYMPHOCYTES # BLD AUTO: 1.1 K/UL (ref 1–4.8)
LYMPHOCYTES NFR BLD AUTO: 7.6 % (ref 27–41)
MCH RBC QN AUTO: 28.7 PG (ref 27–31)
MCHC RBC AUTO-ENTMCNC: 33 G/DL (ref 32–36)
MCV RBC AUTO: 86.9 FL (ref 80–96)
MONOCYTES # BLD AUTO: 0.91 K/UL (ref 0–0.8)
MONOCYTES NFR BLD AUTO: 6.3 % (ref 2–6)
MPC BLD CALC-MCNC: 10.5 FL (ref 9.4–12.4)
NEUTROPHILS # BLD AUTO: 12.17 K/UL (ref 1.8–7.7)
NEUTROPHILS NFR BLD AUTO: 84 % (ref 53–65)
NRBC # BLD AUTO: 0 X10E3/UL
NRBC, AUTO (.00): 0 %
PLATELET # BLD AUTO: 250 K/UL (ref 150–400)
POTASSIUM SERPL-SCNC: 3.8 MMOL/L (ref 3.5–5.1)
PROT SERPL-MCNC: 7.5 G/DL (ref 5.8–7.6)
RBC # BLD AUTO: 5.05 M/UL (ref 4.2–5.4)
SODIUM SERPL-SCNC: 141 MMOL/L (ref 136–145)
WBC # BLD AUTO: 14.5 K/UL (ref 4.5–11)

## 2024-11-14 PROCEDURE — 96374 THER/PROPH/DIAG INJ IV PUSH: CPT

## 2024-11-14 PROCEDURE — 25500020 PHARM REV CODE 255: Performed by: NURSE PRACTITIONER

## 2024-11-14 PROCEDURE — 63600175 PHARM REV CODE 636 W HCPCS: Performed by: NURSE PRACTITIONER

## 2024-11-14 PROCEDURE — 80053 COMPREHEN METABOLIC PANEL: CPT | Performed by: NURSE PRACTITIONER

## 2024-11-14 PROCEDURE — 85025 COMPLETE CBC W/AUTO DIFF WBC: CPT | Performed by: NURSE PRACTITIONER

## 2024-11-14 PROCEDURE — 82150 ASSAY OF AMYLASE: CPT | Performed by: NURSE PRACTITIONER

## 2024-11-14 PROCEDURE — 99285 EMERGENCY DEPT VISIT HI MDM: CPT | Mod: 25

## 2024-11-14 PROCEDURE — 83690 ASSAY OF LIPASE: CPT | Performed by: NURSE PRACTITIONER

## 2024-11-14 PROCEDURE — 99284 EMERGENCY DEPT VISIT MOD MDM: CPT | Mod: GF | Performed by: NURSE PRACTITIONER

## 2024-11-14 RX ORDER — ONDANSETRON HYDROCHLORIDE 2 MG/ML
4 INJECTION, SOLUTION INTRAVENOUS
Status: COMPLETED | OUTPATIENT
Start: 2024-11-14 | End: 2024-11-14

## 2024-11-14 RX ORDER — IOPAMIDOL 755 MG/ML
100 INJECTION, SOLUTION INTRAVASCULAR
Status: COMPLETED | OUTPATIENT
Start: 2024-11-14 | End: 2024-11-14

## 2024-11-14 RX ORDER — ONDANSETRON 4 MG/1
4 TABLET, ORALLY DISINTEGRATING ORAL EVERY 8 HOURS PRN
Qty: 15 TABLET | Refills: 0 | Status: SHIPPED | OUTPATIENT
Start: 2024-11-14 | End: 2024-11-14

## 2024-11-14 RX ORDER — ONDANSETRON 4 MG/1
4 TABLET, ORALLY DISINTEGRATING ORAL EVERY 8 HOURS PRN
Qty: 15 TABLET | Refills: 0 | Status: SHIPPED | OUTPATIENT
Start: 2024-11-14

## 2024-11-14 RX ADMIN — IOPAMIDOL 100 ML: 755 INJECTION, SOLUTION INTRAVENOUS at 01:11

## 2024-11-14 RX ADMIN — ONDANSETRON 4 MG: 2 INJECTION INTRAMUSCULAR; INTRAVENOUS at 12:11

## 2024-11-14 NOTE — DISCHARGE INSTRUCTIONS
Increase oral fluids for hydration.  Avoid any greasy or spicy foods.  Call your family doctor or practitioner to schedule a follow-up appointment.  Take your medication as prescribed.  Return to the ER for uncontrolled vomiting or other concerning symptoms.

## 2024-11-14 NOTE — ED PROVIDER NOTES
Encounter Date: 11/14/2024       History     Chief Complaint   Patient presents with    Diarrhea    Nausea     Started at 530 this am. Reflux during the night after eating Pizza. Pt reports having virus last week.     Pt is a 69 year old  female who presents with diarrhea and nausea since last night after eating pizza. Denies fever, vomiting and abd pain      The history is provided by the patient.     Review of patient's allergies indicates:   Allergen Reactions    Fluzone [id number] tri-whole     Sulfa (sulfonamide antibiotics)     Tamiflu [oseltamivir]      Past Medical History:   Diagnosis Date    Depressive disorder     Diabetes     GERD (gastroesophageal reflux disease)     Hypothyroidism     Trigeminal neuralgia of left side of face      Past Surgical History:   Procedure Laterality Date    BREAST BIOPSY      CARPAL TUNNEL RELEASE Left     CHOLECYSTECTOMY      HYSTERECTOMY      INGUINAL HERNIA REPAIR      OOPHORECTOMY      TONSILLECTOMY      TYMPANOPLASTY       Family History   Problem Relation Name Age of Onset    Heart disease Mother      Hypertension Father      Breast cancer Maternal Grandmother      Diabetes Brother       Social History     Tobacco Use    Smoking status: Never     Passive exposure: Never    Smokeless tobacco: Never   Substance Use Topics    Alcohol use: Never    Drug use: Never     Review of Systems   Constitutional:  Negative for fever.   HENT:  Negative for sore throat.    Respiratory:  Negative for shortness of breath.    Cardiovascular:  Negative for chest pain.   Gastrointestinal:  Positive for diarrhea and nausea. Negative for abdominal pain and vomiting.   Genitourinary:  Negative for dysuria.   Musculoskeletal:  Negative for back pain.   Skin:  Negative for rash.   Neurological:  Negative for weakness.   Hematological:  Does not bruise/bleed easily.       Physical Exam     Initial Vitals [11/14/24 1133]   BP Pulse Resp Temp SpO2   (!) 155/98 93 20 98.4 °F (36.9 °C) (!) 94  "%      MAP       --         Physical Exam    Nursing note and vitals reviewed.  Constitutional: She appears well-developed and well-nourished. She is not diaphoretic. She is cooperative.  Non-toxic appearance. She appears ill. No distress.   HENT:   Head: Normocephalic and atraumatic.   Eyes: Pupils are equal, round, and reactive to light.   Neck: Neck supple.   Cardiovascular:  Normal rate, regular rhythm, normal heart sounds and intact distal pulses.     Exam reveals no gallop and no friction rub.       No murmur heard.  Pulmonary/Chest: Breath sounds normal. No respiratory distress. She has no wheezes. She has no rhonchi. She has no rales. She exhibits no tenderness.   Abdominal: Abdomen is soft and flat. Bowel sounds are normal. There is generalized abdominal tenderness.   Pt has generalized abd tenderness without focal area of increased tenderness.  Pt reports her abd is "always tender" due to all her abdominal surgeries.  There is no rebound and no guarding.   Musculoskeletal:      Cervical back: Neck supple.     Neurological: She is alert and oriented to person, place, and time.   Skin: Skin is warm and dry. Capillary refill takes less than 2 seconds.   Psychiatric: She has a normal mood and affect.         Medical Screening Exam   See Full Note    ED Course   Procedures  Labs Reviewed   COMPREHENSIVE METABOLIC PANEL - Abnormal       Result Value    Sodium 141      Potassium 3.8      Chloride 105      CO2 24      Anion Gap 16      Glucose 207 (*)     BUN 26 (*)     Creatinine 1.21 (*)     BUN/Creatinine Ratio 21 (*)     Calcium 9.1      Total Protein 7.5      Albumin 3.5      Globulin 4.0      A/G Ratio 0.9      Bilirubin, Total 0.3      Alk Phos 86      ALT 27      AST 37 (*)     eGFR 48 (*)    CBC WITH DIFFERENTIAL - Abnormal    WBC 14.50 (*)     RBC 5.05      Hemoglobin 14.5      Hematocrit 43.9      MCV 86.9      MCH 28.7      MCHC 33.0      RDW 15.7 (*)     Platelet Count 250      MPV 10.5      " Neutrophils % 84.0 (*)     Lymphocytes % 7.6 (*)     Monocytes % 6.3 (*)     Eosinophils % 1.5      Basophils % 0.3      Immature Granulocytes % 0.3      nRBC, Auto 0.0      Neutrophils, Abs 12.17 (*)     Lymphocytes, Absolute 1.10      Monocytes, Absolute 0.91 (*)     Eosinophils, Absolute 0.22      Basophils, Absolute 0.05      Immature Granulocytes, Absolute 0.05 (*)     nRBC, Absolute 0.00      Diff Type Auto     AMYLASE - Normal    Amylase 63     LIPASE - Normal    Lipase 10     CBC W/ AUTO DIFFERENTIAL    Narrative:     The following orders were created for panel order CBC auto differential.  Procedure                               Abnormality         Status                     ---------                               -----------         ------                     CBC with Differential[[id number]]       Abnormal            Final result                 Please view results for these tests on the individual orders.          Imaging Results              CT Abdomen Pelvis With IV Contrast NO Oral Contrast (Final result)  Result time 11/14/24 13:42:27      Final result by Graham Funez MD (11/14/24 13:42:27)                   Impression:      1. Question mild wall thickening of the distal body and antrum of the stomach with mucosal hyperemia.  Correlation for signs/symptoms of gastritis recommended.  2. Additionally noted is nonspecific intraluminal fluid within normal caliber loops of colon, as may be seen the setting of diarrheal illness.  3. Additional details of chronic and incidental findings, as provided in the body of the report.      Electronically signed by: Graham Funez  Date:    11/14/2024  Time:    13:42               Narrative:    EXAMINATION:  CT ABDOMEN PELVIS WITH IV CONTRAST    CLINICAL HISTORY:  abd pain, leukocytosis;    TECHNIQUE:  Low dose axial images, sagittal and coronal reformations were obtained from the lung bases to the pubic symphysis following the IV administration of 100 mL of  Isovue-370.    COMPARISON:  CT of the chest, abdomen, and pelvis performed 07/05/2023.    FINDINGS:  Lower chest: Atelectasis and/or scar at the visualized lung bases.    Liver: Suspected calcified granuloma.    Gallbladder and bile ducts: Status post cholecystectomy.    Pancreas: Unremarkable.    Spleen: Calcified granuloma.    Adrenals: Unremarkable.    Kidneys: Mild degree of cortical atrophy of both kidneys.  Subcentimeter hypodense lesions in both kidneys are too small to definitely characterize.  Nonspecific bilateral perinephric stranding.    Lymph nodes: Scattered mildly prominent number, but mostly small mesenteric and retroperitoneal lymph nodes are nonspecific and may be reactive in etiology.    Bowel and mesentery: Stomach appears distended with ingested material.  Question mild wall thickening with mucosal hyperemia of the distal body of the stomach and antrum.  Nonspecific intraluminal fluid within normal caliber loops of colon, as may be seen the setting diarrheal illness.  Colonic diverticulosis.Appendix not confidently identified.  No definite focal pericecal inflammation.    Nonspecific partially calcified ovoid soft tissue mass measuring on the order of 25 mm at the anti mesenteric border of the proximal left colon stable compared to remote CT performed 04/20/2027, doubtful clinical significance.  Findings may reflect remote post infectious/inflammatory etiology.    Abdominal aorta: Nonaneurysmal.  Moderate to heavy atherosclerotic calcification.    Inferior vena cava: Unremarkable.    Free fluid or free air: None.    Pelvis: Unremarkable.    Body wall: Remote operative sequela suggested anterior abdominal wall.  Calcifications within the gluteal soft tissues could reflect injection granuloma.    Bones: No definite acute abnormality.  Relatively modest degenerative change of the spine and hips.                                       Medications   ondansetron injection 4 mg (4 mg Intravenous Given  11/14/24 1204)   iopamidoL (ISOVUE-370) injection 100 mL (100 mLs Intravenous Given 11/14/24 1304)     Medical Decision Making  Problems Addressed:  Gastroenteritis: self-limited or minor problem    Amount and/or Complexity of Data Reviewed  External Data Reviewed: labs and notes.  Labs: ordered. Decision-making details documented in ED Course.  Radiology: ordered. Decision-making details documented in ED Course.    Risk  Prescription drug management.               ED Course as of 11/14/24 1349   Thu Nov 14, 2024   1223 WBC(!): 14.50 [AG]   1230 Glucose(!): 207 [AG]   1230 BUN(!): 26 [AG]   1230 Creatinine(!): 1.21 [AG]   1230 eGFR(!): 48 [AG]   1347 Ct is stable.  Will dc home to follow-up with PCP.  Will send home with Zofran to control the n/v and I will encouraged increased PO fluids for hydration.  [AG]      ED Course User Index  [AG] Lindsay Simpson FNP                           Clinical Impression:   Final diagnoses:  [K52.9] Gastroenteritis (Primary)        ED Disposition Condition    Discharge Stable          ED Prescriptions       Medication Sig Dispense Start Date End Date Auth. Provider    ondansetron (ZOFRAN-ODT) 4 MG TbDL Take 1 tablet (4 mg total) by mouth every 8 (eight) hours as needed (n/v). 15 tablet 11/14/2024 -- Lindsay Simpson FNP          Follow-up Information       Follow up With Specialties Details Why Contact Info    Robert Gallegos MD Family Medicine Schedule an appointment as soon as possible for a visit in 1 day  [address] [place] [address]  [facility] [place] [address]  [phone]               Lindsay Simpson FNP  11/14/24 [id number]

## 2024-11-22 ENCOUNTER — OFFICE VISIT (OUTPATIENT)
Dept: FAMILY MEDICINE | Facility: CLINIC | Age: 69
End: 2024-11-22
Payer: MEDICARE

## 2024-11-22 VITALS
OXYGEN SATURATION: 97 % | SYSTOLIC BLOOD PRESSURE: 119 MMHG | HEIGHT: 62 IN | BODY MASS INDEX: 32.48 KG/M2 | DIASTOLIC BLOOD PRESSURE: 80 MMHG | WEIGHT: 176.5 LBS | HEART RATE: 68 BPM

## 2024-11-22 DIAGNOSIS — J96.11 CHRONIC RESPIRATORY FAILURE WITH HYPOXIA, ON HOME O2 THERAPY: ICD-10-CM

## 2024-11-22 DIAGNOSIS — E11.40 TYPE 2 DIABETES MELLITUS WITH DIABETIC NEUROPATHY, WITHOUT LONG-TERM CURRENT USE OF INSULIN: Chronic | ICD-10-CM

## 2024-11-22 DIAGNOSIS — K52.9 GASTROENTERITIS: Primary | ICD-10-CM

## 2024-11-22 DIAGNOSIS — K21.9 GASTROESOPHAGEAL REFLUX DISEASE WITHOUT ESOPHAGITIS: Chronic | ICD-10-CM

## 2024-11-22 DIAGNOSIS — Z99.81 CHRONIC RESPIRATORY FAILURE WITH HYPOXIA, ON HOME O2 THERAPY: ICD-10-CM

## 2024-11-22 DIAGNOSIS — E78.2 MIXED HYPERLIPIDEMIA: Chronic | ICD-10-CM

## 2024-11-22 PROCEDURE — 80061 LIPID PANEL: CPT | Mod: ,,, | Performed by: CLINICAL MEDICAL LABORATORY

## 2024-11-22 NOTE — PROGRESS NOTES
Robert Gallegos MD   [facility]  MEDICAL GROUP [facility]  [address] [place] [address]  [phone]      PATIENT NAME: Rosalva Lopez  : 1955  DATE: 24  MRN: [id number]      Billing Provider: Robert Gallegos MD  Level of Service: OR OFFICE/OUTPT VISIT, EST, LEVL IV, 30-39 MIN  Patient PCP Information       Provider PCP Type    Robert Gallegos MD General            Reason for Visit / Chief Complaint: Other (Follow-up from ER visit  for gastroenteritis. States she is doing much better. )       Update PCP  Update Chief Complaint         History of Present Illness / Problem Focused Workflow     Rosalva Lopez presents to the clinic with Other (Follow-up from ER visit  for gastroenteritis. States she is doing much better. )       Symptoms started after eating pizza.  She was treated in ED and symptoms resolved.  I have reviewed the ED documentation and associated labs/imaging.      Review of Systems     Review of Systems   Constitutional:  Negative for activity change, chills and fever.   HENT:  Negative for sore throat.    Eyes:  Negative for pain.   Respiratory:  Negative for cough, chest tightness and shortness of breath.    Cardiovascular:  Negative for chest pain and palpitations.   Gastrointestinal:  Negative for abdominal pain.   Neurological:  Negative for dizziness, syncope and weakness.   Psychiatric/Behavioral:  Negative for confusion.         Medical / Social / Family History     Past Medical History:   Diagnosis Date    Depressive disorder     Diabetes     GERD (gastroesophageal reflux disease)     Hypothyroidism     Trigeminal neuralgia of left side of face        Past Surgical History:   Procedure Laterality Date    BREAST BIOPSY      CARPAL TUNNEL RELEASE Left     CHOLECYSTECTOMY      HYSTERECTOMY      INGUINAL HERNIA REPAIR      OOPHORECTOMY      TONSILLECTOMY      TYMPANOPLASTY         Social History    reports  that she has never smoked. She has never been exposed to tobacco smoke. She has never used smokeless tobacco. She reports that she does not drink alcohol and does not use drugs.   Social History     Tobacco Use    Smoking status: Never     Passive exposure: Never    Smokeless tobacco: Never   Substance Use Topics    Alcohol use: Never    Drug use: Never       Family History  Family History   Problem Relation Name Age of Onset    Heart disease Mother      Hypertension Father      Breast cancer Maternal Grandmother      Diabetes Brother         Medications and Allergies     Medications  No outpatient medications have been marked as taking for the 11/22/24 encounter (Office Visit) with Robert Gallegos MD.       Allergies  Review of patient's allergies indicates:   Allergen Reactions    Fluzone [id number] tri-whole     Sulfa (sulfonamide antibiotics)     Tamiflu [oseltamivir]        Physical Examination     Vitals:    11/22/24 1318   BP: 119/80   Pulse: 68     Physical Exam  Vitals reviewed.   Constitutional:       Appearance: Normal appearance.   HENT:      Head: Normocephalic and atraumatic.   Eyes:      Extraocular Movements: Extraocular movements intact.      Conjunctiva/sclera: Conjunctivae normal.      Pupils: Pupils are equal, round, and reactive to light.   Cardiovascular:      Rate and Rhythm: Normal rate and regular rhythm.      Heart sounds: Normal heart sounds.   Pulmonary:      Effort: Pulmonary effort is normal.      Breath sounds: Normal breath sounds.   Musculoskeletal:         General: Normal range of motion.      Cervical back: Normal range of motion.   Skin:     General: Skin is warm and dry.   Neurological:      General: No focal deficit present.      Mental Status: She is alert and oriented to person, place, and time.   Psychiatric:         Mood and Affect: Mood normal.         Behavior: Behavior normal.        No visits with results within 8 Day(s) from this visit.   Latest known visit with  results is:   Admission on 11/14/2024, Discharged on 11/14/2024   Component Date Value Ref Range Status    Sodium 11/14/2024 141  136 - 145 mmol/L Final    Potassium 11/14/2024 3.8  3.5 - 5.1 mmol/L Final    Chloride 11/14/2024 105  98 - 107 mmol/L Final    CO2 11/14/2024 24  23 - 31 mmol/L Final    Anion Gap 11/14/2024 16  7 - 16 mmol/L Final    Glucose 11/14/2024 207 (H)  82 - 115 mg/dL Final    BUN 11/14/2024 26 (H)  10 - 20 mg/dL Final    Creatinine 11/14/2024 1.21 (H)  0.55 - 1.02 mg/dL Final    BUN/Creatinine Ratio 11/14/2024 21 (H)  6 - 20 Final    Calcium 11/14/2024 9.1  8.4 - 10.2 mg/dL Final    Total Protein 11/14/2024 7.5  5.8 - 7.6 g/dL Final    Albumin 11/14/2024 3.5  3.4 - 4.8 g/dL Final    Globulin 11/14/2024 4.0  2.0 - 4.0 g/dL Final    A/G Ratio 11/14/2024 0.9   Final    Bilirubin, Total 11/14/2024 0.3  <=1.5 mg/dL Final    Alk Phos 11/14/2024 86  40 - 150 U/L Final    ALT 11/14/2024 27  0 - 55 U/L Final    AST 11/14/2024 37 (H)  5 - 34 U/L Final    eGFR 11/14/2024 48 (L)  >=60 mL/min/1.73m2 Final    Amylase 11/14/2024 63  20 - 160 U/L Final    Lipase 11/14/2024 10  <=60 U/L Final    WBC 11/14/2024 14.50 (H)  4.50 - 11.00 K/uL Final    RBC 11/14/2024 5.05  4.20 - 5.40 M/uL Final    Hemoglobin 11/14/2024 14.5  12.0 - 16.0 g/dL Final    Hematocrit 11/14/2024 43.9  38.0 - 47.0 % Final    MCV 11/14/2024 86.9  80.0 - 96.0 fL Final    MCH 11/14/2024 28.7  27.0 - 31.0 pg Final    MCHC 11/14/2024 33.0  32.0 - 36.0 g/dL Final    RDW 11/14/2024 15.7 (H)  11.5 - 14.5 % Final    Platelet Count 11/14/2024 250  150 - 400 K/uL Final    MPV 11/14/2024 10.5  9.4 - 12.4 fL Final    Neutrophils % 11/14/2024 84.0 (H)  53.0 - 65.0 % Final    Lymphocytes % 11/14/2024 7.6 (L)  27.0 - 41.0 % Final    Monocytes % 11/14/2024 6.3 (H)  2.0 - 6.0 % Final    Eosinophils % 11/14/2024 1.5  1.0 - 4.0 % Final    Basophils % 11/14/2024 0.3  0.0 - 1.0 % Final    Immature Granulocytes % 11/14/2024 0.3  0.0 - 0.4 % Final    nRBC, Auto  11/14/2024 0.0  <=0.0 % Final    Neutrophils, Abs 11/14/2024 12.17 (H)  1.80 - 7.70 K/uL Final    Lymphocytes, Absolute 11/14/2024 1.10  1.00 - 4.80 K/uL Final    Monocytes, Absolute 11/14/2024 0.91 (H)  0.00 - 0.80 K/uL Final    Eosinophils, Absolute 11/14/2024 0.22  0.00 - 0.50 K/uL Final    Basophils, Absolute 11/14/2024 0.05  0.00 - 0.20 K/uL Final    Immature Granulocytes, Absolute 11/14/2024 0.05 (H)  0.00 - 0.04 K/uL Final    nRBC, Absolute 11/14/2024 0.00  <=0.00 x10e3/uL Final    Diff Type 11/14/2024 Auto   Final     CT Abdomen Pelvis With IV Contrast NO Oral Contrast  Narrative: EXAMINATION:  CT ABDOMEN PELVIS WITH IV CONTRAST    CLINICAL HISTORY:  abd pain, leukocytosis;    TECHNIQUE:  Low dose axial images, sagittal and coronal reformations were obtained from the lung bases to the pubic symphysis following the IV administration of 100 mL of Isovue-370.    COMPARISON:  CT of the chest, abdomen, and pelvis performed 07/05/2023.    FINDINGS:  Lower chest: Atelectasis and/or scar at the visualized lung bases.    Liver: Suspected calcified granuloma.    Gallbladder and bile ducts: Status post cholecystectomy.    Pancreas: Unremarkable.    Spleen: Calcified granuloma.    Adrenals: Unremarkable.    Kidneys: Mild degree of cortical atrophy of both kidneys.  Subcentimeter hypodense lesions in both kidneys are too small to definitely characterize.  Nonspecific bilateral perinephric stranding.    Lymph nodes: Scattered mildly prominent number, but mostly small mesenteric and retroperitoneal lymph nodes are nonspecific and may be reactive in etiology.    Bowel and mesentery: Stomach appears distended with ingested material.  Question mild wall thickening with mucosal hyperemia of the distal body of the stomach and antrum.  Nonspecific intraluminal fluid within normal caliber loops of colon, as may be seen the setting diarrheal illness.  Colonic diverticulosis.Appendix not confidently identified.  No definite focal  pericecal inflammation.    Nonspecific partially calcified ovoid soft tissue mass measuring on the order of 25 mm at the anti mesenteric border of the proximal left colon stable compared to remote CT performed 04/20/2027, doubtful clinical significance.  Findings may reflect remote post infectious/inflammatory etiology.    Abdominal aorta: Nonaneurysmal.  Moderate to heavy atherosclerotic calcification.    Inferior vena cava: Unremarkable.    Free fluid or free air: None.    Pelvis: Unremarkable.    Body wall: Remote operative sequela suggested anterior abdominal wall.  Calcifications within the gluteal soft tissues could reflect injection granuloma.    Bones: No definite acute abnormality.  Relatively modest degenerative change of the spine and hips.  Impression: 1. Question mild wall thickening of the distal body and antrum of the stomach with mucosal hyperemia.  Correlation for signs/symptoms of gastritis recommended.  2. Additionally noted is nonspecific intraluminal fluid within normal caliber loops of colon, as may be seen the setting of diarrheal illness.  3. Additional details of chronic and incidental findings, as provided in the body of the report.    Electronically signed by: Graham Funez  Date:    11/14/2024  Time:    13:42      Assessment and Plan (including Health Maintenance)      Problem List  Smart Sets  Document Outside HM   :    Plan: continue current care        Health Maintenance Due   Topic Date Due    Pneumococcal Vaccines (Age 65+) (1 of 2 - PCV) Never done    TETANUS VACCINE  Never done    Shingles Vaccine (1 of 2) Never done    RSV Vaccine (Age 60+ and Pregnant patients) (1 - Risk 60-74 years 1-dose series) Never done    DEXA Scan  07/12/2024    COVID-19 Vaccine (3 - 2024-25 season) 09/01/2024    Diabetes Urine Screening  10/13/2024    Lipid Panel  10/13/2024    Eye Exam  12/06/2024    Hemoglobin A1c  12/14/2024    Mammogram  12/18/2024    Colorectal Cancer Screening  02/03/2025        Problem List Items Addressed This Visit          Cardiac/Vascular    Mixed hyperlipidemia (Chronic)    Relevant Orders    Lipid Panel       Endocrine    Type 2 diabetes mellitus with diabetic neuropathy, without long-term current use of insulin (Chronic)    Relevant Orders    Microalbumin/Creatinine Ratio, Urine       GI    Gastroesophageal reflux disease without esophagitis (Chronic)- may temporarily increase protonix to BID     Other Visit Diagnoses       Gastroenteritis    -  Primary    Chronic respiratory failure with hypoxia, on home O2 therapy                Health Maintenance Topics with due status: Not Due       Topic Last Completion Date    Foot Exam 06/14/2024    High Dose Statin 11/14/2024       Future Appointments   Date Time Provider Department Center   12/23/2024  1:40 PM [facility] MAMMO2 OB [facility]IC [facility]   12/23/2024  2:00 PM [facility] DEXA1 [facility]   10/30/2025  2:00 PM [facility] NURSE, [facility]            Signature:  MD ABELARDO Murcia NEAL [facility]  MEDICAL GROUP [facility] - [facility]ILY [facility]  [address] [place] [id number]  [phone]    Date of encounter: 11/22/24

## 2024-11-23 LAB
CHOLEST SERPL-MCNC: 128 MG/DL
CHOLEST/HDLC SERPL: 3.7 {RATIO}
HDLC SERPL-MCNC: 35 MG/DL (ref 35–60)
LDLC SERPL CALC-MCNC: 50 MG/DL
LDLC/HDLC SERPL: 1.4 {RATIO}
NONHDLC SERPL-MCNC: 93 MG/DL
TRIGL SERPL-MCNC: 214 MG/DL (ref 37–140)
VLDLC SERPL-MCNC: 43 MG/DL

## 2024-11-30 ENCOUNTER — EXTERNAL CHRONIC CARE MANAGEMENT (OUTPATIENT)
Dept: FAMILY MEDICINE | Facility: CLINIC | Age: 69
End: 2024-11-30
Payer: MEDICARE

## 2024-11-30 PROCEDURE — G0511 CCM/BHI BY RHC/FQHC 20MIN MO: HCPCS | Mod: ,,, | Performed by: FAMILY MEDICINE

## 2024-12-23 ENCOUNTER — HOSPITAL ENCOUNTER (OUTPATIENT)
Dept: RADIOLOGY | Facility: HOSPITAL | Age: 69
Discharge: HOME | End: 2024-12-23
Attending: FAMILY MEDICINE
Payer: MEDICARE

## 2024-12-23 VITALS — BODY MASS INDEX: 32.39 KG/M2 | HEIGHT: 62 IN | WEIGHT: 176 LBS

## 2024-12-23 DIAGNOSIS — Z12.31 OTHER SCREENING MAMMOGRAM: ICD-10-CM

## 2024-12-23 DIAGNOSIS — Z78.0 POST-MENOPAUSE: ICD-10-CM

## 2024-12-23 PROCEDURE — 77063 BREAST TOMOSYNTHESIS BI: CPT | Mod: TC

## 2024-12-23 PROCEDURE — 77063 BREAST TOMOSYNTHESIS BI: CPT | Mod: 26,,, | Performed by: RADIOLOGY

## 2024-12-23 PROCEDURE — 77067 SCR MAMMO BI INCL CAD: CPT | Mod: 26,,, | Performed by: RADIOLOGY

## 2024-12-23 PROCEDURE — 77080 DXA BONE DENSITY AXIAL: CPT | Mod: 26,,, | Performed by: NUCLEAR MEDICINE

## 2024-12-23 PROCEDURE — 77080 DXA BONE DENSITY AXIAL: CPT | Mod: TC

## 2024-12-26 ENCOUNTER — OFFICE VISIT (OUTPATIENT)
Dept: FAMILY MEDICINE | Facility: CLINIC | Age: 69
End: 2024-12-26
Payer: MEDICARE

## 2024-12-26 VITALS
TEMPERATURE: 98 F | OXYGEN SATURATION: 95 % | SYSTOLIC BLOOD PRESSURE: 136 MMHG | HEART RATE: 87 BPM | DIASTOLIC BLOOD PRESSURE: 85 MMHG | RESPIRATION RATE: 18 BRPM

## 2024-12-26 DIAGNOSIS — E03.9 ACQUIRED HYPOTHYROIDISM: Chronic | ICD-10-CM

## 2024-12-26 DIAGNOSIS — E11.40 TYPE 2 DIABETES MELLITUS WITH DIABETIC NEUROPATHY, WITHOUT LONG-TERM CURRENT USE OF INSULIN: Chronic | ICD-10-CM

## 2024-12-26 DIAGNOSIS — J06.9 UPPER RESPIRATORY TRACT INFECTION, UNSPECIFIED TYPE: Primary | ICD-10-CM

## 2024-12-26 DIAGNOSIS — K21.9 GASTROESOPHAGEAL REFLUX DISEASE WITHOUT ESOPHAGITIS: Chronic | ICD-10-CM

## 2024-12-26 DIAGNOSIS — G57.93 NEUROPATHY OF BOTH FEET: Chronic | ICD-10-CM

## 2024-12-26 DIAGNOSIS — Z20.828 VIRAL DISEASE EXPOSURE: ICD-10-CM

## 2024-12-26 DIAGNOSIS — J02.9 PHARYNGITIS, UNSPECIFIED ETIOLOGY: ICD-10-CM

## 2024-12-26 DIAGNOSIS — H65.192 OTHER ACUTE NONSUPPURATIVE OTITIS MEDIA OF LEFT EAR, RECURRENCE NOT SPECIFIED: ICD-10-CM

## 2024-12-26 LAB
CTP QC/QA: YES
MOLECULAR STREP A: NEGATIVE
POC MOLECULAR INFLUENZA A AGN: NEGATIVE
POC MOLECULAR INFLUENZA B AGN: NEGATIVE
SARS-COV-2 RDRP RESP QL NAA+PROBE: NEGATIVE

## 2024-12-26 PROCEDURE — 87635 SARS-COV-2 COVID-19 AMP PRB: CPT | Mod: RHCUB | Performed by: FAMILY MEDICINE

## 2024-12-26 PROCEDURE — 99214 OFFICE O/P EST MOD 30 MIN: CPT | Mod: ,,, | Performed by: FAMILY MEDICINE

## 2024-12-26 PROCEDURE — 87502 INFLUENZA DNA AMP PROBE: CPT | Mod: RHCUB | Performed by: FAMILY MEDICINE

## 2024-12-26 PROCEDURE — 87651 STREP A DNA AMP PROBE: CPT | Mod: RHCUB | Performed by: FAMILY MEDICINE

## 2024-12-26 RX ORDER — LEVOTHYROXINE SODIUM 50 UG/1
50 TABLET ORAL
Qty: 90 TABLET | Refills: 1 | Status: SHIPPED | OUTPATIENT
Start: 2024-12-26

## 2024-12-26 RX ORDER — AMOXICILLIN 875 MG/1
875 TABLET, FILM COATED ORAL 2 TIMES DAILY
Qty: 14 TABLET | Refills: 0 | Status: SHIPPED | OUTPATIENT
Start: 2024-12-26 | End: 2025-01-02

## 2024-12-26 RX ORDER — FAMOTIDINE 40 MG/1
40 TABLET, FILM COATED ORAL DAILY
Qty: 90 TABLET | Refills: 1 | Status: SHIPPED | OUTPATIENT
Start: 2024-12-26

## 2024-12-26 RX ORDER — GABAPENTIN 100 MG/1
CAPSULE ORAL
Qty: 360 CAPSULE | Refills: 1 | Status: SHIPPED | OUTPATIENT
Start: 2024-12-26

## 2024-12-26 NOTE — PROGRESS NOTES
Robert Gallegos MD   [facility]  MEDICAL GROUP [facility]  [address] [place] [address]  [phone]      PATIENT NAME: Rosalva Lopez  : 1955  DATE: 24  MRN: [id number]      Billing Provider: Robert Gallegos MD  Level of Service: SD OFFICE/OUTPT VISIT, EST, LEVL IV, 30-39 MIN  Patient PCP Information       Provider PCP Type    Robert Gallegos MD General            Reason for Visit / Chief Complaint: Otalgia (Right side), Sore Throat (Hurts to swallow.), and Sinus Problem (With drainage.)       Update PCP  Update Chief Complaint         History of Present Illness / Problem Focused Workflow     Rosalva Lopez presents to the clinic with Otalgia (Right side), Sore Throat (Hurts to swallow.), and Sinus Problem (With drainage.)       Symptoms started acutely yesterday.  Also c/o body aches. Has been exposed to flu recently and has had ? Covid exposure, as well.  Is allergic to tamiflu.  Needs several daily meds refilled.        Review of Systems     Review of Systems   Constitutional:  Negative for chills and fever.   HENT:  Positive for nasal congestion, ear pain, postnasal drip, rhinorrhea, sinus pressure/congestion and sore throat.    Gastrointestinal:  Negative for diarrhea, nausea and vomiting.   Musculoskeletal:  Positive for myalgias.        Medical / Social / Family History     Past Medical History:   Diagnosis Date    Depressive disorder     Diabetes     GERD (gastroesophageal reflux disease)     Hypothyroidism     Trigeminal neuralgia of left side of face        Past Surgical History:   Procedure Laterality Date    BREAST BIOPSY      CARPAL TUNNEL RELEASE Left     CHOLECYSTECTOMY      HYSTERECTOMY      INGUINAL HERNIA REPAIR      OOPHORECTOMY      TONSILLECTOMY      TYMPANOPLASTY         Social History    reports that she has never smoked. She has never been exposed to tobacco smoke. She has never used smokeless tobacco. She reports  that she does not drink alcohol and does not use drugs.   Social History     Tobacco Use    Smoking status: Never     Passive exposure: Never    Smokeless tobacco: Never   Substance Use Topics    Alcohol use: Never    Drug use: Never       Family History  Family History   Problem Relation Name Age of Onset    Heart disease Mother      Hypertension Father      Breast cancer Maternal Grandmother      Diabetes Brother         Medications and Allergies     Medications  No outpatient medications have been marked as taking for the 12/26/24 encounter (Office Visit) with Robert Gallegos MD.       Allergies  Review of patient's allergies indicates:   Allergen Reactions    Fluzone [id number] tri-whole     Sulfa (sulfonamide antibiotics)     Tamiflu [oseltamivir]        Physical Examination     Vitals:    12/26/24 1311   BP: 136/85   Pulse: 87   Resp: 18   Temp: 98.2 °F (36.8 °C)     Physical Exam  Vitals reviewed.   Constitutional:       General: She is not in acute distress.     Appearance: Normal appearance. She is ill-appearing. She is not toxic-appearing.   HENT:      Head: Normocephalic and atraumatic.      Ears:      Comments: R TM injected; some scarring on L TM from previous perforation/patch     Mouth/Throat:      Pharynx: Posterior oropharyngeal erythema present.   Eyes:      Extraocular Movements: Extraocular movements intact.      Conjunctiva/sclera: Conjunctivae normal.      Pupils: Pupils are equal, round, and reactive to light.   Cardiovascular:      Rate and Rhythm: Normal rate and regular rhythm.      Heart sounds: Normal heart sounds.   Pulmonary:      Effort: Pulmonary effort is normal.      Breath sounds: Normal breath sounds. No wheezing, rhonchi or rales.   Musculoskeletal:         General: Normal range of motion.      Cervical back: Normal range of motion.   Skin:     General: Skin is warm and dry.   Neurological:      General: No focal deficit present.      Mental Status: She is alert and oriented to  person, place, and time.   Psychiatric:         Mood and Affect: Mood normal.         Behavior: Behavior normal.        Office Visit on 12/26/2024   Component Date Value Ref Range Status    POC Rapid COVID 12/26/2024 Negative  Negative Final     Acceptable 12/26/2024 Yes   Final    POC Molecular Influenza A Ag 12/26/2024 Negative  Negative Final    POC Molecular Influenza B Ag 12/26/2024 Negative  Negative Final     Acceptable 12/26/2024 Yes   Final    Molecular Strep A, POC 12/26/2024 Negative  Negative Final     Acceptable 12/26/2024 Yes   Final     Discussed negative tests and possibility that she may have lase negative early on in illness.    Assessment and Plan (including Health Maintenance)      Problem List  Smart Sets  Document Outside HM   :    Plan: ensure rest and plenty of fluids.          Health Maintenance Due   Topic Date Due    TETANUS VACCINE  Never done    Pneumococcal Vaccines (Age 50+) (1 of 2 - PCV) Never done    Shingles Vaccine (1 of 2) Never done    RSV Vaccine (Age 60+ and Pregnant patients) (1 - Risk 60-74 years 1-dose series) Never done    COVID-19 Vaccine (3 - 2024-25 season) 09/01/2024    Eye Exam  12/06/2024    Hemoglobin A1c  12/14/2024    Colorectal Cancer Screening  02/03/2025       Problem List Items Addressed This Visit          Neuro    Neuropathy of both feet (Chronic)    Relevant Medications    gabapentin (NEURONTIN) 100 MG capsule       Endocrine    Type 2 diabetes mellitus with diabetic neuropathy, without long-term current use of insulin (Chronic)    Relevant Medications    gabapentin (NEURONTIN) 100 MG capsule    Other Relevant Orders    Hemoglobin A1C    Acquired hypothyroidism (Chronic)    Relevant Medications    levothyroxine (SYNTHROID) 50 MCG tablet       GI    Gastroesophageal reflux disease without esophagitis (Chronic)    Relevant Medications    famotidine (PEPCID) 40 MG tablet     Other Visit Diagnoses       Upper  respiratory tract infection, unspecified type    -  Primary    Viral disease exposure        Relevant Orders    POCT COVID-19 Rapid Screening (Completed)    POCT Influenza A/B Molecular (Completed)    POCT Strep A, Molecular (Completed)    Other acute nonsuppurative otitis media of left ear, recurrence not specified        Relevant Medications    amoxicillin (AMOXIL) 875 MG tablet    Pharyngitis, unspecified etiology        Relevant Medications    amoxicillin (AMOXIL) 875 MG tablet            Health Maintenance Topics with due status: Not Due       Topic Last Completion Date    Foot Exam 06/14/2024    High Dose Statin 11/14/2024    Lipid Panel 11/22/2024    Diabetes Urine Screening 12/05/2024    Mammogram 12/23/2024    DEXA Scan 12/23/2024       Future Appointments   Date Time Provider Department Center   10/30/2025  2:00 PM [facility]   12/30/2025  2:00 PM [facility] MAM[facility]            Signature:  MD ABELARDO Murcia NEAL [facility]  MEDICAL GROUP [facility] - FAMILY MEDICINE  [address] [place] [id number]  [phone]    Date of encounter: 12/26/24

## 2024-12-31 ENCOUNTER — EXTERNAL CHRONIC CARE MANAGEMENT (OUTPATIENT)
Dept: FAMILY MEDICINE | Facility: CLINIC | Age: 69
End: 2024-12-31
Payer: MEDICARE

## 2024-12-31 PROCEDURE — G0511 CCM/BHI BY RHC/FQHC 20MIN MO: HCPCS | Mod: ,,, | Performed by: FAMILY MEDICINE

## 2025-01-25 DIAGNOSIS — K21.9 GASTROESOPHAGEAL REFLUX DISEASE WITHOUT ESOPHAGITIS: Chronic | ICD-10-CM

## 2025-01-25 DIAGNOSIS — E89.40 ASYMPTOMATIC POSTSURGICAL MENOPAUSE: Chronic | ICD-10-CM

## 2025-01-25 DIAGNOSIS — F33.41 RECURRENT MAJOR DEPRESSIVE DISORDER, IN PARTIAL REMISSION: Chronic | ICD-10-CM

## 2025-01-28 DIAGNOSIS — Z12.11 SCREENING FOR COLON CANCER: Primary | ICD-10-CM

## 2025-01-28 RX ORDER — TRAZODONE HYDROCHLORIDE 50 MG/1
50 TABLET ORAL
Qty: 90 TABLET | Refills: 0 | Status: SHIPPED | OUTPATIENT
Start: 2025-01-28

## 2025-01-28 RX ORDER — PANTOPRAZOLE SODIUM 40 MG/1
40 TABLET, DELAYED RELEASE ORAL
Qty: 90 TABLET | Refills: 0 | Status: SHIPPED | OUTPATIENT
Start: 2025-01-28

## 2025-01-28 RX ORDER — ESTRADIOL 1 MG/1
1 TABLET ORAL
Qty: 90 TABLET | Refills: 0 | Status: SHIPPED | OUTPATIENT
Start: 2025-01-28

## 2025-01-31 ENCOUNTER — EXTERNAL CHRONIC CARE MANAGEMENT (OUTPATIENT)
Dept: FAMILY MEDICINE | Facility: CLINIC | Age: 70
End: 2025-01-31
Payer: MEDICARE

## 2025-01-31 PROCEDURE — G0511 CCM/BHI BY RHC/FQHC 20MIN MO: HCPCS | Mod: ,,, | Performed by: FAMILY MEDICINE

## 2025-02-18 DIAGNOSIS — Z12.11 SCREENING FOR COLON CANCER: Primary | ICD-10-CM

## 2025-02-19 RX ORDER — POLYETHYLENE GLYCOL 3350, SODIUM SULFATE ANHYDROUS, SODIUM BICARBONATE, SODIUM CHLORIDE, POTASSIUM CHLORIDE 236; 22.74; 6.74; 5.86; 2.97 G/4L; G/4L; G/4L; G/4L; G/4L
4 POWDER, FOR SOLUTION ORAL ONCE
Qty: 4000 ML | Refills: 0 | Status: SHIPPED | OUTPATIENT
Start: 2025-02-19 | End: 2025-02-19

## 2025-02-26 DIAGNOSIS — R56.9 CONVULSIONS, UNSPECIFIED CONVULSION TYPE: ICD-10-CM

## 2025-02-26 DIAGNOSIS — E78.2 MIXED HYPERLIPIDEMIA: Chronic | ICD-10-CM

## 2025-02-26 DIAGNOSIS — I10 ESSENTIAL HYPERTENSION: ICD-10-CM

## 2025-02-26 RX ORDER — VALPROIC ACID 250 MG/1
250 CAPSULE, LIQUID FILLED ORAL 2 TIMES DAILY
Qty: 180 CAPSULE | Refills: 0 | Status: SHIPPED | OUTPATIENT
Start: 2025-02-26

## 2025-02-26 RX ORDER — PRAVASTATIN SODIUM 40 MG/1
40 TABLET ORAL NIGHTLY
Qty: 90 TABLET | Refills: 3 | Status: SHIPPED | OUTPATIENT
Start: 2025-02-26

## 2025-02-26 RX ORDER — AMLODIPINE BESYLATE 10 MG/1
10 TABLET ORAL
Qty: 90 TABLET | Refills: 0 | Status: SHIPPED | OUTPATIENT
Start: 2025-02-26

## 2025-02-28 ENCOUNTER — EXTERNAL CHRONIC CARE MANAGEMENT (OUTPATIENT)
Dept: FAMILY MEDICINE | Facility: CLINIC | Age: 70
End: 2025-02-28
Payer: MEDICARE

## 2025-02-28 PROCEDURE — G0511 CCM/BHI BY RHC/FQHC 20MIN MO: HCPCS | Mod: ,,, | Performed by: FAMILY MEDICINE

## 2025-03-11 ENCOUNTER — OFFICE VISIT (OUTPATIENT)
Dept: FAMILY MEDICINE | Facility: CLINIC | Age: 70
End: 2025-03-11
Payer: MEDICARE

## 2025-03-11 VITALS — HEART RATE: 88 BPM | DIASTOLIC BLOOD PRESSURE: 70 MMHG | SYSTOLIC BLOOD PRESSURE: 130 MMHG

## 2025-03-11 DIAGNOSIS — E11.40 TYPE 2 DIABETES MELLITUS WITH DIABETIC NEUROPATHY, WITHOUT LONG-TERM CURRENT USE OF INSULIN: Chronic | ICD-10-CM

## 2025-03-11 DIAGNOSIS — S60.561A INSECT BITE OF RIGHT HAND, INITIAL ENCOUNTER: Primary | ICD-10-CM
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
## 2025-03-11 DIAGNOSIS — J61 ASBESTOSIS: Chronic | ICD-10-CM

## 2025-03-11 DIAGNOSIS — R56.9 CONVULSIONS, UNSPECIFIED CONVULSION TYPE: Chronic | ICD-10-CM

## 2025-03-11 DIAGNOSIS — N18.32 STAGE 3B CHRONIC KIDNEY DISEASE: ICD-10-CM

## 2025-03-11 LAB
EST. AVERAGE GLUCOSE BLD GHB EST-MCNC: 180 MG/DL
HBA1C MFR BLD HPLC: 7.9 %

## 2025-03-11 PROCEDURE — 99213 OFFICE O/P EST LOW 20 MIN: CPT | Mod: ,,, | Performed by: FAMILY MEDICINE

## 2025-03-11 PROCEDURE — 83036 HEMOGLOBIN GLYCOSYLATED A1C: CPT | Mod: ,,, | Performed by: CLINICAL MEDICAL LABORATORY

## 2025-03-11 RX ORDER — LEVOCETIRIZINE DIHYDROCHLORIDE 5 MG/1
5 TABLET, FILM COATED ORAL NIGHTLY
Qty: 7 TABLET | Refills: 0 | Status: SHIPPED | OUTPATIENT
Start: 2025-03-11

## 2025-03-11 NOTE — PROGRESS NOTES
Robert Gallegos MD   [facility]  [facility] [address] [address]  [phone]      PATIENT NAME: Rosalva Lopez  : 1955  DATE: 3/11/25  MRN: [id number]      Billing Provider: Robert Gallegos MD  Level of Service:   Patient PCP Information       Provider PCP Type    Robert Gallegos MD General            Reason for Visit / Chief Complaint: Insect Bite       Update PCP  Update Chief Complaint         History of Present Illness / Problem Focused Workflow     Rosalva Lopez presents to the clinic with Insect Bite       Was bitten by ants while doing gardening.  Felt lightheaded afterward.        Review of Systems     Review of Systems   Integumentary:  Positive for mole/lesion (multiple ant bites).   Neurological:  Positive for light-headedness (has resolved).        Medical / Social / Family History     Past Medical History:   Diagnosis Date    Depressive disorder     Diabetes     GERD (gastroesophageal reflux disease)     Hypothyroidism     Stage 3b chronic kidney disease 2023    Trigeminal neuralgia of left side of face        Past Surgical History:   Procedure Laterality Date    BREAST BIOPSY      CARPAL TUNNEL RELEASE Left     CHOLECYSTECTOMY      HYSTERECTOMY      INGUINAL HERNIA REPAIR      OOPHORECTOMY      TONSILLECTOMY      TYMPANOPLASTY         Social History    reports that she has never smoked. She has never been exposed to tobacco smoke. She has never used smokeless tobacco. She reports that she does not drink alcohol and does not use drugs.   Social History[1]    Family History  Family History   Problem Relation Name Age of Onset    Heart disease Mother      Hypertension Father      Breast cancer Maternal Grandmother      Diabetes Brother         Medications and Allergies     Medications  No outpatient medications have been marked as taking for the 3/11/25 encounter (Office Visit) with Robert Gallegos,  MD.       Allergies  Review of patient's allergies indicates:   Allergen Reactions    Fluzone [id number] tri-whole     Sulfa (sulfonamide antibiotics)     Tamiflu [oseltamivir]        Physical Examination     Vitals:    03/11/25 1247   BP: 130/70   Pulse: 88     Physical Exam  Vitals reviewed.   Constitutional:       Appearance: Normal appearance.   HENT:      Head: Normocephalic and atraumatic.   Eyes:      Extraocular Movements: Extraocular movements intact.      Conjunctiva/sclera: Conjunctivae normal.      Pupils: Pupils are equal, round, and reactive to light.   Cardiovascular:      Rate and Rhythm: Normal rate and regular rhythm.      Heart sounds: Normal heart sounds.   Pulmonary:      Effort: Pulmonary effort is normal.      Breath sounds: Normal breath sounds.   Musculoskeletal:         General: Normal range of motion.      Cervical back: Normal range of motion.   Skin:     General: Skin is warm and dry.      Findings: Lesion (she has numerous antbites on right hand and foot) present.   Neurological:      General: No focal deficit present.      Mental Status: She is alert and oriented to person, place, and time.   Psychiatric:         Mood and Affect: Mood normal.         Behavior: Behavior normal.          Component  Ref Range & Units (hover) 9 mo ago  (6/14/24) 1 yr ago  (12/14/23) 1 yr ago  (7/5/23) 2 yr ago  (2/28/23) 2 yr ago  (11/18/22) 2 yr ago  (5/13/22) 3 yr ago  (9/23/21)   Hemoglobin A1C 7.3 High  6.3 CM 6.7 High  CM 7.2 High  CM 8.1 High  CM 10.7 High  CM 10.8 High  CM   Comment:  Normal:               <5.7%  Pre-Diabetic:       5.7% to 6.4%  Diabetic:             >6.4%  Diabetic Goal:     <7%   Estimated Average Glucose 163 134 137 154 184 270 274   Resulting Agency [facility]             Specimen Collected: 06/14/24 14:09 CDT Last Resulted: 06/15/24  12:59 CDT       Assessment and Plan (including Health Maintenance)      Problem List  Smart Sets  Document Outside HM   :    Plan: will get A1C today.  It has already been ordered        Health Maintenance Due   Topic Date Due    TETANUS VACCINE  Never done    Pneumococcal Vaccines (Age 50+) (1 of 2 - PCV) Never done    Shingles Vaccine (1 of 2) Never done    RSV Vaccine (Age 60+ and Pregnant patients) (1 - Risk 60-74 years 1-dose series) Never done    COVID-19 Vaccine (3 - 2024-25 season) 09/01/2024    Diabetic Eye Exam  12/06/2024    Hemoglobin A1c  12/14/2024    Colorectal Cancer Screening  02/03/2025       Problem List Items Addressed This Visit          Neuro    Convulsions, unspecified convulsion type       Pulmonary    Asbestosis       Renal/    RESOLVED: Stage 3b chronic kidney disease       Endocrine    Type 2 diabetes mellitus with diabetic neuropathy, without long-term current use of insulin (Chronic)    Relevant Orders    Hemoglobin A1C     Other Visit Diagnoses         Insect bite of right hand, initial encounter    -  Primary    Relevant Medications    levocetirizine (XYZAL) 5 MG tablet            Health Maintenance Topics with due status: Not Due       Topic Last Completion Date    Foot Exam 06/14/2024    Lipid Panel 11/22/2024    Diabetes Urine Screening 12/05/2024    Mammogram 12/23/2024    DEXA Scan 12/23/2024    High Dose Statin 02/26/2025       Future Appointments   Date Time Provider Department Center   6/30/2025  9:00 AM [facility] GI ROOM 03 [facility] [place] [facility]   10/30/2025  2:00 PM [facility] NURSE, [place] [facility]   12/30/2025  2:00 PM [facility] [place]            Signature:  Robert Gallegos MD  [facility]  MEDICAL GROUP OF [facility] FAMILY MEDIC[facility]  [address] [place] [address]  [phone]    Date of encounter: 3/11/25         [1]   Social History  Tobacco Use    Smoking status: Never     Passive  exposure: Never    Smokeless tobacco: Never   Substance Use Topics    Alcohol use: Never    Drug use: Never

## 2025-03-31 ENCOUNTER — EXTERNAL CHRONIC CARE MANAGEMENT (OUTPATIENT)
Dept: FAMILY MEDICINE | Facility: CLINIC | Age: 70
End: 2025-03-31
Payer: MEDICARE

## 2025-03-31 PROCEDURE — G0511 CCM/BHI BY RHC/FQHC 20MIN MO: HCPCS | Mod: ,,, | Performed by: FAMILY MEDICINE

## 2025-04-02 ENCOUNTER — OFFICE VISIT (OUTPATIENT)
Dept: FAMILY MEDICINE | Facility: CLINIC | Age: 70
End: 2025-04-02
Payer: MEDICARE

## 2025-04-02 VITALS
SYSTOLIC BLOOD PRESSURE: 112 MMHG | BODY MASS INDEX: 33.9 KG/M2 | WEIGHT: 184.19 LBS | OXYGEN SATURATION: 94 % | DIASTOLIC BLOOD PRESSURE: 69 MMHG | HEART RATE: 80 BPM | HEIGHT: 62 IN

## 2025-04-02 DIAGNOSIS — E11.40 TYPE 2 DIABETES MELLITUS WITH DIABETIC NEUROPATHY, WITHOUT LONG-TERM CURRENT USE OF INSULIN: Chronic | ICD-10-CM

## 2025-04-02 DIAGNOSIS — E11.9 ENCOUNTER FOR DIABETIC FOOT EXAM: ICD-10-CM

## 2025-04-02 DIAGNOSIS — I10 ESSENTIAL HYPERTENSION: ICD-10-CM

## 2025-04-02 DIAGNOSIS — R60.0 LOCALIZED EDEMA: ICD-10-CM

## 2025-04-02 DIAGNOSIS — G57.93 NEUROPATHY OF BOTH FEET: Primary | Chronic | ICD-10-CM

## 2025-04-02 DIAGNOSIS — H92.09 OTALGIA, UNSPECIFIED LATERALITY: ICD-10-CM

## 2025-04-02 PROCEDURE — 99214 OFFICE O/P EST MOD 30 MIN: CPT | Mod: ,,, | Performed by: FAMILY MEDICINE

## 2025-04-02 RX ORDER — FUROSEMIDE 20 MG/1
20 TABLET ORAL DAILY PRN
Qty: 30 TABLET | Refills: 1 | Status: SHIPPED | OUTPATIENT
Start: 2025-04-02 | End: 2026-04-02

## 2025-04-02 NOTE — PROGRESS NOTES
Robert Gallegos MD   [facility]  MEDICAL GROUP [facility]IC[facility]  [address] [place] [address] [place] [address]  [phone]      PATIENT NAME: Rosalva Lopez  : 1955  DATE: 25  MRN: [id number]      Billing Provider: Robert Gallegos MD  Level of Service: DC OFFICE/OUTPT VISIT, EST, LEVL IV, 30-39 MIN  Patient PCP Information       Provider PCP Type    Robert Gallegos MD General            Reason for Visit / Chief Complaint: Edema (Lower ext. sandra), diabetic shoes, and Health Maintenance (TETANUS VACCINE Never done/Pneumococcal Vaccines (Age 50+)(1 of 2 - PCV) Never done/Shingles Vaccine(1 of 2) Never done/RSV Vaccine (Age 60+ and Pregnant patients)(1 - Risk 60-74 years 1-dose series) Never done/COVID-19 Vaccine(3 - 2024-25 season) due on 2024/Diabetic Eye Exam due on 2024/Colorectal Cancer Screening due on 2025/addressed)       Update PCP  Update Chief Complaint         History of Present Illness / Problem Focused Workflow     Rosalva Lopez presents to the clinic with Edema (Lower ext. sandra), diabetic shoes, and Health Maintenance (TETANUS VACCINE Never done/Pneumococcal Vaccines (Age 50+)(1 of 2 - PCV) Never done/Shingles Vaccine(1 of 2) Never done/RSV Vaccine (Age 60+ and Pregnant patients)(1 - Risk 60-74 years 1-dose series) Never done/COVID-19 Vaccine(3 - 2024-25 season) due on 2024/Diabetic Eye Exam due on 2024/Colorectal Cancer Screening due on 2025/addressed)       Needs diabetic foot exam.  Has been having some pedal edema.        Review of Systems     Review of Systems   Constitutional:  Negative for activity change, chills and fever.   HENT:  Positive for ear pain. Negative for sore throat.    Eyes:  Negative for pain.   Respiratory:  Negative for cough, chest tightness and shortness of breath.    Cardiovascular:  Positive for leg swelling. Negative for chest pain and palpitations.   Gastrointestinal:  Negative for  abdominal pain.   Neurological:  Negative for dizziness, syncope and weakness.   Psychiatric/Behavioral:  Negative for confusion.         Medical / Social / Family History     Past Medical History:   Diagnosis Date    Depressive disorder     Diabetes     GERD (gastroesophageal reflux disease)     Hypothyroidism     Stage 3b chronic kidney disease 02/28/2023    Trigeminal neuralgia of left side of face        Past Surgical History:   Procedure Laterality Date    BREAST BIOPSY      CARPAL TUNNEL RELEASE Left     CHOLECYSTECTOMY      HYSTERECTOMY      INGUINAL HERNIA REPAIR      OOPHORECTOMY      TONSILLECTOMY      TYMPANOPLASTY         Social History    reports that she has never smoked. She has never been exposed to tobacco smoke. She has never used smokeless tobacco. She reports that she does not drink alcohol and does not use drugs.   Social History[1]    Family History  Family History   Problem Relation Name Age of Onset    Heart disease Mother      Hypertension Father      Breast cancer Maternal Grandmother      Diabetes Brother         Medications and Allergies     Medications  Outpatient Medications Marked as Taking for the 4/2/25 encounter (Office Visit) with Robert Gallegos MD   Medication Sig Dispense Refill    acetaminophen (TYLENOL) 325 MG tablet Take 2 tablets (650 mg total) by mouth every 8 (eight) hours as needed.  0    albuterol (VENTOLIN HFA) 90 mcg/actuation inhaler Inhale 2 puffs into the lungs every 6 (six) hours as needed for Wheezing or Shortness of Breath. Rescue 8 g 1    amLODIPine (NORVASC) 10 MG tablet Take 1 tablet by mouth once daily 90 tablet 0    aspirin (ECOTRIN) 81 MG EC tablet Take 81 mg by mouth once daily.      diclofenac sodium (VOLTAREN) 1 % Gel Apply 2 g topically 4 (four) times daily. 100 g 0    ergocalciferol (ERGOCALCIFEROL) 50,000 unit Cap Take 5,000 Units by mouth once daily.      estradioL (ESTRACE) 1 MG tablet Take 1 tablet by mouth once daily 90 tablet 0    famotidine  (PEPCID) 40 MG tablet Take 1 tablet (40 mg total) by mouth Daily. 90 tablet 1    gabapentin (NEURONTIN) 100 MG capsule Take two capsules by mouth twice daily. 360 capsule 1    glipiZIDE (GLUCOTROL) 5 MG tablet Take 1 tablet (5 mg total) by mouth 2 (two) times daily with meals. 180 tablet 2    latanoprost 0.005 % ophthalmic solution Place 1 drop into both eyes every evening.      levocetirizine (XYZAL) 5 MG tablet Take 1 tablet (5 mg total) by mouth every evening. 7 tablet 0    levothyroxine (SYNTHROID) 50 MCG tablet Take 1 tablet (50 mcg total) by mouth before breakfast. 90 tablet 1    ondansetron (ZOFRAN-ODT) 4 MG TbDL Take 1 tablet (4 mg total) by mouth every 8 (eight) hours as needed (n/v). 15 tablet 0    pantoprazole (PROTONIX) 40 MG tablet Take 1 tablet by mouth once daily 90 tablet 0    pravastatin (PRAVACHOL) 40 MG tablet TAKE 1 TABLET BY MOUTH ONCE DAILY IN THE EVENING 90 tablet 3    traZODone (DESYREL) 50 MG tablet Take 1 tablet by mouth once daily 90 tablet 0    valproic acid (DEPAKENE) 250 mg capsule Take 1 capsule by mouth twice daily 180 capsule 0       Allergies  Review of patient's allergies indicates:   Allergen Reactions    Fluzone [id number] tri-whole     Sulfa (sulfonamide antibiotics)     Tamiflu [oseltamivir]        Physical Examination     Vitals:    04/02/25 1536   BP: 112/69   Pulse: 80     Physical Exam  Vitals reviewed.   Constitutional:       Appearance: Normal appearance.   HENT:      Head: Normocephalic and atraumatic.      Right Ear: Tympanic membrane and ear canal normal.      Left Ear: Tympanic membrane and ear canal normal.   Eyes:      Extraocular Movements: Extraocular movements intact.      Conjunctiva/sclera: Conjunctivae normal.      Pupils: Pupils are equal, round, and reactive to light.   Cardiovascular:      Rate and Rhythm: Normal rate and regular rhythm.      Pulses:           Dorsalis pedis pulses are 2+ on the right side and 2+ on the left side.        Posterior tibial  pulses are 2+ on the right side and 2+ on the left side.      Heart sounds: Normal heart sounds.   Pulmonary:      Effort: Pulmonary effort is normal.      Breath sounds: Normal breath sounds.   Musculoskeletal:         General: Normal range of motion.      Cervical back: Normal range of motion.        Feet:    Feet:      Right foot:      Protective Sensation: 10 sites tested.  8 sites sensed.      Skin integrity: Skin integrity normal.      Toenail Condition: Right toenails are normal.      Left foot:      Protective Sensation: 10 sites tested.  10 sites sensed.      Skin integrity: Skin integrity normal.      Toenail Condition: Left toenails are normal.      Comments: Bilateral pedal edema  Skin:     General: Skin is warm and dry.   Neurological:      General: No focal deficit present.      Mental Status: She is alert and oriented to person, place, and time.   Psychiatric:         Mood and Affect: Mood normal.         Behavior: Behavior normal.        Component  Ref Range & Units (hover) 3 wk ago  (3/11/25) 9 mo ago  (6/14/24) 1 yr ago  (12/14/23) 1 yr ago  (7/5/23) 2 yr ago  (2/28/23) 2 yr ago  (11/18/22) 2 yr ago  (5/13/22)   Hemoglobin A1C 7.9 High  7.3 High  R, CM 6.3 R, CM 6.7 High  R, CM 7.2 High  R, CM 8.1 High  R, CM 10.7 High  R, CM   Comment:  Normal:               <5.7%  Pre-Diabetic:       5.7% to 6.4%  Diabetic:             >6.4%  Diabetic Goal:     <7%   Estimated Average Glucose 180 163 134 137 154 184 270   Resulting Agency [facility]ACH [facility]             Specimen Collected: 03/11/25 11:23 CDT Last Resulted: 03/11/25 16:51 CDT     Component  Ref Range & Units (hover) 3 mo ago 1 yr ago   Creatinine, Urine 75 97 R   Microalbumin <0.5 <0.5 R   Microalbumin/Creatinine Ratio  <5.2 R   Comment: Unable to calculate   Resulting Agency [facility]  LAB             Specimen Collected: 12/05/24 12:20 CST Last Resulted: 12/05/24 18:24 CST         Component  Ref Range & Units (hover) 4 mo ago  (11/14/24) 9 mo ago  (6/27/24) 1 yr ago  (7/10/23) 1 yr ago  (7/9/23) 1 yr ago  (7/8/23) 1 yr ago  (7/7/23) 1 yr ago  (7/6/23)   Sodium 141 138 142 141 141 140 141   Potassium 3.8 4.6 3.9 3.9 3.9 4.4 4.3   Chloride 105 103 105 104 104 103 105   CO2 24 24 R 29 R 28 R 28 R 31 R 30 R   Anion Gap 16 16 12 13 13 10 10   Glucose 207 High  130 High  R 198 High  R 223 High  R 185 High  R 138 High  R 153 High  R   BUN 26 High  26 High  R 13 R 13 R 14 R 12 R 16 R   Creatinine 1.21 High  1.59 High  1.30 High  1.28 High  1.22 High  1.22 High  1.38 High    BUN/Creatinine Ratio 21 High  16 10 10 11 10 12   Calcium 9.1 10.0 R 8.9 R 8.9 R 8.9 R 8.8 R 8.8 R   Total Protein 7.5         Albumin 3.5         Globulin 4.0         A/G Ratio 0.9         Bilirubin, Total 0.3         Alk Phos 86         ALT 27         AST 37 High          eGFR 48 Low  35 Low  45 Low  45 Low  48 Low  48 Low  42 Low    Resulting Agency [facility] OUTREACH [facility]             Specimen Collected: 11/14/24 12:03 CST Last Resulted: 11/14/24 12:29 CST       Assessment and Plan (including Health Maintenance)      Problem List  Smart Sets  Document Outside HM   :    Plan: lasix prn edema        Health Maintenance Due   Topic Date Due    TETANUS VACCINE  Never done    Pneumococcal Vaccines (Age 50+) (1 of 2 - PCV) Never done    Shingles Vaccine (1 of 2) Never done    RSV Vaccine (Age 60+ and Pregnant patients) (1 - Risk 60-74 years 1-dose series) Never done    COVID-19 Vaccine (3 - 2024-25 season) 09/01/2024    Diabetic Eye Exam  12/06/2024    Colorectal Cancer Screening  02/03/2025       Problem List Items Addressed This Visit          Neuro    Neuropathy of both feet - Primary (Chronic)       Cardiac/Vascular    Essential hypertension       Endocrine    Type 2 diabetes mellitus with  diabetic neuropathy, without long-term current use of insulin (Chronic)     Other Visit Diagnoses         Otalgia, unspecified laterality          Localized edema        Relevant Medications    furosemide (LASIX) 20 MG tablet      Encounter for diabetic foot exam                Health Maintenance Topics with due status: Not Due       Topic Last Completion Date    Lipid Panel 11/22/2024    Diabetes Urine Screening 12/05/2024    Mammogram 12/23/2024    DEXA Scan 12/23/2024    Hemoglobin A1c 03/11/2025    High Dose Statin 04/02/2025    Foot Exam 04/02/2025       Future Appointments   Date Time Provider Department Center   6/30/2025  9:00 AM [facility] GI ROOM 03 [facility] ENDO [place] [facility]   10/30/2025  2:00 PM [facility] NURSE, [place] [facility]   12/30/2025  2:00 PM [facility] [place]            Signature:  Robert Gallegos MD  [facility]  MEDICAL GROUP [facility] FAMILY MEDICINE  [address] [place] [address]  [phone]    Date of encounter: 4/2/25         [1]   Social History  Tobacco Use    Smoking status: Never     Passive exposure: Never    Smokeless tobacco: Never   Substance Use Topics    Alcohol use: Never    Drug use: Never

## 2025-04-07 ENCOUNTER — HOSPITAL ENCOUNTER (EMERGENCY)
Facility: HOSPITAL | Age: 70
Discharge: HOME | End: 2025-04-07
Payer: MEDICARE

## 2025-04-07 VITALS
WEIGHT: 184 LBS | TEMPERATURE: 98 F | OXYGEN SATURATION: 96 % | SYSTOLIC BLOOD PRESSURE: 113 MMHG | HEIGHT: 62 IN | HEART RATE: 86 BPM | RESPIRATION RATE: 22 BRPM | BODY MASS INDEX: 33.86 KG/M2 | DIASTOLIC BLOOD PRESSURE: 78 MMHG

## 2025-04-07 DIAGNOSIS — M79.10 MYALGIA: ICD-10-CM

## 2025-04-07 DIAGNOSIS — H93.8X1 EAR PRESSURE, RIGHT: ICD-10-CM

## 2025-04-07 DIAGNOSIS — R05.9 COUGH: ICD-10-CM

## 2025-04-07 DIAGNOSIS — R09.81 NASAL CONGESTION: ICD-10-CM

## 2025-04-07 DIAGNOSIS — J06.9 UPPER RESPIRATORY TRACT INFECTION, UNSPECIFIED TYPE: Primary | ICD-10-CM

## 2025-04-07 LAB
INFLUENZA A MOLECULAR (OHS): NEGATIVE
INFLUENZA B MOLECULAR (OHS): NEGATIVE
SARS-COV-2 RDRP RESP QL NAA+PROBE: NEGATIVE

## 2025-04-07 PROCEDURE — 96372 THER/PROPH/DIAG INJ SC/IM: CPT

## 2025-04-07 PROCEDURE — 87502 INFLUENZA DNA AMP PROBE: CPT

## 2025-04-07 PROCEDURE — 87635 SARS-COV-2 COVID-19 AMP PRB: CPT

## 2025-04-07 PROCEDURE — 94640 AIRWAY INHALATION TREATMENT: CPT

## 2025-04-07 PROCEDURE — 94761 N-INVAS EAR/PLS OXIMETRY MLT: CPT

## 2025-04-07 PROCEDURE — 99284 EMERGENCY DEPT VISIT MOD MDM: CPT | Mod: GF

## 2025-04-07 PROCEDURE — 25000242 PHARM REV CODE 250 ALT 637 W/ HCPCS

## 2025-04-07 PROCEDURE — 63600175 PHARM REV CODE 636 W HCPCS

## 2025-04-07 PROCEDURE — 99284 EMERGENCY DEPT VISIT MOD MDM: CPT | Mod: 25

## 2025-04-07 RX ORDER — DEXAMETHASONE SODIUM PHOSPHATE 4 MG/ML
4 INJECTION, SOLUTION INTRA-ARTICULAR; INTRALESIONAL; INTRAMUSCULAR; INTRAVENOUS; SOFT TISSUE
Status: COMPLETED | OUTPATIENT
Start: 2025-04-07 | End: 2025-04-07

## 2025-04-07 RX ORDER — BENZONATATE 100 MG/1
100 CAPSULE ORAL 3 TIMES DAILY PRN
Qty: 20 CAPSULE | Refills: 0 | Status: SHIPPED | OUTPATIENT
Start: 2025-04-07 | End: 2025-04-17

## 2025-04-07 RX ORDER — IPRATROPIUM BROMIDE AND ALBUTEROL SULFATE 2.5; .5 MG/3ML; MG/3ML
3 SOLUTION RESPIRATORY (INHALATION)
Status: COMPLETED | OUTPATIENT
Start: 2025-04-07 | End: 2025-04-07

## 2025-04-07 RX ORDER — CEFTRIAXONE 1 G/1
1 INJECTION, POWDER, FOR SOLUTION INTRAMUSCULAR; INTRAVENOUS
Status: COMPLETED | OUTPATIENT
Start: 2025-04-07 | End: 2025-04-07

## 2025-04-07 RX ORDER — AMOXICILLIN AND CLAVULANATE POTASSIUM 875; 125 MG/1; MG/1
1 TABLET, FILM COATED ORAL 2 TIMES DAILY
Qty: 20 TABLET | Refills: 0 | Status: SHIPPED | OUTPATIENT
Start: 2025-04-07 | End: 2025-04-17

## 2025-04-07 RX ADMIN — IPRATROPIUM BROMIDE AND ALBUTEROL SULFATE 3 ML: 2.5; .5 SOLUTION RESPIRATORY (INHALATION) at 06:04

## 2025-04-07 RX ADMIN — CEFTRIAXONE SODIUM 1 G: 1 INJECTION, POWDER, FOR SOLUTION INTRAMUSCULAR; INTRAVENOUS at 06:04

## 2025-04-07 RX ADMIN — DEXAMETHASONE SODIUM PHOSPHATE 4 MG: 4 INJECTION, SOLUTION INTRA-ARTICULAR; INTRALESIONAL; INTRAMUSCULAR; INTRAVENOUS; SOFT TISSUE at 06:04

## 2025-04-07 NOTE — ED PROVIDER NOTES
Encounter Date: 4/7/2025       History     Chief Complaint   Patient presents with    Cough    Nasal Congestion     Onset 5 days ago.     70-year-old female presents to the ED for evaluation of cough productive of yellow sputum as well as runny nose over the past 5 days.  She reports that she has an appointment scheduled with the PCP for this coming Wednesday but that her symptoms have not improved and presented to the ED for evaluation.  Prostate troponins only other complaint is back pain present during coughing episodes.  She denies chest pain, shortness of breath, difficulty breathing, abdominal pain, vomiting, or any other complaints at this time.  Blood pressure 113/78, temperature 97.8°, heart rate 90, respirations 18, and oxygen saturation 95% on room air.  Speaking in full sentences able to provide an adequate history.  She appears in no immediate distress    The history is provided by the patient and the spouse.     Review of patient's allergies indicates:   Allergen Reactions    Flu vaccine ts 2011-12(18 yr+)     Fluzone [id number] tri-whole     Sulfa (sulfonamide antibiotics)     Tamiflu [oseltamivir]      Past Medical History:   Diagnosis Date    Depressive disorder     Diabetes     GERD (gastroesophageal reflux disease)     Hypothyroidism     Stage 3b chronic kidney disease 02/28/2023    Trigeminal neuralgia of left side of face      Past Surgical History:   Procedure Laterality Date    BREAST BIOPSY      CARPAL TUNNEL RELEASE Left     CHOLECYSTECTOMY      HYSTERECTOMY      INGUINAL HERNIA REPAIR      OOPHORECTOMY      TONSILLECTOMY      TYMPANOPLASTY       Family History   Problem Relation Name Age of Onset    Heart disease Mother      Hypertension Father      Breast cancer Maternal Grandmother      Diabetes Brother       Social History[1]  Review of Systems   Constitutional:  Negative for activity change, appetite change, chills and fever.   HENT:  Positive for congestion, rhinorrhea and sore throat.     Eyes: Negative.    Respiratory:  Positive for cough. Negative for shortness of breath.    Cardiovascular:  Negative for chest pain and palpitations.   Gastrointestinal:  Negative for abdominal pain, nausea and vomiting.   Endocrine: Negative.    Genitourinary:  Negative for dysuria and frequency.   Musculoskeletal:  Negative for arthralgias, back pain, neck pain and neck stiffness.   Skin:  Negative for color change, pallor and rash.   Allergic/Immunologic: Negative.    Neurological:  Negative for dizziness, syncope, weakness and headaches.   Hematological:  Does not bruise/bleed easily.   Psychiatric/Behavioral:  Negative for confusion. The patient is not nervous/anxious.    All other systems reviewed and are negative.      Physical Exam     Initial Vitals [04/07/25 1750]   BP Pulse Resp Temp SpO2   113/78 90 18 97.8 °F (36.6 °C) 95 %      MAP       --         Physical Exam    Nursing note and vitals reviewed.  Constitutional: She appears well-developed and well-nourished. She is not diaphoretic. She is active and cooperative.  Non-toxic appearance. No distress.   HENT:   Head: Normocephalic and atraumatic.   Right Ear: External ear and ear canal normal. Tympanic membrane is injected. Tympanic membrane is not erythematous.   Left Ear: External ear and ear canal normal. Tympanic membrane is injected. Tympanic membrane is not erythematous.   Nose: Right sinus exhibits no maxillary sinus tenderness and no frontal sinus tenderness. Left sinus exhibits no maxillary sinus tenderness and no frontal sinus tenderness. Mouth/Throat: Uvula is midline and mucous membranes are normal. Posterior oropharyngeal erythema (mild) present. No oropharyngeal exudate or tonsillar abscesses.   Eyes: Conjunctivae and EOM are normal. Pupils are equal, round, and reactive to light.   Neck: Neck supple.   Normal range of motion.   Full passive range of motion without pain.     Cardiovascular:  Normal rate, regular rhythm, normal heart  sounds and normal pulses.           Pulmonary/Chest: Effort normal and breath sounds normal. No tachypnea. No respiratory distress. She has no decreased breath sounds. She has no wheezes. She has no rhonchi. She has no rales. She exhibits no tenderness.   Abdominal: Abdomen is soft. Bowel sounds are normal. She exhibits no distension. There is no abdominal tenderness. There is no rebound and no guarding.   Musculoskeletal:         General: Normal range of motion.        Arms:       Cervical back: Full passive range of motion without pain, normal range of motion and neck supple.     Neurological: She is alert and oriented to person, place, and time. She has normal strength. GCS score is 15. GCS eye subscore is 4. GCS verbal subscore is 5. GCS motor subscore is 6.   Skin: Skin is warm and dry. Capillary refill takes less than 2 seconds.   Psychiatric: She has a normal mood and affect. Her behavior is normal. Judgment and thought content normal.         Medical Screening Exam   See Full Note    ED Course   Procedures  Labs Reviewed   INFLUENZA A & B BY MOLECULAR - Normal       Result Value    INFLUENZA A MOLECULAR Negative      INFLUENZA B MOLECULAR  Negative     SARS-COV-2 RNA AMPLIFICATION, QUAL - Normal    SARS COV-2 Molecular Negative      Narrative:     Negative SARS-CoV results should not be used as the sole basis for treatment or patient management decisions; negative results should be considered in the context of a patient's recent exposures, history and the presene of clinical signs and symptoms consistent with COVID-19.  Negative results should be treated as presumptive and confirmed by molecular assay, if necessary for patient management.          Imaging Results              X-Ray Chest PA And Lateral (Final result)  Result time 04/07/25 19:04:36      Final result by Osmel Pearce MD (04/07/25 19:04:36)                   Impression:      No acute intrathoracic process.      Electronically signed by: Osmel  MD Portia  Date:    04/07/2025  Time:    19:04               Narrative:    EXAMINATION:  XR CHEST PA AND LATERAL    CLINICAL HISTORY:  Cough, unspecified    TECHNIQUE:  PA and lateral views of the chest were performed.    COMPARISON:  07/05/2023.    FINDINGS:  The trachea is unremarkable.  There are calcifications of the aortic knob.  The cardiomediastinal silhouette is within normal limits.  There are no pleural effusions.  There is no evidence of a pneumothorax.  There is no evidence of pneumomediastinum.  No airspace opacity is present.    There is no evidence of free air beneath the hemidiaphragms.  There are postop changes in the right upper abdominal quadrant.  There are degenerative changes in the osseous structures.                                       Medications   cefTRIAXone injection 1 g (1 g Intramuscular Given 4/7/25 1812)   dexAMETHasone injection 4 mg (4 mg Intramuscular Given 4/7/25 1812)   albuterol-ipratropium 2.5 mg-0.5 mg/3 mL nebulizer solution 3 mL (3 mLs Nebulization Given 4/7/25 1818)     Medical Decision Making  Presents for evaluation of productive cough and runny nose over the past 5 days that has not improving despite over-the-counter measures.  She is alert and oriented x4.  GCS 15.  Vital signs stable.  Currently afebrile with a temperature 97.8°.  She is tolerating p.o. without any vomiting.  Denies chest pain, shortness of breath, difficulty breathing, or any other complaints outside of the myalgia during coughing episodes.  Speaking in full sentences able to provide an adequate history.  She is nontoxic in appearance.  Suspect URI.  We will send swabs for flu and COVID as well as perform a chest x-ray given her advanced age despite her breath sounds being clear and no lower respiratory complaints.    Swabs negative for flu and COVID.  Still suspect URI since she has no lower respiratory complaints.  Given her age, comorbidities, and that her symptoms have been present for 5 days  without any significant improvement we did recommend antibiotic coverage and she is agreeable.  Previously tolerated Amoxil 4 months prior for similar complaints and reported improvement.  We will provide Rocephin 1 g IM here in the ED and follow up with an outpatient course of Augmentin.  She has a diabetic but typically well controlled.  Last A1c in the sevens.  We will provide 1 dose of Decadron 4 mg IM here in the ED for symptomatic relief.  Rx for Tessalon Perles PRN cough.  Over-the-counter Zyrtec and Flonase.  She does have an albuterol inhaler at home but is requesting a breathing treatment here in the ED to attempt to clear mucus so we will provide DuoNeb x1 and have instructed her to keep her inhaler with her at all times and use as directed.  Increase fluids to maintain proper hydration and thin mucus.  Follow up with the PCP Wednesday as scheduled.  She was informed that today's visit is only a snap shot in time and things could certainly change or worsen which should prompt her to return to the ED for repeat evaluation.  Strict ED return precautions explained in detail.  All questions answered.  She verbalizes understanding and is in agreement with this plan.    Amount and/or Complexity of Data Reviewed  Independent Historian:      Details: 70-year-old female presents to the ED for evaluation of cough productive of yellow sputum as well as runny nose over the past 5 days.  She reports that she has an appointment scheduled with the PCP for this coming Wednesday but that her symptoms have not improved and presented to the ED for evaluation.  Prostate troponins only other complaint is back pain present during coughing episodes.  She denies chest pain, shortness of breath, difficulty breathing, abdominal pain, vomiting, or any other complaints at this time.  Blood pressure 113/78, temperature 97.8°, heart rate 90, respirations 18, and oxygen saturation 95% on room air.  Speaking in full sentences able to  provide an adequate history.  She appears in no immediate distress  Labs: ordered.     Details: Swabs negative for flu and COVID.  Radiology: ordered.     Details: Chest x-ray shows no acute intrathoracic process.    Risk  Prescription drug management.  Risk Details: Patient presents for emergent evaluation of acute cough and runny nose that poses a threat to life and/or bodily function.    Final diagnoses:  [R05.9] Cough  [J06.9] Upper respiratory tract infection, unspecified type (Primary)  [M79.10] Myalgia  [H93.8X1] Ear pressure, right  [R09.81] Nasal congestion  I ordered labs and personally reviewed them.    I ordered X-rays and personally reviewed them and reviewed the radiologist interpretation.     Suspected diagnosis, home care, follow up, over-the-counter recommendations, Rx medication information, and very strict ED return precautions explained in detail.  All questions answered.  Patient and family verbalized understanding and are in agreement with this plan.  Patient was managed in the ED with IM Decadron and Rocephin as well as DuoNeb INH.    The response to treatment was improved.    Patient was discharged in stable condition.  Detailed return precautions discussed.                                       Clinical Impression:   Final diagnoses:  [R05.9] Cough  [J06.9] Upper respiratory tract infection, unspecified type (Primary)  [M79.10] Myalgia  [H93.8X1] Ear pressure, right  [R09.81] Nasal congestion        ED Disposition Condition    Discharge Stable          ED Prescriptions       Medication Sig Dispense Start Date End Date Auth. Provider    amoxicillin-clavulanate 875-125mg (AUGMENTIN) 875-125 mg per tablet Take 1 tablet by mouth 2 (two) times daily. for 10 days 20 tablet 4/7/2025 4/17/2025 Martín Escobar FNP    benzonatate (TESSALON) 100 MG capsule Take 1 capsule (100 mg total) by mouth 3 (three) times daily as needed for Cough. 20 capsule 4/7/2025 4/17/2025 Martín Escobar FNP           Follow-up Information       Follow up With Specialties Details Why Contact Info    Robert Gallegos MD Family Medicine Schedule an appointment as soon as possible for a visit in 2 days  [address] [facility]  [facility] [place] [address]  [phone]                 [1]   Social History  Tobacco Use    Smoking status: Never     Passive exposure: Never    Smokeless tobacco: Never   Substance Use Topics    Alcohol use: Never    Drug use: Never        Martín Escobar, P  04/07/25 1910

## 2025-04-07 NOTE — DISCHARGE INSTRUCTIONS
Take antibiotics and cough medication as prescribed.  Over-the-counter Zyrtec and Flonase as discussed.  Increase water to maintain proper hydration and thin mucus.  Keep scheduled appointment with the primary care provider on Wednesday as planned.  Avoid common allergic triggers.  Keep albuterol inhaler with you at all times and use as directed.  Return to the ED for chest pain, shortness of breath, difficulty breathing, fever, vomiting, or any other new or worrisome symptoms.

## 2025-04-09 ENCOUNTER — OFFICE VISIT (OUTPATIENT)
Dept: FAMILY MEDICINE | Facility: CLINIC | Age: 70
End: 2025-04-09
Payer: MEDICARE

## 2025-04-09 VITALS
DIASTOLIC BLOOD PRESSURE: 72 MMHG | OXYGEN SATURATION: 96 % | HEART RATE: 78 BPM | HEIGHT: 62 IN | WEIGHT: 181.63 LBS | SYSTOLIC BLOOD PRESSURE: 118 MMHG | BODY MASS INDEX: 33.43 KG/M2

## 2025-04-09 DIAGNOSIS — I10 ESSENTIAL HYPERTENSION: ICD-10-CM

## 2025-04-09 DIAGNOSIS — J06.9 UPPER RESPIRATORY TRACT INFECTION, UNSPECIFIED TYPE: Primary | ICD-10-CM

## 2025-04-09 PROCEDURE — 99213 OFFICE O/P EST LOW 20 MIN: CPT | Mod: ,,, | Performed by: FAMILY MEDICINE

## 2025-04-09 NOTE — PROGRESS NOTES
Robert Gallegos MD   [facility]  [facility] - FAMILY MEDIC[facility]  [address] [place] [id number]  [phone]      PATIENT NAME: Rosalva Lopez  : 1955  DATE: 25  MRN: [id number]      Billing Provider: Robert Gallegos MD  Level of Service: IN OFFICE/OUTPT VISIT, EST, LEVL III, 20-29 MIN  Patient PCP Information       Provider PCP Type    Robert Gallegos MD General            Reason for Visit / Chief Complaint: ER follow up (ER 25- URI on antibiotics)       Update PCP  Update Chief Complaint         History of Present Illness / Problem Focused Workflow     Rosalva Lopez presents to the clinic with ER follow up (ER 25- URI on antibiotics)       I have reviewed her ED encounter and associated labs.  She was given IM rocephin and steroid as well as breathing treatment.  Is taking rx PO augmentin and also tessalon perles.  Is using zyrtec and nasal decongestant spray.        Review of Systems     Review of Systems   HENT:  Positive for nasal congestion and sinus pressure/congestion.    Respiratory:  Positive for cough.         Medical / Social / Family History     Past Medical History:   Diagnosis Date    Depressive disorder     Diabetes     GERD (gastroesophageal reflux disease)     Hypothyroidism     Stage 3b chronic kidney disease 2023    Trigeminal neuralgia of left side of face        Past Surgical History:   Procedure Laterality Date    BREAST BIOPSY      CARPAL TUNNEL RELEASE Left     CHOLECYSTECTOMY      HYSTERECTOMY      INGUINAL HERNIA REPAIR      OOPHORECTOMY      TONSILLECTOMY      TYMPANOPLASTY         Social History    reports that she has never smoked. She has never been exposed to tobacco smoke. She has never used smokeless tobacco. She reports that she does not drink alcohol and does not use drugs.   Social History[1]    Family History  Family History   Problem Relation Name Age of Onset    Heart disease Mother       Hypertension Father      Breast cancer Maternal Grandmother      Diabetes Brother         Medications and Allergies     Medications  Outpatient Medications Marked as Taking for the 4/9/25 encounter (Office Visit) with Robert Gallegos MD   Medication Sig Dispense Refill    acetaminophen (TYLENOL) 325 MG tablet Take 2 tablets (650 mg total) by mouth every 8 (eight) hours as needed.  0    albuterol (VENTOLIN HFA) 90 mcg/actuation inhaler Inhale 2 puffs into the lungs every 6 (six) hours as needed for Wheezing or Shortness of Breath. Rescue 8 g 1    amLODIPine (NORVASC) 10 MG tablet Take 1 tablet by mouth once daily 90 tablet 0    amoxicillin-clavulanate 875-125mg (AUGMENTIN) 875-125 mg per tablet Take 1 tablet by mouth 2 (two) times daily. for 10 days 20 tablet 0    aspirin (ECOTRIN) 81 MG EC tablet Take 81 mg by mouth once daily.      benzonatate (TESSALON) 100 MG capsule Take 1 capsule (100 mg total) by mouth 3 (three) times daily as needed for Cough. 20 capsule 0    diclofenac sodium (VOLTAREN) 1 % Gel Apply 2 g topically 4 (four) times daily. 100 g 0    ergocalciferol (ERGOCALCIFEROL) 50,000 unit Cap Take 5,000 Units by mouth once daily.      estradioL (ESTRACE) 1 MG tablet Take 1 tablet by mouth once daily 90 tablet 0    famotidine (PEPCID) 40 MG tablet Take 1 tablet (40 mg total) by mouth Daily. 90 tablet 1    furosemide (LASIX) 20 MG tablet Take 1 tablet (20 mg total) by mouth daily as needed (for edema (swelling)). 30 tablet 1    gabapentin (NEURONTIN) 100 MG capsule Take two capsules by mouth twice daily. 360 capsule 1    glipiZIDE (GLUCOTROL) 5 MG tablet Take 1 tablet (5 mg total) by mouth 2 (two) times daily with meals. 180 tablet 2    latanoprost 0.005 % ophthalmic solution Place 1 drop into both eyes every evening.      levocetirizine (XYZAL) 5 MG tablet Take 1 tablet (5 mg total) by mouth every evening. 7 tablet 0    levothyroxine (SYNTHROID) 50 MCG tablet Take 1 tablet (50 mcg total) by mouth before  breakfast. 90 tablet 1    ondansetron (ZOFRAN-ODT) 4 MG TbDL Take 1 tablet (4 mg total) by mouth every 8 (eight) hours as needed (n/v). 15 tablet 0    pantoprazole (PROTONIX) 40 MG tablet Take 1 tablet by mouth once daily 90 tablet 0    pravastatin (PRAVACHOL) 40 MG tablet TAKE 1 TABLET BY MOUTH ONCE DAILY IN THE EVENING 90 tablet 3    traZODone (DESYREL) 50 MG tablet Take 1 tablet by mouth once daily 90 tablet 0    valproic acid (DEPAKENE) 250 mg capsule Take 1 capsule by mouth twice daily 180 capsule 0       Allergies  Review of patient's allergies indicates:   Allergen Reactions    Flu vaccine ts 2011-12(18 yr+)     Fluzone [id number] tri-whole     Sulfa (sulfonamide antibiotics)     Tamiflu [oseltamivir]        Physical Examination     Vitals:    04/09/25 1411   BP: 118/72   Pulse: 78     Physical Exam  Vitals reviewed.   Constitutional:       General: She is not in acute distress.     Appearance: Normal appearance. She is ill-appearing. She is not toxic-appearing.   HENT:      Head: Normocephalic and atraumatic.      Nose: Congestion present.   Eyes:      Extraocular Movements: Extraocular movements intact.      Conjunctiva/sclera: Conjunctivae normal.      Pupils: Pupils are equal, round, and reactive to light.   Cardiovascular:      Rate and Rhythm: Normal rate and regular rhythm.   Pulmonary:      Effort: Pulmonary effort is normal. No respiratory distress.      Breath sounds: Normal breath sounds. No wheezing, rhonchi or rales.   Musculoskeletal:         General: Normal range of motion.      Cervical back: Normal range of motion.   Skin:     General: Skin is warm and dry.   Neurological:      General: No focal deficit present.      Mental Status: She is alert and oriented to person, place, and time.   Psychiatric:         Mood and Affect: Mood normal.         Behavior: Behavior normal.        Admission on 04/07/2025, Discharged on 04/07/2025   Component Date Value Ref Range Status    INFLUENZA A MOLECULAR  04/07/2025 Negative  Negative Final    INFLUENZA B MOLECULAR  04/07/2025 Negative  Negative Final    SARS COV-2 Molecular 04/07/2025 Negative  Negative, Invalid Final     X-Ray Chest PA And Lateral  Narrative: EXAMINATION:  XR CHEST PA AND LATERAL    CLINICAL HISTORY:  Cough, unspecified    TECHNIQUE:  PA and lateral views of the chest were performed.    COMPARISON:  07/05/2023.    FINDINGS:  The trachea is unremarkable.  There are calcifications of the aortic knob.  The cardiomediastinal silhouette is within normal limits.  There are no pleural effusions.  There is no evidence of a pneumothorax.  There is no evidence of pneumomediastinum.  No airspace opacity is present.    There is no evidence of free air beneath the hemidiaphragms.  There are postop changes in the right upper abdominal quadrant.  There are degenerative changes in the osseous structures.  Impression: No acute intrathoracic process.    Electronically signed by: Osmel Pearce MD  Date:    04/07/2025  Time:    19:04      Assessment and Plan (including Health Maintenance)      Problem List  Smart Sets  Document Outside HM   :    Plan: continue current care and complete course of antibiotic.  Instructed to not use nasal decongestant spray for more than 3 consecutive days.        Health Maintenance Due   Topic Date Due    TETANUS VACCINE  Never done    Pneumococcal Vaccines (Age 50+) (1 of 2 - PCV) Never done    Shingles Vaccine (1 of 2) Never done    RSV Vaccine (Age 60+ and Pregnant patients) (1 - Risk 60-74 years 1-dose series) Never done    COVID-19 Vaccine (3 - 2024-25 season) 09/01/2024    Diabetic Eye Exam  12/06/2024    Colorectal Cancer Screening  02/03/2025       Problem List Items Addressed This Visit          Cardiac/Vascular    Essential hypertension     Other Visit Diagnoses         Upper respiratory tract infection, unspecified type    -  Primary            Health Maintenance Topics with due status: Not Due       Topic Last Completion Date     Lipid Panel 11/22/2024    Diabetes Urine Screening 12/05/2024    Mammogram 12/23/2024    DEXA Scan 12/23/2024    Hemoglobin A1c 03/11/2025    Foot Exam 04/02/2025       Future Appointments   Date Time Provider Department Center   6/30/2025  9:00 AM [facility] GI ROOM 03 [facility]   10/30/2025  2:00 PM [facility]SE, [place] [facility]   12/30/2025  2:00 PM [facility]            Signature:  MD ABELARDO Murcia NEAL [facility]  [facility] [address] [address]  [phone]    Date of encounter: 4/9/25         [1]   Social History  Tobacco Use    Smoking status: Never     Passive exposure: Never    Smokeless tobacco: Never   Substance Use Topics    Alcohol use: Never    Drug use: Never

## 2025-04-09 NOTE — PATIENT INSTRUCTIONS
Complete your course of antibiotic (amoxicillin-clavulanate).  Do not use the nasal decongestant spray for more than 3 days in a row.  Follow up in clinic as needed.

## 2025-04-29 DIAGNOSIS — F33.41 RECURRENT MAJOR DEPRESSIVE DISORDER, IN PARTIAL REMISSION: Chronic | ICD-10-CM

## 2025-04-29 DIAGNOSIS — E89.40 ASYMPTOMATIC POSTSURGICAL MENOPAUSE: Chronic | ICD-10-CM

## 2025-04-29 DIAGNOSIS — K21.9 GASTROESOPHAGEAL REFLUX DISEASE WITHOUT ESOPHAGITIS: Chronic | ICD-10-CM

## 2025-04-29 RX ORDER — TRAZODONE HYDROCHLORIDE 50 MG/1
50 TABLET ORAL
Qty: 90 TABLET | Refills: 0 | Status: SHIPPED | OUTPATIENT
Start: 2025-04-29

## 2025-04-29 RX ORDER — PANTOPRAZOLE SODIUM 40 MG/1
40 TABLET, DELAYED RELEASE ORAL
Qty: 90 TABLET | Refills: 0 | Status: SHIPPED | OUTPATIENT
Start: 2025-04-29

## 2025-04-29 RX ORDER — ESTRADIOL 1 MG/1
1 TABLET ORAL
Qty: 90 TABLET | Refills: 0 | Status: SHIPPED | OUTPATIENT
Start: 2025-04-29

## 2025-04-30 ENCOUNTER — EXTERNAL CHRONIC CARE MANAGEMENT (OUTPATIENT)
Dept: FAMILY MEDICINE | Facility: CLINIC | Age: 70
End: 2025-04-30
Payer: MEDICARE

## 2025-04-30 PROCEDURE — 99490 CHRNC CARE MGMT STAFF 1ST 20: CPT | Mod: ,,, | Performed by: FAMILY MEDICINE

## 2025-05-01 ENCOUNTER — OFFICE VISIT (OUTPATIENT)
Dept: FAMILY MEDICINE | Facility: CLINIC | Age: 70
End: 2025-05-01
Payer: MEDICARE

## 2025-05-01 VITALS
BODY MASS INDEX: 33.18 KG/M2 | HEIGHT: 62 IN | SYSTOLIC BLOOD PRESSURE: 117 MMHG | OXYGEN SATURATION: 95 % | HEART RATE: 79 BPM | WEIGHT: 180.31 LBS | DIASTOLIC BLOOD PRESSURE: 72 MMHG

## 2025-05-01 DIAGNOSIS — E11.40 TYPE 2 DIABETES MELLITUS WITH DIABETIC NEUROPATHY, WITHOUT LONG-TERM CURRENT USE OF INSULIN: Chronic | ICD-10-CM

## 2025-05-01 DIAGNOSIS — E53.8 B12 DEFICIENCY: ICD-10-CM

## 2025-05-01 DIAGNOSIS — Z86.2 HISTORY OF ANEMIA: ICD-10-CM

## 2025-05-01 DIAGNOSIS — E61.1 IRON DEFICIENCY: Chronic | ICD-10-CM

## 2025-05-01 DIAGNOSIS — E55.9 VITAMIN D DEFICIENCY: Chronic | ICD-10-CM

## 2025-05-01 DIAGNOSIS — J06.9 UPPER RESPIRATORY TRACT INFECTION, UNSPECIFIED TYPE: Primary | ICD-10-CM

## 2025-05-01 PROCEDURE — 82746 ASSAY OF FOLIC ACID SERUM: CPT | Mod: ,,, | Performed by: CLINICAL MEDICAL LABORATORY

## 2025-05-01 PROCEDURE — 85025 COMPLETE CBC W/AUTO DIFF WBC: CPT | Mod: ,,, | Performed by: CLINICAL MEDICAL LABORATORY

## 2025-05-01 PROCEDURE — 83540 ASSAY OF IRON: CPT | Mod: ,,, | Performed by: CLINICAL MEDICAL LABORATORY

## 2025-05-01 PROCEDURE — 99214 OFFICE O/P EST MOD 30 MIN: CPT | Mod: ,,, | Performed by: FAMILY MEDICINE

## 2025-05-01 PROCEDURE — 83550 IRON BINDING TEST: CPT | Mod: ,,, | Performed by: CLINICAL MEDICAL LABORATORY

## 2025-05-01 PROCEDURE — 82306 VITAMIN D 25 HYDROXY: CPT | Mod: ,,, | Performed by: CLINICAL MEDICAL LABORATORY

## 2025-05-01 PROCEDURE — 82607 VITAMIN B-12: CPT | Mod: ,,, | Performed by: CLINICAL MEDICAL LABORATORY

## 2025-05-01 NOTE — PROGRESS NOTES
Robert Gallegos MD   [facility]  MEDICAL GROUP [facility]  [address] [place] [id number]  [phone]      PATIENT NAME: Rosalva Lopez  : 1955  DATE: 25  MRN: [id number]      Billing Provider: Robert Gallegos MD  Level of Service:   Patient PCP Information       Provider PCP Type    Robert Gallegos MD General            Reason for Visit / Chief Complaint: Fatigue, Cough, and Health Maintenance (TETANUS VACCINE Never done/Pneumococcal Vaccines (Age 50+)(1 of 2 - PCV) Never done/Shingles Vaccine(1 of 2) Never done/RSV Vaccine (Age 60+ and Pregnant patients)(1 - Risk 60-74 years 1-dose series) Never done/COVID-19 Vaccine(3 -  season) due on 2024/Diabetic Eye Exam due on 2024/Colorectal Cancer Screening due on 2025/Address again at well visit)       Update PCP  Update Chief Complaint         History of Present Illness / Problem Focused Workflow     Rosalva Lopez presents to the clinic with Fatigue, Cough, and Health Maintenance (TETANUS VACCINE Never done/Pneumococcal Vaccines (Age 50+)(1 of 2 - PCV) Never done/Shingles Vaccine(1 of 2) Never done/RSV Vaccine (Age 60+ and Pregnant patients)(1 - Risk 60-74 years 1-dose series) Never done/COVID-19 Vaccine(3 -  season) due on 2024/Diabetic Eye Exam due on 2024/Colorectal Cancer Screening due on 2025/Address again at well visit)       Is recovering from an URI.  Stills feels a bit fatigued.      Review of Systems     Review of Systems   HENT:  Positive for nasal congestion and sinus pressure/congestion.    Respiratory:  Positive for cough.         Medical / Social / Family History     Past Medical History:   Diagnosis Date    Depressive disorder     Diabetes     GERD (gastroesophageal reflux disease)     Hypothyroidism     Stage 3b chronic kidney disease 2023    Trigeminal neuralgia of left side of face        Past Surgical History:    Procedure Laterality Date    BREAST BIOPSY      CARPAL TUNNEL RELEASE Left     CHOLECYSTECTOMY      HYSTERECTOMY      INGUINAL HERNIA REPAIR      OOPHORECTOMY      TONSILLECTOMY      TYMPANOPLASTY         Social History    reports that she has never smoked. She has never been exposed to tobacco smoke. She has never used smokeless tobacco. She reports that she does not drink alcohol and does not use drugs.   Social History[1]    Family History  Family History   Problem Relation Name Age of Onset    Heart disease Mother      Hypertension Father      Breast cancer Maternal Grandmother      Diabetes Brother         Medications and Allergies     Medications  Outpatient Medications Marked as Taking for the 5/1/25 encounter (Office Visit) with Robert Gallegos MD   Medication Sig Dispense Refill    albuterol (VENTOLIN HFA) 90 mcg/actuation inhaler Inhale 2 puffs into the lungs every 6 (six) hours as needed for Wheezing or Shortness of Breath. Rescue 8 g 1    amLODIPine (NORVASC) 10 MG tablet Take 1 tablet by mouth once daily 90 tablet 0    aspirin (ECOTRIN) 81 MG EC tablet Take 81 mg by mouth once daily.      estradioL (ESTRACE) 1 MG tablet Take 1 tablet by mouth once daily 90 tablet 0    furosemide (LASIX) 20 MG tablet Take 1 tablet (20 mg total) by mouth daily as needed (for edema (swelling)). 30 tablet 1    gabapentin (NEURONTIN) 100 MG capsule Take two capsules by mouth twice daily. 360 capsule 1    glipiZIDE (GLUCOTROL) 5 MG tablet Take 1 tablet (5 mg total) by mouth 2 (two) times daily with meals. 180 tablet 2    latanoprost 0.005 % ophthalmic solution Place 1 drop into both eyes every evening.      levocetirizine (XYZAL) 5 MG tablet Take 1 tablet (5 mg total) by mouth every evening. 7 tablet 0    levothyroxine (SYNTHROID) 50 MCG tablet Take 1 tablet (50 mcg total) by mouth before breakfast. 90 tablet 1    ondansetron (ZOFRAN-ODT) 4 MG TbDL Take 1 tablet (4 mg total) by mouth every 8 (eight) hours as needed  (n/v). 15 tablet 0    pantoprazole (PROTONIX) 40 MG tablet Take 1 tablet by mouth once daily 90 tablet 0    pravastatin (PRAVACHOL) 40 MG tablet TAKE 1 TABLET BY MOUTH ONCE DAILY IN THE EVENING 90 tablet 3    traZODone (DESYREL) 50 MG tablet Take 1 tablet by mouth once daily 90 tablet 0    valproic acid (DEPAKENE) 250 mg capsule Take 1 capsule by mouth twice daily 180 capsule 0       Allergies  Review of patient's allergies indicates:   Allergen Reactions    Flu vaccine ts 2011-12(18 yr+)     Fluzone [id number] tri-whole     Sulfa (sulfonamide antibiotics)     Tamiflu [oseltamivir]        Physical Examination     Vitals:    05/01/25 1356   BP: 117/72   Pulse: 79     Physical Exam  Vitals reviewed.   Constitutional:       General: She is not in acute distress.     Appearance: Normal appearance. She is not ill-appearing or toxic-appearing.   HENT:      Head: Normocephalic and atraumatic.      Nose: Congestion present.   Eyes:      Extraocular Movements: Extraocular movements intact.      Conjunctiva/sclera: Conjunctivae normal.      Pupils: Pupils are equal, round, and reactive to light.   Cardiovascular:      Rate and Rhythm: Normal rate and regular rhythm.   Pulmonary:      Effort: Pulmonary effort is normal. No respiratory distress.      Breath sounds: Normal breath sounds. No wheezing, rhonchi or rales.   Musculoskeletal:         General: Normal range of motion.      Cervical back: Normal range of motion.   Skin:     General: Skin is warm and dry.   Neurological:      General: No focal deficit present.      Mental Status: She is alert and oriented to person, place, and time.   Psychiatric:         Mood and Affect: Mood normal.         Behavior: Behavior normal.          Assessment and Plan (including Health Maintenance)      Problem List  Smart Sets  Document Outside HM   :    Plan:         Health Maintenance Due   Topic Date Due    TETANUS VACCINE  Never done    Pneumococcal Vaccines (Age 50+) (1 of 2 - PCV) Never  done    Shingles Vaccine (1 of 2) Never done    RSV Vaccine (Age 60+ and Pregnant patients) (1 - Risk 60-74 years 1-dose series) Never done    COVID-19 Vaccine (3 - 2024-25 season) 09/01/2024    Diabetic Eye Exam  12/06/2024    Colorectal Cancer Screening  02/03/2025       Problem List Items Addressed This Visit          Endocrine    Type 2 diabetes mellitus with diabetic neuropathy, without long-term current use of insulin (Chronic)     Other Visit Diagnoses         Upper respiratory tract infection, unspecified type    -  Primary      History of anemia        Relevant Orders    Vitamin B12 & Folate (Completed)    Iron and TIBC (Completed)    CBC Auto Differential (Completed)      Vitamin D deficiency  (Chronic)       Relevant Orders    Vitamin D (Completed)      B12 deficiency        Relevant Orders    Vitamin B12 & Folate (Completed)            Health Maintenance Topics with due status: Not Due       Topic Last Completion Date    Lipid Panel 11/22/2024    Diabetes Urine Screening 12/05/2024    Mammogram 12/23/2024    DEXA Scan 12/23/2024    Hemoglobin A1c 03/11/2025    Foot Exam 04/02/2025       Future Appointments   Date Time Provider Department Center   6/30/2025  9:00 AM [facility] GI ROOM 03 [facility] ENDO [place] [facility]   10/30/2025  2:00 PM [facility] NURSE, [place] [facility]   12/30/2025  2:00 PM [facility]            Signature:  MD ABELARDO Murcia [facility]  MEDICAL GROUP [facility]  [address] [place] [address]  [phone]    Date of encounter: 5/1/25         [1]   Social History  Tobacco Use    Smoking status: Never     Passive exposure: Never    Smokeless tobacco: Never   Substance Use Topics    Alcohol use: Never    Drug use: Never

## 2025-05-02 LAB
25(OH)D3 SERPL-MCNC: 73.9 NG/ML (ref 30–80)
BASOPHILS # BLD AUTO: 0.18 K/UL (ref 0–0.2)
BASOPHILS NFR BLD AUTO: 1.6 % (ref 0–1)
DIFFERENTIAL METHOD BLD: ABNORMAL
EOSINOPHIL # BLD AUTO: 0.57 K/UL (ref 0–0.5)
EOSINOPHIL NFR BLD AUTO: 5.2 % (ref 1–4)
ERYTHROCYTE [DISTWIDTH] IN BLOOD BY AUTOMATED COUNT: 15.8 % (ref 11.5–14.5)
FOLATE SERPL-MCNC: 9.5 NG/ML (ref 7–31.4)
HCT VFR BLD AUTO: 46.1 % (ref 38–47)
HGB BLD-MCNC: 14.8 G/DL (ref 12–16)
IMM GRANULOCYTES # BLD AUTO: 0.09 K/UL (ref 0–0.04)
IMM GRANULOCYTES NFR BLD: 0.8 % (ref 0–0.4)
IRON SATN MFR SERPL: 15 % (ref 20–50)
IRON SERPL-MCNC: 46 UG/DL (ref 50–170)
LYMPHOCYTES # BLD AUTO: 2.52 K/UL (ref 1–4.8)
LYMPHOCYTES NFR BLD AUTO: 22.8 % (ref 27–41)
MCH RBC QN AUTO: 28.7 PG (ref 27–31)
MCHC RBC AUTO-ENTMCNC: 32.1 G/DL (ref 32–36)
MCV RBC AUTO: 89.5 FL (ref 80–96)
MONOCYTES # BLD AUTO: 1.22 K/UL (ref 0–0.8)
MONOCYTES NFR BLD AUTO: 11 % (ref 2–6)
MPC BLD CALC-MCNC: 12.3 FL (ref 9.4–12.4)
NEUTROPHILS # BLD AUTO: 6.47 K/UL (ref 1.8–7.7)
NEUTROPHILS NFR BLD AUTO: 58.6 % (ref 53–65)
NRBC # BLD AUTO: 0 X10E3/UL
NRBC, AUTO (.00): 0 %
PLATELET # BLD AUTO: 214 K/UL (ref 150–400)
RBC # BLD AUTO: 5.15 M/UL (ref 4.2–5.4)
TIBC SERPL-MCNC: 271 UG/DL (ref 70–310)
TIBC SERPL-MCNC: 317 UG/DL (ref 250–450)
TRANSFERRIN SERPL-MCNC: 306 MG/DL (ref 173–360)
VIT B12 SERPL-MCNC: 410 PG/ML (ref 213–816)
WBC # BLD AUTO: 11.05 K/UL (ref 4.5–11)

## 2025-05-30 ENCOUNTER — TELEPHONE (OUTPATIENT)
Dept: FAMILY MEDICINE | Facility: CLINIC | Age: 70
End: 2025-05-30
Payer: MEDICARE

## 2025-05-30 NOTE — TELEPHONE ENCOUNTER
----- Message from Maxwell sent at 5/29/2025  3:50 PM CDT -----  Refill for Amlodipine [facility]

## 2025-05-31 ENCOUNTER — EXTERNAL CHRONIC CARE MANAGEMENT (OUTPATIENT)
Dept: FAMILY MEDICINE | Facility: CLINIC | Age: 70
End: 2025-05-31
Payer: MEDICARE

## 2025-05-31 PROCEDURE — 99490 CHRNC CARE MGMT STAFF 1ST 20: CPT | Mod: ,,, | Performed by: FAMILY MEDICINE

## 2025-05-31 PROCEDURE — 99439 CHRNC CARE MGMT STAF EA ADDL: CPT | Mod: ,,, | Performed by: FAMILY MEDICINE

## 2025-06-30 ENCOUNTER — EXTERNAL CHRONIC CARE MANAGEMENT (OUTPATIENT)
Dept: FAMILY MEDICINE | Facility: CLINIC | Age: 70
End: 2025-06-30
Payer: MEDICARE

## 2025-06-30 PROCEDURE — 99490 CHRNC CARE MGMT STAFF 1ST 20: CPT | Mod: ,,, | Performed by: FAMILY MEDICINE

## 2025-06-30 PROCEDURE — 99439 CHRNC CARE MGMT STAF EA ADDL: CPT | Mod: ,,, | Performed by: FAMILY MEDICINE

## 2025-07-25 ENCOUNTER — OFFICE VISIT (OUTPATIENT)
Dept: FAMILY MEDICINE | Facility: CLINIC | Age: 70
End: 2025-07-25
Payer: MEDICARE

## 2025-07-25 VITALS
BODY MASS INDEX: 33.7 KG/M2 | HEART RATE: 89 BPM | WEIGHT: 183.13 LBS | OXYGEN SATURATION: 94 % | SYSTOLIC BLOOD PRESSURE: 126 MMHG | DIASTOLIC BLOOD PRESSURE: 75 MMHG | HEIGHT: 62 IN

## 2025-07-25 DIAGNOSIS — E11.40 TYPE 2 DIABETES MELLITUS WITH DIABETIC NEUROPATHY, WITHOUT LONG-TERM CURRENT USE OF INSULIN: Chronic | ICD-10-CM

## 2025-07-25 DIAGNOSIS — J01.10 ACUTE FRONTAL SINUSITIS, RECURRENCE NOT SPECIFIED: Primary | ICD-10-CM

## 2025-07-25 DIAGNOSIS — I10 ESSENTIAL HYPERTENSION: Chronic | ICD-10-CM

## 2025-07-25 DIAGNOSIS — H26.9 CATARACT OF BOTH EYES, UNSPECIFIED CATARACT TYPE: Chronic | ICD-10-CM

## 2025-07-25 DIAGNOSIS — Z20.828 VIRAL DISEASE EXPOSURE: ICD-10-CM

## 2025-07-25 PROCEDURE — 99214 OFFICE O/P EST MOD 30 MIN: CPT | Mod: ,,, | Performed by: FAMILY MEDICINE

## 2025-07-25 PROCEDURE — 96372 THER/PROPH/DIAG INJ SC/IM: CPT | Mod: ,,, | Performed by: FAMILY MEDICINE

## 2025-07-25 PROCEDURE — 87502 INFLUENZA DNA AMP PROBE: CPT | Mod: RHCUB | Performed by: FAMILY MEDICINE

## 2025-07-25 PROCEDURE — 87635 SARS-COV-2 COVID-19 AMP PRB: CPT | Mod: RHCUB | Performed by: FAMILY MEDICINE

## 2025-07-25 RX ORDER — LINCOMYCIN HYDROCHLORIDE 300 MG/ML
600 INJECTION, SOLUTION INTRAMUSCULAR; INTRAVENOUS; SUBCONJUNCTIVAL
Status: COMPLETED | OUTPATIENT
Start: 2025-07-25 | End: 2025-07-25

## 2025-07-25 RX ADMIN — LINCOMYCIN HYDROCHLORIDE 600 MG: 300 INJECTION, SOLUTION INTRAMUSCULAR; INTRAVENOUS; SUBCONJUNCTIVAL at 03:07

## 2025-07-25 NOTE — PROGRESS NOTES
Robert Gallegos MD   [facility]  MEDICAL GROUP [facility]  [address] [place] [address]  [phone]      PATIENT NAME: Rosalva Lopez  : 1955  DATE: 25  MRN: [id number]      Billing Provider: Robert Gallegos MD  Level of Service:   Patient PCP Information       Provider PCP Type    Robert Gallegos MD General            Reason for Visit / Chief Complaint: Fatigue and Sore Throat       Update PCP  Update Chief Complaint         History of Present Illness / Problem Focused Workflow     Rosalva Lopez presents to the clinic with Fatigue and Sore Throat       Recently had cataract surgery both eyes  BP is doing well.  Recent A1C was 7.9      Review of Systems     Review of Systems   Constitutional:  Positive for fatigue. Negative for chills and fever.   HENT:  Positive for nasal congestion, ear pain and sore throat.    Gastrointestinal:  Positive for nausea. Negative for vomiting.        Medical / Social / Family History     Past Medical History:   Diagnosis Date    Depressive disorder     Diabetes     GERD (gastroesophageal reflux disease)     Hypothyroidism     Stage 3b chronic kidney disease 2023    Trigeminal neuralgia of left side of face        Past Surgical History:   Procedure Laterality Date    BREAST BIOPSY      CARPAL TUNNEL RELEASE Left     CHOLECYSTECTOMY      HYSTERECTOMY      INGUINAL HERNIA REPAIR      OOPHORECTOMY      TONSILLECTOMY      TYMPANOPLASTY         Social History    reports that she has never smoked. She has never been exposed to tobacco smoke. She has never used smokeless tobacco. She reports that she does not drink alcohol and does not use drugs.   Social History[1]    Family History  Family History   Problem Relation Name Age of Onset    Heart disease Mother      Hypertension Father      Breast cancer Maternal Grandmother      Diabetes Brother         Medications and Allergies     Medications  Outpatient  Medications Marked as Taking for the 7/25/25 encounter (Office Visit) with Robert Gallegos MD   Medication Sig Dispense Refill    albuterol (VENTOLIN HFA) 90 mcg/actuation inhaler Inhale 2 puffs into the lungs every 6 (six) hours as needed for Wheezing or Shortness of Breath. Rescue 8 g 1    amLODIPine (NORVASC) 10 MG tablet Take 1 tablet by mouth once daily 90 tablet 0    aspirin (ECOTRIN) 81 MG EC tablet Take 81 mg by mouth once daily.      furosemide (LASIX) 20 MG tablet Take 1 tablet (20 mg total) by mouth daily as needed (for edema (swelling)). 30 tablet 1    gabapentin (NEURONTIN) 100 MG capsule Take two capsules by mouth twice daily. 360 capsule 1    glipiZIDE (GLUCOTROL) 5 MG tablet Take 1 tablet (5 mg total) by mouth 2 (two) times daily with meals. 180 tablet 2    latanoprost 0.005 % ophthalmic solution Place 1 drop into both eyes every evening.      levocetirizine (XYZAL) 5 MG tablet Take 1 tablet (5 mg total) by mouth every evening. 7 tablet 0    levothyroxine (SYNTHROID) 50 MCG tablet Take 1 tablet (50 mcg total) by mouth before breakfast. 90 tablet 1    ondansetron (ZOFRAN-ODT) 4 MG TbDL Take 1 tablet (4 mg total) by mouth every 8 (eight) hours as needed (n/v). 15 tablet 0    pravastatin (PRAVACHOL) 40 MG tablet TAKE 1 TABLET BY MOUTH ONCE DAILY IN THE EVENING 90 tablet 3    valproic acid (DEPAKENE) 250 mg capsule Take 1 capsule by mouth twice daily 180 capsule 0    [DISCONTINUED] estradioL (ESTRACE) 1 MG tablet Take 1 tablet by mouth once daily 90 tablet 0    [DISCONTINUED] pantoprazole (PROTONIX) 40 MG tablet Take 1 tablet by mouth once daily 90 tablet 0    [DISCONTINUED] traZODone (DESYREL) 50 MG tablet Take 1 tablet by mouth once daily 90 tablet 0       Allergies  Review of patient's allergies indicates:   Allergen Reactions    Flu vaccine ts 2011-12(18 yr+)     Fluzone [id number] tri-whole     Sulfa (sulfonamide antibiotics)     Tamiflu [oseltamivir]        Physical Examination     Vitals:     07/25/25 1436   BP: 126/75   Pulse: 89     Physical Exam  Vitals reviewed.   Constitutional:       Appearance: Normal appearance.   HENT:      Head: Normocephalic and atraumatic.      Right Ear: Tympanic membrane normal.      Left Ear: Tympanic membrane normal.   Eyes:      Extraocular Movements: Extraocular movements intact.      Conjunctiva/sclera: Conjunctivae normal.      Pupils: Pupils are equal, round, and reactive to light.   Cardiovascular:      Rate and Rhythm: Normal rate and regular rhythm.      Heart sounds: Normal heart sounds.   Pulmonary:      Effort: Pulmonary effort is normal. No respiratory distress.      Breath sounds: Normal breath sounds. No wheezing, rhonchi or rales.   Musculoskeletal:         General: Normal range of motion.      Cervical back: Normal range of motion.   Skin:     General: Skin is warm and dry.   Neurological:      General: No focal deficit present.      Mental Status: She is alert and oriented to person, place, and time.   Psychiatric:         Mood and Affect: Mood normal.         Behavior: Behavior normal.          Assessment and Plan (including Health Maintenance)      Problem List  Smart Sets  Document Outside HM   :    Plan:         Health Maintenance Due   Topic Date Due    TETANUS VACCINE  Never done    Pneumococcal Vaccines (Age 50+) (1 of 2 - PCV) Never done    Shingles Vaccine (1 of 2) Never done    RSV Vaccine (Age 60+ and Pregnant patients) (1 - Risk 60-74 years 1-dose series) Never done    COVID-19 Vaccine (3 - 2024-25 season) 09/01/2024    Colorectal Cancer Screening  02/03/2025       Problem List Items Addressed This Visit          Cardiac/Vascular    Essential hypertension- normotensive, continue current care       Endocrine    Type 2 diabetes mellitus with diabetic neuropathy, without long-term current use of insulin (Chronic)- recheck A1C at 6 month interval in September     Other Visit Diagnoses         Acute frontal sinusitis, recurrence not specified    -   Primary    Relevant Medications    lincomycin injection 600 mg (Completed)      Viral disease exposure        Relevant Orders    POCT COVID-19 Rapid Screening (Completed)    POCT Influenza A/B Molecular (Completed)      Cataract of both eyes, unspecified cataract type  (Chronic)               Health Maintenance Topics with due status: Not Due       Topic Last Completion Date    Influenza Vaccine 10/31/2024    Lipid Panel 11/22/2024    Diabetes Urine Screening 12/05/2024    Mammogram 12/23/2024    DEXA Scan 12/23/2024    Hemoglobin A1c 03/11/2025    Diabetic Eye Exam 03/31/2025    Foot Exam 04/02/2025       Future Appointments   Date Time Provider Department Center   10/14/2025  9:30 AM [facility] ROOM 03 [facility]   10/30/2025  2:00 PM [facility] NURSE, [place] [facility]   12/30/2025  2:00 PM [facility]            Signature:  Robert Gallegos MD  [facility]  MEDICAL GROUP [facility]  [address] [place] [address]  [phone]    Date of encounter: 7/25/25         [1]   Social History  Tobacco Use    Smoking status: Never     Passive exposure: Never    Smokeless tobacco: Never   Substance Use Topics    Alcohol use: Never    Drug use: Never

## 2025-07-26 DIAGNOSIS — E89.40 ASYMPTOMATIC POSTSURGICAL MENOPAUSE: Chronic | ICD-10-CM

## 2025-07-26 DIAGNOSIS — F33.41 RECURRENT MAJOR DEPRESSIVE DISORDER, IN PARTIAL REMISSION: Chronic | ICD-10-CM

## 2025-07-26 DIAGNOSIS — K21.9 GASTROESOPHAGEAL REFLUX DISEASE WITHOUT ESOPHAGITIS: Chronic | ICD-10-CM

## 2025-07-29 RX ORDER — PANTOPRAZOLE SODIUM 40 MG/1
40 TABLET, DELAYED RELEASE ORAL
Qty: 90 TABLET | Refills: 0 | Status: SHIPPED | OUTPATIENT
Start: 2025-07-29

## 2025-07-29 RX ORDER — ESTRADIOL 1 MG/1
1 TABLET ORAL
Qty: 90 TABLET | Refills: 0 | Status: SHIPPED | OUTPATIENT
Start: 2025-07-29

## 2025-07-29 RX ORDER — TRAZODONE HYDROCHLORIDE 50 MG/1
50 TABLET ORAL
Qty: 90 TABLET | Refills: 0 | Status: SHIPPED | OUTPATIENT
Start: 2025-07-29

## 2025-07-31 ENCOUNTER — EXTERNAL CHRONIC CARE MANAGEMENT (OUTPATIENT)
Dept: FAMILY MEDICINE | Facility: CLINIC | Age: 70
End: 2025-07-31
Payer: MEDICARE

## 2025-07-31 DIAGNOSIS — E11.40 TYPE 2 DIABETES MELLITUS WITH DIABETIC NEUROPATHY, WITHOUT LONG-TERM CURRENT USE OF INSULIN: Chronic | ICD-10-CM

## 2025-07-31 PROCEDURE — 99490 CHRNC CARE MGMT STAFF 1ST 20: CPT | Mod: ,,, | Performed by: FAMILY MEDICINE

## 2025-07-31 NOTE — TELEPHONE ENCOUNTER
Copied from CRM #[id number]. Topic: Medications - New Medication Request  >> Jul 31, 2025  9:32 AM Javid Donahue wrote:  Pt called to see if she could get an antibiotic called in. She's still sick and her throat hurts.     [facility] - [place], [place] - [address].  [address] [place] [address]  Phone: [phone] Fax: [phone]

## 2025-08-01 RX ORDER — GLIPIZIDE 5 MG/1
5 TABLET ORAL 2 TIMES DAILY WITH MEALS
Qty: 180 TABLET | Refills: 2 | Status: SHIPPED | OUTPATIENT
Start: 2025-08-01

## 2025-08-01 RX ORDER — AMOXICILLIN 875 MG/1
875 TABLET, COATED ORAL 2 TIMES DAILY
Qty: 14 TABLET | Refills: 0 | Status: SHIPPED | OUTPATIENT
Start: 2025-08-01

## 2025-08-25 ENCOUNTER — TELEPHONE (OUTPATIENT)
Dept: FAMILY MEDICINE | Facility: CLINIC | Age: 70
End: 2025-08-25
Payer: MEDICARE

## 2025-08-26 ENCOUNTER — OFFICE VISIT (OUTPATIENT)
Dept: FAMILY MEDICINE | Facility: CLINIC | Age: 70
End: 2025-08-26
Payer: MEDICARE

## 2025-08-26 VITALS
SYSTOLIC BLOOD PRESSURE: 124 MMHG | DIASTOLIC BLOOD PRESSURE: 77 MMHG | HEART RATE: 78 BPM | BODY MASS INDEX: 33.71 KG/M2 | WEIGHT: 183.19 LBS | OXYGEN SATURATION: 96 % | HEIGHT: 62 IN

## 2025-08-26 DIAGNOSIS — K52.9 GASTROENTERITIS: Primary | ICD-10-CM

## 2025-08-26 DIAGNOSIS — G57.93 NEUROPATHY OF BOTH FEET: Chronic | ICD-10-CM

## 2025-08-26 DIAGNOSIS — E11.40 TYPE 2 DIABETES MELLITUS WITH DIABETIC NEUROPATHY, WITHOUT LONG-TERM CURRENT USE OF INSULIN: Chronic | ICD-10-CM

## 2025-08-26 PROCEDURE — 99214 OFFICE O/P EST MOD 30 MIN: CPT | Mod: ,,, | Performed by: FAMILY MEDICINE

## 2025-08-26 RX ORDER — GABAPENTIN 100 MG/1
CAPSULE ORAL
Qty: 360 CAPSULE | Refills: 1 | Status: SHIPPED | OUTPATIENT
Start: 2025-08-26

## 2025-08-26 RX ORDER — CIPROFLOXACIN 500 MG/1
500 TABLET, FILM COATED ORAL 2 TIMES DAILY
Qty: 14 TABLET | Refills: 0 | Status: SHIPPED | OUTPATIENT
Start: 2025-08-26

## 2025-08-26 RX ORDER — METRONIDAZOLE 500 MG/1
500 TABLET ORAL EVERY 12 HOURS
Qty: 14 TABLET | Refills: 0 | Status: SHIPPED | OUTPATIENT
Start: 2025-08-26